# Patient Record
Sex: FEMALE | Race: WHITE | NOT HISPANIC OR LATINO | Employment: FULL TIME
[De-identification: names, ages, dates, MRNs, and addresses within clinical notes are randomized per-mention and may not be internally consistent; named-entity substitution may affect disease eponyms.]

---

## 2020-04-09 ENCOUNTER — RESULTS ONLY (OUTPATIENT)
Dept: LAB | Age: 25
End: 2020-04-09

## 2020-04-09 ENCOUNTER — OFFICE VISIT (OUTPATIENT)
Dept: URGENT CARE | Facility: URGENT CARE | Age: 25
End: 2020-04-09

## 2020-04-09 DIAGNOSIS — Z20.822 SUSPECTED COVID-19 VIRUS INFECTION: Primary | ICD-10-CM

## 2020-04-09 PROCEDURE — 99207 ZZC NO BILLABLE SERVICE THIS VISIT: CPT

## 2020-04-09 NOTE — PATIENT INSTRUCTIONS
[facility] Employee Health team will receive your Covid 19 test results in the next 1-4 days.  Once your results are received, Employee Health will call you with your test result and discuss your Return to Work timeline and criteria.

## 2020-04-10 LAB
SARS-COV-2 PCR COMMENT: NORMAL
SARS-COV-2 RNA SPEC QL NAA+PROBE: NEGATIVE
SARS-COV-2 RNA SPEC QL NAA+PROBE: NORMAL
SPECIMEN SOURCE: NORMAL
SPECIMEN SOURCE: NORMAL

## 2021-01-04 ENCOUNTER — HEALTH MAINTENANCE LETTER (OUTPATIENT)
Age: 26
End: 2021-01-04

## 2021-10-10 ENCOUNTER — HEALTH MAINTENANCE LETTER (OUTPATIENT)
Age: 26
End: 2021-10-10

## 2022-01-30 ENCOUNTER — HEALTH MAINTENANCE LETTER (OUTPATIENT)
Age: 27
End: 2022-01-30

## 2022-04-01 ENCOUNTER — TRANSFERRED RECORDS (OUTPATIENT)
Dept: HEALTH INFORMATION MANAGEMENT | Facility: CLINIC | Age: 27
End: 2022-04-01

## 2022-04-01 LAB — PAP SMEAR - HIM PATIENT REPORTED: NEGATIVE

## 2022-05-29 ENCOUNTER — HEALTH MAINTENANCE LETTER (OUTPATIENT)
Age: 27
End: 2022-05-29

## 2022-06-21 ENCOUNTER — OFFICE VISIT (OUTPATIENT)
Dept: FAMILY MEDICINE | Facility: CLINIC | Age: 27
End: 2022-06-21
Payer: COMMERCIAL

## 2022-06-21 VITALS
WEIGHT: 166.6 LBS | OXYGEN SATURATION: 99 % | DIASTOLIC BLOOD PRESSURE: 79 MMHG | SYSTOLIC BLOOD PRESSURE: 128 MMHG | HEART RATE: 72 BPM | BODY MASS INDEX: 28.44 KG/M2 | HEIGHT: 64 IN | TEMPERATURE: 97.6 F

## 2022-06-21 DIAGNOSIS — Z78.9 VEGETARIAN DIET: ICD-10-CM

## 2022-06-21 DIAGNOSIS — Z76.89 ENCOUNTER TO ESTABLISH CARE: ICD-10-CM

## 2022-06-21 DIAGNOSIS — Z97.5 NEXPLANON IN PLACE: Primary | ICD-10-CM

## 2022-06-21 DIAGNOSIS — K59.01 SLOW TRANSIT CONSTIPATION: ICD-10-CM

## 2022-06-21 PROCEDURE — 99204 OFFICE O/P NEW MOD 45 MIN: CPT | Performed by: PHYSICIAN ASSISTANT

## 2022-06-21 ASSESSMENT — PAIN SCALES - GENERAL: PAINLEVEL: NO PAIN (0)

## 2022-06-21 NOTE — PROGRESS NOTES
"  Assessment & Plan   Problem List Items Addressed This Visit    None     Visit Diagnoses     Nexplanon in place    -  Primary    Relevant Orders    IR Referral    Encounter to establish care        Slow transit constipation        Vegetarian diet               1. Nexplanon is due to be removed this summer.  Implant has migrated proximally.  It is not longer palpable.  Referral placed to interventional radiology for imaging-guided removal of the Nexplanon.    2. We discussed constipation and IBS - I advise she use Miralax daily, and adjust the dose as needed for constipation.  Continue eating plenty of fruits and veg, drinking lots of water, and try to increase exercise.    3. Vegetarian diet.  We discussed possible nutritional deficiencies.  This is less likely for her, since she eats daily and eggs. If she develops any numbness or tingling, or other neurologic symptoms - we can check MMA.     Patient Instructions   Ask your previous clinic for your immunization record.  Then we can our system updated.    Please check in with cardiology/EP regarding any necessary follow up on WPW findings    Journal of Hospital Medicine  Choosing Wisely : Things we do for no Reason  Things We Do for No Reason: Prescribing Docusate for Constipation in Hospitalized Adults  Lamin Perla MD,Isaiah Villa MD  First published: 01 February 2019 [email or web address]                 BMI:   Estimated body mass index is 28.6 kg/m  as calculated from the following:    Height as of this encounter: 1.626 m (5' 4\").    Weight as of this encounter: 75.6 kg (166 lb 9.6 oz).   Weight management plan: Discussed healthy diet and exercise guidelines        Return in about 1 year (around 6/21/2023).    Callie Fontenot PA-C  [facility] is a 26 year old, presenting for the following health issues:  Establish Care      History of Present Illness       Reason for visit:  " "Establish primary care    She eats 4 or more servings of fruits and vegetables daily.She consumes 0 sweetened beverage(s) daily.She exercises with enough effort to increase her heart rate 20 to 29 minutes per day.  She exercises with enough effort to increase her heart rate 4 days per week.   She is taking medications regularly.             Review of Systems         Objective    /79   Pulse 72   Temp 97.6  F (36.4  C) (Tympanic)   Ht 1.626 m (5' 4\")   Wt 75.6 kg (166 lb 9.6 oz)   SpO2 99%   BMI 28.60 kg/m    Body mass index is 28.6 kg/m .  Physical Exam  Constitutional:       General: She is not in acute distress.     Appearance: She is well-developed. She is not diaphoretic.   HENT:      Head: Normocephalic.      Right Ear: External ear normal.      Left Ear: External ear normal.      Nose: Nose normal.   Eyes:      Conjunctiva/sclera: Conjunctivae normal.   Cardiovascular:      Rate and Rhythm: Normal rate and regular rhythm.      Heart sounds: Normal heart sounds.   Pulmonary:      Effort: Pulmonary effort is normal.      Breath sounds: Normal breath sounds.   Chest:   Breasts:      Right: Normal. No mass, nipple discharge or skin change.      Left: Skin change (scar from previous breat biopsy) present. No mass or nipple discharge.       Musculoskeletal:      Cervical back: Normal range of motion.   Skin:     General: Skin is warm and dry.   Neurological:      Mental Status: She is alert and oriented to person, place, and time.   Psychiatric:         Judgment: Judgment normal.                            .  ..  "

## 2022-06-21 NOTE — PATIENT INSTRUCTIONS
Ask your previous clinic for your immunization record.  Then we can our system updated.    Please check in with cardiology/EP regarding any necessary follow up on WPW findings    Journal of Hospital Medicine  Choosing Wisely : Things we do for no Reason  Things We Do for No Reason: Prescribing Docusate for Constipation in Hospitalized Adults  Lamin Perla MD,Isaiah Villa MD  First published: 01 February 2019 [email or web address]

## 2022-06-21 NOTE — Clinical Note
Please abstract the following data from this visit with this patient into the appropriate field in Epic:  Tests that can be patient reported without a hard copy:  Pap smear done on this date: 4/2022 (approximately), by this group: [facility], results were NIL.   Other Tests found in the patient's chart through Chart Review/Care Everywhere:  {Abstract Quality List (Optional):676784}  Note to Abstraction: If this section is blank, no results were found via Chart Review/Care Everywhere.

## 2022-06-27 ENCOUNTER — TELEPHONE (OUTPATIENT)
Dept: FAMILY MEDICINE | Facility: CLINIC | Age: 27
End: 2022-06-27

## 2022-06-27 ENCOUNTER — TELEPHONE (OUTPATIENT)
Dept: OBGYN | Facility: CLINIC | Age: 27
End: 2022-06-27

## 2022-06-27 ENCOUNTER — TELEPHONE (OUTPATIENT)
Dept: INTERVENTIONAL RADIOLOGY/VASCULAR | Facility: CLINIC | Age: 27
End: 2022-06-27

## 2022-06-27 ENCOUNTER — MYC MEDICAL ADVICE (OUTPATIENT)
Dept: FAMILY MEDICINE | Facility: CLINIC | Age: 27
End: 2022-06-27

## 2022-06-27 DIAGNOSIS — Z97.5 NEXPLANON IN PLACE: Primary | ICD-10-CM

## 2022-06-27 DIAGNOSIS — Z30.46 ENCOUNTER FOR SURVEILLANCE OF NEXPLANON SUBDERMAL CONTRACEPTIVE: Primary | ICD-10-CM

## 2022-06-27 NOTE — TELEPHONE ENCOUNTER
I called and spoke with a triage nurse in the OB/GYN clinic at the .  They will contact the patient to schedule an ultrasound and Nexplanon removal in August.     Jonnie Fontenot PA-C

## 2022-06-27 NOTE — TELEPHONE ENCOUNTER
Called and spoke with Pt.  Explained that this would require a surgical consult and not something that can be completed by IR.  Discussed a message would be sent to referring provider, but recommended Pt reach out as well.  Pt verbalized understanding, agreed to current plan and denied any further questions.    Ольга Art LPN

## 2022-06-27 NOTE — TELEPHONE ENCOUNTER
Patient calling clinic to follow up on IR referral. Per IR- unable to remove nexplanon, pt needs surgery referral. Referral pended for provider review.     Pt would like call back once referral is placed with phone #.     Call back: [phone].

## 2022-06-27 NOTE — TELEPHONE ENCOUNTER
Referring providers name, location, phone number and fax:    Callie Fontenot PA-C in  FP/IM/PEDS  [address] [place] [address]  P: [phone]   F:[phone]     Reason for visit: Left arm nexplanon removal - nexplanon has migrated too deep to remove in clinic    Does patient have a referral:Yes    Referral to specific provider? No, referral states the [facility] however [facility] told Pt they don't do this there and directed them to call [facility].     Medical records or notes if possible: Epic

## 2022-06-27 NOTE — TELEPHONE ENCOUNTER
SUKHDEV Cristina calling to refer this patient for nexplanon removal.  Jonnie states that the device has migrated, and she is unable to palpate.    Order placed for upper extremity ultrasound.      Attempted to call patient to schedule.  Left voice mail to call back    She is currently scheduled for 8/4/22 at 230 [place] [facility] [place] here with Dr Elizabeth.    Need to verify which arm for US order.

## 2022-06-28 ENCOUNTER — TELEPHONE (OUTPATIENT)
Dept: OBGYN | Facility: CLINIC | Age: 27
End: 2022-06-28

## 2022-06-28 NOTE — TELEPHONE ENCOUNTER
----- Message from Adrianna Blanchard RN sent at 6/28/2022 10:55 AM CDT -----  Regarding: referral  Pt was given a referral and would like to schedule an appointment

## 2022-08-04 ENCOUNTER — ANCILLARY PROCEDURE (OUTPATIENT)
Dept: ULTRASOUND IMAGING | Facility: CLINIC | Age: 27
End: 2022-08-04
Attending: OBSTETRICS & GYNECOLOGY
Payer: COMMERCIAL

## 2022-08-04 DIAGNOSIS — Z30.46 ENCOUNTER FOR SURVEILLANCE OF NEXPLANON SUBDERMAL CONTRACEPTIVE: ICD-10-CM

## 2022-08-04 PROCEDURE — 76882 US LMTD JT/FCL EVL NVASC XTR: CPT | Mod: GC | Performed by: RADIOLOGY

## 2022-08-15 ENCOUNTER — TELEPHONE (OUTPATIENT)
Dept: OBGYN | Facility: CLINIC | Age: 27
End: 2022-08-15

## 2022-08-15 DIAGNOSIS — Z30.46 ENCOUNTER FOR SURVEILLANCE OF NEXPLANON SUBDERMAL CONTRACEPTIVE: Primary | ICD-10-CM

## 2022-08-15 ASSESSMENT — ANXIETY QUESTIONNAIRES
2. NOT BEING ABLE TO STOP OR CONTROL WORRYING: NOT AT ALL
7. FEELING AFRAID AS IF SOMETHING AWFUL MIGHT HAPPEN: NOT AT ALL
GAD7 TOTAL SCORE: 0
6. BECOMING EASILY ANNOYED OR IRRITABLE: NOT AT ALL
GAD7 TOTAL SCORE: 0
5. BEING SO RESTLESS THAT IT IS HARD TO SIT STILL: NOT AT ALL
4. TROUBLE RELAXING: NOT AT ALL
GAD7 TOTAL SCORE: 0
1. FEELING NERVOUS, ANXIOUS, OR ON EDGE: NOT AT ALL
3. WORRYING TOO MUCH ABOUT DIFFERENT THINGS: NOT AT ALL
7. FEELING AFRAID AS IF SOMETHING AWFUL MIGHT HAPPEN: NOT AT ALL

## 2022-08-15 ASSESSMENT — ENCOUNTER SYMPTOMS
MUSCLE CRAMPS: 0
BACK PAIN: 1
MYALGIAS: 0
DECREASED LIBIDO: 0
HOT FLASHES: 0
NAUSEA: 1
ABDOMINAL PAIN: 0
VOMITING: 0
RECTAL PAIN: 0
HEARTBURN: 0
BLOATING: 1
BLOOD IN STOOL: 0
NECK PAIN: 1
ARTHRALGIAS: 0
JAUNDICE: 0
STIFFNESS: 0
BOWEL INCONTINENCE: 0
CONSTIPATION: 1
JOINT SWELLING: 0
MUSCLE WEAKNESS: 0
DIARRHEA: 0

## 2022-08-15 NOTE — TELEPHONE ENCOUNTER
----- Message from Malinda Elizabeth MD sent at 8/11/2022  1:58 PM CDT -----  Regarding: RE: Nexplanon US  Sorry, yes, I'll give it a try if they can el her before.   ----- Message -----  From: Calista Aguilar RN  Sent: 8/11/2022  12:49 PM CDT  To: Malinda Elizabeth MD  Subject: RE: Nexplanon US                                 Have you discussed this with Dr Zuleta?  Should I reschedule her, have her go for another US on 8/18, or do you think you can remove it with the available information?  Angle,  Calista  ----- Message -----  From: Malinda Elizabeth MD  Sent: 8/9/2022   8:42 AM CDT  To: [facility]  Subject: Nexplanon                                      I'm not sure what happened with scheduling this patient. She was supposed to have US of arm followed by same day appt for removal. She had US on 8/4 but her appt with me is 8/18?? At US they are supposed to el her arm so we know where to remove it from...    Also, I know [facility] is limited, but she is the expert in these cases and should be preferentially placed in CFP clinic.       Thanks!

## 2022-08-15 NOTE — TELEPHONE ENCOUNTER
Scheduled US appointment for 230 on 6/18/22.      Tried to reach Mary to notify her, but received voicemail.  Left message to call back. (Remind Mary to have radiology el site)

## 2022-08-18 ENCOUNTER — OFFICE VISIT (OUTPATIENT)
Dept: OBGYN | Facility: CLINIC | Age: 27
End: 2022-08-18
Attending: OBSTETRICS & GYNECOLOGY
Payer: COMMERCIAL

## 2022-08-18 VITALS
HEART RATE: 87 BPM | HEIGHT: 64 IN | SYSTOLIC BLOOD PRESSURE: 138 MMHG | DIASTOLIC BLOOD PRESSURE: 78 MMHG | WEIGHT: 164.1 LBS | BODY MASS INDEX: 28.01 KG/M2

## 2022-08-18 DIAGNOSIS — Z30.46 NEXPLANON REMOVAL: Primary | ICD-10-CM

## 2022-08-18 PROCEDURE — G0463 HOSPITAL OUTPT CLINIC VISIT: HCPCS

## 2022-08-18 PROCEDURE — 11982 REMOVE DRUG IMPLANT DEVICE: CPT | Performed by: OBSTETRICS & GYNECOLOGY

## 2022-08-18 NOTE — LETTER
Date:August 22, 2022      Provider requested that no letter be sent. Do not send.       [facility]

## 2022-08-18 NOTE — LETTER
2022       RE: Mary Callaway  [address] [address]     Dear Colleague,    Thank you for referring your patient, Mary Callaway, to the [facility]. Please see a copy of my visit note below.      Procedure note:    Patient presents for removal of Nexplanon. Had migration of device with recent US of arm to confirm placement. Arm is marked from Radiology.  Discussed possible inability to remove device in clinic and need for further procedures. She has had been counseled on side effects, risks, benefits and alternatives.  Patient desires to proceed.    Verification of Procedure:  Just before the procedure begans through verbal and active participation of team members, I verified:     Initials   Patient Name [facility]   Patient  [facility]   Procedure to be performed [facility]     Consent:  Risks, benefits of treatment, and alternative options for contraception were discussed.  Patient's questions were elicited and answered.  Written consent was obtained and scanned into medical record.     Patient was resting comfortably on exam table, left arm placed at shoulder level in a 90 degree angle.  Proximal tip of nexplanon palpable, distal tip palpable deep. Skin was  cleansed with betadine solution.  Removal site and track infiltrated with 3cc 1% Lidocaine.      Stab incision made a distal end of marking and marlen forceps used to locate device. Device did palpate deep to fascia and therefor was not removed.     Small amount of bleeding noted at site and no bruising noted.  Bandage and pressure dressing applied to site.       Patient tolerated procedure well.      Plan for removal with orthopedic surgery and CFP team. Note sent to CFP team for coordination.     Malinda Elizabeth MD    Answers for HPI/ROS submitted by the patient on 8/15/2022  OCTAVIA 7 TOTAL SCORE: 0  General Symptoms: No  Skin Symptoms: No  HENT Symptoms: No  EYE  SYMPTOMS: No  HEART SYMPTOMS: No  LUNG SYMPTOMS: No  INTESTINAL SYMPTOMS: Yes  URINARY SYMPTOMS: No  GYNECOLOGIC SYMPTOMS: Yes  BREAST SYMPTOMS: No  SKELETAL SYMPTOMS: Yes  BLOOD SYMPTOMS: No  NERVOUS SYSTEM SYMPTOMS: No  MENTAL HEALTH SYMPTOMS: No  Heart burn or indigestion: No  Nausea: Yes  Vomiting: No  Abdominal pain: No  Bloating: Yes  Constipation: Yes  Diarrhea: No  Blood in stool: No  Black stools: No  Rectal or Anal pain: No  Fecal incontinence: No  Yellowing of skin or eyes: No  Vomit with blood: No  Change in stools: No  Bleeding or spotting between periods: Yes  Heavy or painful periods: No  Irregular periods: Yes  Vaginal discharge: No  Hot flashes: No  Vaginal dryness: No  Genital ulcers: No  Reduced libido: No  Painful intercourse: No  Difficulty with sexual arousal: No  Post-menopausal bleeding: No  Back pain: Yes  Muscle aches: No  Neck pain: Yes  Swollen joints: No  Joint pain: No  Bone pain: No  Muscle cramps: No  Muscle weakness: No  Joint stiffness: No  Bone fracture: No          Again, thank you for allowing me to participate in the care of your patient.      Sincerely,    Malinda Elizabeth MD

## 2022-08-22 NOTE — PROGRESS NOTES
Procedure note:    Patient presents for removal of Nexplanon. Had migration of device with recent US of arm to confirm placement. Arm is marked from Radiology.  Discussed possible inability to remove device in clinic and need for further procedures. She has had been counseled on side effects, risks, benefits and alternatives.  Patient desires to proceed.    Verification of Procedure:  Just before the procedure begans through verbal and active participation of team members, I verified:     Initials   Patient Name [facility]   Patient  [facility]   Procedure to be performed [facility]     Consent:  Risks, benefits of treatment, and alternative options for contraception were discussed.  Patient's questions were elicited and answered.  Written consent was obtained and scanned into medical record.     Patient was resting comfortably on exam table, left arm placed at shoulder level in a 90 degree angle.  Proximal tip of nexplanon palpable, distal tip palpable deep. Skin was  cleansed with betadine solution.  Removal site and track infiltrated with 3cc 1% Lidocaine.      Stab incision made a distal end of marking and marlen forceps used to locate device. Device did palpate deep to fascia and therefor was not removed.     Small amount of bleeding noted at site and no bruising noted.  Bandage and pressure dressing applied to site.       Patient tolerated procedure well.      Plan for removal with orthopedic surgery and CFP team. Note sent to CFP team for coordination.     Malinda Elizabeth MD    Answers for HPI/ROS submitted by the patient on 8/15/2022  OCTAVIA 7 TOTAL SCORE: 0  General Symptoms: No  Skin Symptoms: No  HENT Symptoms: No  EYE SYMPTOMS: No  HEART SYMPTOMS: No  LUNG SYMPTOMS: No  INTESTINAL SYMPTOMS: Yes  URINARY SYMPTOMS: No  GYNECOLOGIC SYMPTOMS: Yes  BREAST SYMPTOMS: No  SKELETAL SYMPTOMS: Yes  BLOOD SYMPTOMS: No  NERVOUS SYSTEM SYMPTOMS: No  MENTAL HEALTH SYMPTOMS: No  Heart burn or indigestion: No  Nausea: Yes  Vomiting:  No  Abdominal pain: No  Bloating: Yes  Constipation: Yes  Diarrhea: No  Blood in stool: No  Black stools: No  Rectal or Anal pain: No  Fecal incontinence: No  Yellowing of skin or eyes: No  Vomit with blood: No  Change in stools: No  Bleeding or spotting between periods: Yes  Heavy or painful periods: No  Irregular periods: Yes  Vaginal discharge: No  Hot flashes: No  Vaginal dryness: No  Genital ulcers: No  Reduced libido: No  Painful intercourse: No  Difficulty with sexual arousal: No  Post-menopausal bleeding: No  Back pain: Yes  Muscle aches: No  Neck pain: Yes  Swollen joints: No  Joint pain: No  Bone pain: No  Muscle cramps: No  Muscle weakness: No  Joint stiffness: No  Bone fracture: No

## 2022-08-25 NOTE — TELEPHONE ENCOUNTER
DIAGNOSIS: nexplanon out of her left arm   APPOINTMENT DATE: 09/08/2022   NOTES STATUS DETAILS   OFFICE NOTE from referring provider Internal 08/18/2022 Dr Elizabeth [facility]    OFFICE NOTE from other specialist N/A 06/21/2022 Dr Fontenot [facility]    DISCHARGE SUMMARY from hospital N/A    DISCHARGE REPORT from the ER N/A    OPERATIVE REPORT N/A    MEDICATION LIST N/A    EMG (for Spine) N/A    IMPLANT RECORD/STICKER N/A    LABS     CBC/DIFF N/A    CULTURES N/A    ULTRASOUND Internal 08/04/2022   MRI N/A    CT SCAN N/A    XRAYS (IMAGES & REPORTS) N/A    TUMOR     PATHOLOGY  Slides & report N/A

## 2022-09-08 ENCOUNTER — PRE VISIT (OUTPATIENT)
Dept: ORTHOPEDICS | Facility: CLINIC | Age: 27
End: 2022-09-08

## 2022-09-08 ENCOUNTER — OFFICE VISIT (OUTPATIENT)
Dept: ORTHOPEDICS | Facility: CLINIC | Age: 27
End: 2022-09-08
Payer: COMMERCIAL

## 2022-09-08 ENCOUNTER — ANCILLARY PROCEDURE (OUTPATIENT)
Dept: GENERAL RADIOLOGY | Facility: CLINIC | Age: 27
End: 2022-09-08
Attending: ORTHOPAEDIC SURGERY
Payer: COMMERCIAL

## 2022-09-08 DIAGNOSIS — Z30.46 NEXPLANON REMOVAL: Primary | ICD-10-CM

## 2022-09-08 DIAGNOSIS — Z30.46 NEXPLANON REMOVAL: ICD-10-CM

## 2022-09-08 PROCEDURE — 99204 OFFICE O/P NEW MOD 45 MIN: CPT | Performed by: ORTHOPAEDIC SURGERY

## 2022-09-08 PROCEDURE — 73060 X-RAY EXAM OF HUMERUS: CPT | Mod: LT | Performed by: RADIOLOGY

## 2022-09-08 NOTE — PROGRESS NOTES
CHIEF CONCERN: Nexplanon removal    HISTORY OF PRESENT ILLNESS: Mary is a 26-year-old right-hand-dominant woman I am seeing today referred by Dr. Malinda Elizabeth from OB/GYN.  Patient is referred for removal of her Nexplanon device in her left arm.  It has been in for 3 years and she describes that she believed it moved after implantation.  She and her significant other would like to perhaps start a family and so she return to OB for removal of this device.  They were unable to remove it in the clinic and so she is referred for surgical removal of the device.  She has had Nexplanon implants placed and removed in the past without difficulty.    Past Medical History:   Diagnosis Date     Breast disorder 2019    Breast lump removal in 2019(left breast)     Varicella 1995    As an infant/child       Past Surgical History:   Procedure Laterality Date     BREAST SURGERY  2019    Lump removal     HERNIA REPAIR  1998       Current Outpatient Medications   Medication Sig Dispense Refill     Multiple Vitamin (MULTIVITAMIN ADULT PO) multivitamin            Allergies   Allergen Reactions     Lactose Other (See Comments)       SOCIAL HISTORY:    Social History     Socioeconomic History     Marital status:      Spouse name: Not on file     Number of children: Not on file     Years of education: Not on file     Highest education level: Not on file   Occupational History     Not on file   Tobacco Use     Smoking status: Never Smoker     Smokeless tobacco: Never Used   Vaping Use     Vaping Use: Not on file   Substance and Sexual Activity     Alcohol use: Yes     Comment: Socially     Drug use: Never     Sexual activity: Yes     Partners: Male     Birth control/protection: Implant, None     Comment: Nexplanon in LUE   Other Topics Concern     Parent/sibling w/ CABG, MI or angioplasty before 65F 55M? No   Social History Narrative    ** Merged History Encounter **          Social Determinants of Health     Financial Resource  Chart reviewed and discussed with Charge RN. Pt remains in R/O COVID-19 isolation. First COVID-19 test resulted in a negative, Second COVID-19 test remains pending. Pt is on 4L of oxygen, pt DID NOT have home O2 prior to admission. Spoke to pt's son Kandy Ding again at length, and updated him on current plan or care. Kandy Ding understands the second COVID-19 test remains pended. Kandy Ding requesting that when results are back that he be notified. All questions answered. Henry HENRIQUEZ remains following- acceptance to [facility] is dependent on second COVID-19 result.      Case management and social work will continue to follow to assist with the transition of care Strain: Not on file   Food Insecurity: Not on file   Transportation Needs: Not on file   Physical Activity: Not on file   Stress: Not on file   Social Connections: Not on file   Intimate Partner Violence: Not on file   Housing Stability: Not on file       FAMILY HISTORY: Reviewed in EMR      REVIEW OF SYSTEMS: Positive for that noted in past medical history and history of present illness and otherwise reviewed in EMR     PHYSICAL EXAM:    Adult female in no acute distress. Articulates and communicates with normal affect.  Respirations even and unlabored  Focused upper extremity exam: Skin is intact over the left upper extremity.  She has full hand wrist elbow and shoulder range of motion without deficits.  Motor is intact to EPL, FPL and intrinsics.  She has normal forward elevation and external Tatian strength of the shoulder.  On inspection of her inner upper arm she has small scars from prior Nexplanon placements.  In the vicinity of the area localized by her x-ray the Nexplanon implant may in fact be palpable.    IMAGING:  Left humerus radiographs demonstrate the Nexplanon implant in place at the level of the mid upper arm.  The ultrasound done previously was reviewed    ASSESSMENT:    1.  Retained Nexplanon implant left arm with unsuccessful OB/GYN clinic removal attempt    PLAN:  I discussed with the patient the option for removal of the implant in the surgical suite.  We discussed localizing the implant with ultrasound.  The ultrasound appears to identify the implant is being immediately subfascial.  We discussed the risk of bleeding, infection, injury to nerves or arteries which power the arm or hand.  We discussed expected postoperative recovery.  She would like to have the implant removed as soon as possible and we will work with her on surgical scheduling.    Paz Holloway MD

## 2022-09-08 NOTE — NURSING NOTE
Reason For Visit:   Chief Complaint   Patient presents with     Consult For     Nexplanon removal of Left arm       PCP: No Ref-Primary, Physician  Ref: Dr. Malinda Elizabeth    ?  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  Work status?  Full time.  Date of injury: No injury  Type of injury: no injury.  Date of surgery: No previous surgeries  Smoker: No  Request smoking cessation information: No    Right hand dominant    There were no vitals taken for this visit.    Caryn Dowell, ATC

## 2022-09-08 NOTE — LETTER
9/8/2022         RE: Mary Callaway  [address] [facility] [address]        Dear Colleague,    Thank you for referring your patient, Mary Callaway, to the [facility]. Please see a copy of my visit note below.    CHIEF CONCERN: Nexplanon removal    HISTORY OF PRESENT ILLNESS: Mary is a 26-year-old right-hand-dominant woman I am seeing today referred by Dr. Malinda Elizabeth from OB/GYN.  Patient is referred for removal of her Nexplanon device in her left arm.  It has been in for 3 years and she describes that she believed it moved after implantation.  She and her significant other would like to perhaps start a family and so she return to OB for removal of this device.  They were unable to remove it in the clinic and so she is referred for surgical removal of the device.  She has had Nexplanon implants placed and removed in the past without difficulty.    Past Medical History:   Diagnosis Date     Breast disorder 2019    Breast lump removal in 2019(left breast)     Varicella 1995    As an infant/child       Past Surgical History:   Procedure Laterality Date     BREAST SURGERY  2019    Lump removal     HERNIA REPAIR  1998       Current Outpatient Medications   Medication Sig Dispense Refill     Multiple Vitamin (MULTIVITAMIN ADULT PO) multivitamin            Allergies   Allergen Reactions     Lactose Other (See Comments)       SOCIAL HISTORY:    Social History     Socioeconomic History     Marital status:      Spouse name: Not on file     Number of children: Not on file     Years of education: Not on file     Highest education level: Not on file   Occupational History     Not on file   Tobacco Use     Smoking status: Never Smoker     Smokeless tobacco: Never Used   Vaping Use     Vaping Use: Not on file   Substance and Sexual Activity     Alcohol use: Yes     Comment: Socially     Drug use: Never     Sexual activity: Yes     Partners: Male     Birth control/protection: Implant,  None     Comment: Nexplanon in LUE   Other Topics Concern     Parent/sibling w/ CABG, MI or angioplasty before 65F 55M? No   Social History Narrative    ** Merged History Encounter **          Social Determinants of Health     Financial Resource Strain: Not on file   Food Insecurity: Not on file   Transportation Needs: Not on file   Physical Activity: Not on file   Stress: Not on file   Social Connections: Not on file   Intimate Partner Violence: Not on file   Housing Stability: Not on file       FAMILY HISTORY: Reviewed in EMR      REVIEW OF SYSTEMS: Positive for that noted in past medical history and history of present illness and otherwise reviewed in EMR     PHYSICAL EXAM:    Adult female in no acute distress. Articulates and communicates with normal affect.  Respirations even and unlabored  Focused upper extremity exam: Skin is intact over the left upper extremity.  She has full hand wrist elbow and shoulder range of motion without deficits.  Motor is intact to EPL, FPL and intrinsics.  She has normal forward elevation and external Tatian strength of the shoulder.  On inspection of her inner upper arm she has small scars from prior Nexplanon placements.  In the vicinity of the area localized by her x-ray the Nexplanon implant may in fact be palpable.    IMAGING:  Left humerus radiographs demonstrate the Nexplanon implant in place at the level of the mid upper arm.  The ultrasound done previously was reviewed    ASSESSMENT:    1.  Retained Nexplanon implant left arm with unsuccessful OB/GYN clinic removal attempt    PLAN:  I discussed with the patient the option for removal of the implant in the surgical suite.  We discussed localizing the implant with ultrasound.  The ultrasound appears to identify the implant is being immediately subfascial.  We discussed the risk of bleeding, infection, injury to nerves or arteries which power the arm or hand.  We discussed expected postoperative recovery.  She would like to  have the implant removed as soon as possible and we will work with her on surgical scheduling.    Paz Holloway MD        Again, thank you for allowing me to participate in the care of your patient.        Sincerely,        Paz Holloway MD

## 2022-09-20 ENCOUNTER — OFFICE VISIT (OUTPATIENT)
Dept: FAMILY MEDICINE | Facility: CLINIC | Age: 27
End: 2022-09-20
Payer: COMMERCIAL

## 2022-09-20 VITALS
WEIGHT: 167 LBS | HEIGHT: 64 IN | BODY MASS INDEX: 28.51 KG/M2 | TEMPERATURE: 98.1 F | OXYGEN SATURATION: 100 % | HEART RATE: 70 BPM | DIASTOLIC BLOOD PRESSURE: 80 MMHG | SYSTOLIC BLOOD PRESSURE: 129 MMHG

## 2022-09-20 DIAGNOSIS — Z97.5 NEXPLANON IN PLACE: ICD-10-CM

## 2022-09-20 DIAGNOSIS — Z01.818 PREOP GENERAL PHYSICAL EXAM: Primary | ICD-10-CM

## 2022-09-20 PROCEDURE — 99214 OFFICE O/P EST MOD 30 MIN: CPT | Mod: 25 | Performed by: PHYSICIAN ASSISTANT

## 2022-09-20 PROCEDURE — 90686 IIV4 VACC NO PRSV 0.5 ML IM: CPT | Performed by: PHYSICIAN ASSISTANT

## 2022-09-20 PROCEDURE — 90471 IMMUNIZATION ADMIN: CPT | Performed by: PHYSICIAN ASSISTANT

## 2022-09-20 ASSESSMENT — PAIN SCALES - GENERAL: PAINLEVEL: NO PAIN (0)

## 2022-09-20 NOTE — H&P (VIEW-ONLY)
[address] [address]  Phone: [phone]  Primary Provider: No Ref-Primary, Physician  Pre-op Performing Provider: ISAAC MERIDA      PREOPERATIVE EVALUATION:  Today's date: 9/20/2022    Mary Callaway is a 26 year old female who presents for a preoperative evaluation.    Surgical Information:  Surgery/Procedure:  left removal of superficial implant (Nexplanon) left upper arm  Surgery Location:  [facility]  Surgeon:  Paz Holloway MD  Surgery Date:  10/11/2022  Time of Surgery:  8:00 AM  Where patient plans to recover: At home with family  Fax number for surgical facility: Note does not need to be faxed, will be available electronically in Epic.    Type of Anesthesia Anticipated: Choice    Assessment & Plan     The proposed surgical procedure is considered LOW risk.    Problem List Items Addressed This Visit    None     Visit Diagnoses     Preop general physical exam    -  Primary    Nexplanon in place                       Medication Instructions:  Patient is on no chronic medications    RECOMMENDATION:  APPROVAL GIVEN to proceed with proposed procedure, without further diagnostic evaluation.              12 minutes spent on the date of the encounter doing chart review, history and exam, documentation and further activities per the note        Subjective     HPI related to upcoming procedure: migrated Nexplanon, unable to remove in clinic    Preop Questions 9/20/2022   1. Have you ever had a heart attack or stroke? No   2. Have you ever had surgery on your heart or blood vessels, such as a stent placement, a coronary artery bypass, or surgery on an artery in your head, neck, heart, or legs? No   3. Do you have chest pain with activity? No   4. Do you have a history of  heart failure? No   5. Do you currently have a cold, bronchitis or symptoms of other infection? No   6. Do you have a  cough, shortness of breath, or wheezing? No   7. Do you or anyone in your family have previous history of blood clots? YES - Father has had multiple (PE, spleen), paternal grandmother with multiple clots and CVA   8. Do you or does anyone in your family have a serious bleeding problem such as prolonged bleeding following surgeries or cuts? No   9. Have you ever had problems with anemia or been told to take iron pills? No   10. Have you had any abnormal blood loss such as black, tarry or bloody stools, or abnormal vaginal bleeding? No   11. Have you ever had a blood transfusion? No   12. Are you willing to have a blood transfusion if it is medically needed before, during, or after your surgery? Yes   13. Have you or any of your relatives ever had problems with anesthesia? YES - Mother has a hard time waking up after anesthesia   14. Do you have sleep apnea, excessive snoring or daytime drowsiness? No   15. Do you have any artifical heart valves or other implanted medical devices like a pacemaker, defibrillator, or continuous glucose monitor? No   16. Do you have artificial joints? No   17. Are you allergic to latex? No   18. Is there any chance that you may be pregnant? No       Health Care Directive:  Patient does not have a Health Care Directive or Living Will: Discussed advance care planning with patient; however, patient declined at this time.    Preoperative Review of :   reviewed - no record of controlled substances prescribed.          Review of Systems      Patient Active Problem List    Diagnosis Date Noted     Nexplanon removal 09/08/2022     Priority: Medium     Added automatically from request for surgery [id number]        Past Medical History:   Diagnosis Date     Breast disorder 2019    Breast lump removal in 2019(left breast)     Varicella 1995    As an infant/child     Past Surgical History:   Procedure Laterality Date     BREAST SURGERY  2019    Lump removal     HERNIA REPAIR  1998     Current  "Outpatient Medications   Medication Sig Dispense Refill     Multiple Vitamin (MULTIVITAMIN ADULT PO) multivitamin         Allergies   Allergen Reactions     Lactose Other (See Comments)        Social History     Tobacco Use     Smoking status: Never Smoker     Smokeless tobacco: Never Used   Substance Use Topics     Alcohol use: Yes     Comment: Socially       History   Drug Use Unknown         Objective     /80   Pulse 70   Temp 98.1  F (36.7  C) (Temporal)   Ht 1.626 m (5' 4\")   Wt 75.8 kg (167 lb)   SpO2 100%   BMI 28.67 kg/m      Physical Exam  Constitutional:       General: She is not in acute distress.     Appearance: She is well-developed. She is not diaphoretic.   HENT:      Head: Normocephalic.      Right Ear: External ear normal.      Left Ear: External ear normal.      Nose: Nose normal.   Eyes:      Conjunctiva/sclera: Conjunctivae normal.   Cardiovascular:      Rate and Rhythm: Normal rate and regular rhythm.      Heart sounds: Normal heart sounds.   Pulmonary:      Effort: Pulmonary effort is normal.      Breath sounds: Normal breath sounds.   Musculoskeletal:      Cervical back: Normal range of motion.   Skin:     General: Skin is warm and dry.   Neurological:      Mental Status: She is alert and oriented to person, place, and time.   Psychiatric:         Judgment: Judgment normal.           No results for input(s): HGB, PLT, INR, NA, POTASSIUM, CR, A1C in the last 58104 hours.     Diagnostics:  No labs were ordered during this visit.   No EKG required for low risk surgery (cataract, skin procedure, breast biopsy, etc).    Revised Cardiac Risk Index (RCRI):  The patient has the following serious cardiovascular risks for perioperative complications:   - No serious cardiac risks = 0 points     RCRI Interpretation: 0 points: Class I (very low risk - 0.4% complication rate)           Signed Electronically by: Callie Fontenot PA-C  Copy of this evaluation report is provided to " requesting physician.

## 2022-09-20 NOTE — PROGRESS NOTES
[address] [address]  Phone: [phone]  Primary Provider: No Ref-Primary, Physician  Pre-op Performing Provider: ISAAC MERIDA      PREOPERATIVE EVALUATION:  Today's date: 9/20/2022    Mary Callaway is a 26 year old female who presents for a preoperative evaluation.    Surgical Information:  Surgery/Procedure:  left removal of superficial implant (Nexplanon) left upper arm  Surgery Location:  [facility]  Surgeon:  Paz Holloway MD  Surgery Date:  10/11/2022  Time of Surgery:  8:00 AM  Where patient plans to recover: At home with family  Fax number for surgical facility: Note does not need to be faxed, will be available electronically in Epic.    Type of Anesthesia Anticipated: Choice    Assessment & Plan     The proposed surgical procedure is considered LOW risk.    Problem List Items Addressed This Visit    None     Visit Diagnoses     Preop general physical exam    -  Primary    Nexplanon in place                       Medication Instructions:  Patient is on no chronic medications    RECOMMENDATION:  APPROVAL GIVEN to proceed with proposed procedure, without further diagnostic evaluation.              12 minutes spent on the date of the encounter doing chart review, history and exam, documentation and further activities per the note        Subjective     HPI related to upcoming procedure: migrated Nexplanon, unable to remove in clinic    Preop Questions 9/20/2022   1. Have you ever had a heart attack or stroke? No   2. Have you ever had surgery on your heart or blood vessels, such as a stent placement, a coronary artery bypass, or surgery on an artery in your head, neck, heart, or legs? No   3. Do you have chest pain with activity? No   4. Do you have a history of  heart failure? No   5. Do you currently have a cold, bronchitis or symptoms of other infection? No   6. Do you have a  cough, shortness of breath, or wheezing? No   7. Do you or anyone in your family have previous history of blood clots? YES - Father has had multiple (PE, spleen), paternal grandmother with multiple clots and CVA   8. Do you or does anyone in your family have a serious bleeding problem such as prolonged bleeding following surgeries or cuts? No   9. Have you ever had problems with anemia or been told to take iron pills? No   10. Have you had any abnormal blood loss such as black, tarry or bloody stools, or abnormal vaginal bleeding? No   11. Have you ever had a blood transfusion? No   12. Are you willing to have a blood transfusion if it is medically needed before, during, or after your surgery? Yes   13. Have you or any of your relatives ever had problems with anesthesia? YES - Mother has a hard time waking up after anesthesia   14. Do you have sleep apnea, excessive snoring or daytime drowsiness? No   15. Do you have any artifical heart valves or other implanted medical devices like a pacemaker, defibrillator, or continuous glucose monitor? No   16. Do you have artificial joints? No   17. Are you allergic to latex? No   18. Is there any chance that you may be pregnant? No       Health Care Directive:  Patient does not have a Health Care Directive or Living Will: Discussed advance care planning with patient; however, patient declined at this time.    Preoperative Review of :   reviewed - no record of controlled substances prescribed.          Review of Systems      Patient Active Problem List    Diagnosis Date Noted     Nexplanon removal 09/08/2022     Priority: Medium     Added automatically from request for surgery [id number]        Past Medical History:   Diagnosis Date     Breast disorder 2019    Breast lump removal in 2019(left breast)     Varicella 1995    As an infant/child     Past Surgical History:   Procedure Laterality Date     BREAST SURGERY  2019    Lump removal     HERNIA REPAIR  1998     Current  "Outpatient Medications   Medication Sig Dispense Refill     Multiple Vitamin (MULTIVITAMIN ADULT PO) multivitamin         Allergies   Allergen Reactions     Lactose Other (See Comments)        Social History     Tobacco Use     Smoking status: Never Smoker     Smokeless tobacco: Never Used   Substance Use Topics     Alcohol use: Yes     Comment: Socially       History   Drug Use Unknown         Objective     /80   Pulse 70   Temp 98.1  F (36.7  C) (Temporal)   Ht 1.626 m (5' 4\")   Wt 75.8 kg (167 lb)   SpO2 100%   BMI 28.67 kg/m      Physical Exam  Constitutional:       General: She is not in acute distress.     Appearance: She is well-developed. She is not diaphoretic.   HENT:      Head: Normocephalic.      Right Ear: External ear normal.      Left Ear: External ear normal.      Nose: Nose normal.   Eyes:      Conjunctiva/sclera: Conjunctivae normal.   Cardiovascular:      Rate and Rhythm: Normal rate and regular rhythm.      Heart sounds: Normal heart sounds.   Pulmonary:      Effort: Pulmonary effort is normal.      Breath sounds: Normal breath sounds.   Musculoskeletal:      Cervical back: Normal range of motion.   Skin:     General: Skin is warm and dry.   Neurological:      Mental Status: She is alert and oriented to person, place, and time.   Psychiatric:         Judgment: Judgment normal.           No results for input(s): HGB, PLT, INR, NA, POTASSIUM, CR, A1C in the last 82220 hours.     Diagnostics:  No labs were ordered during this visit.   No EKG required for low risk surgery (cataract, skin procedure, breast biopsy, etc).    Revised Cardiac Risk Index (RCRI):  The patient has the following serious cardiovascular risks for perioperative complications:   - No serious cardiac risks = 0 points     RCRI Interpretation: 0 points: Class I (very low risk - 0.4% complication rate)           Signed Electronically by: Callie Fontenot PA-C  Copy of this evaluation report is provided to " requesting physician.

## 2022-10-10 ENCOUNTER — ANESTHESIA EVENT (OUTPATIENT)
Dept: SURGERY | Facility: AMBULATORY SURGERY CENTER | Age: 27
End: 2022-10-10
Payer: COMMERCIAL

## 2022-10-11 ENCOUNTER — HOSPITAL ENCOUNTER (OUTPATIENT)
Facility: AMBULATORY SURGERY CENTER | Age: 27
Discharge: HOME | End: 2022-10-11
Attending: ORTHOPAEDIC SURGERY
Payer: COMMERCIAL

## 2022-10-11 ENCOUNTER — ANESTHESIA (OUTPATIENT)
Dept: SURGERY | Facility: AMBULATORY SURGERY CENTER | Age: 27
End: 2022-10-11
Payer: COMMERCIAL

## 2022-10-11 VITALS
BODY MASS INDEX: 28 KG/M2 | RESPIRATION RATE: 16 BRPM | SYSTOLIC BLOOD PRESSURE: 145 MMHG | TEMPERATURE: 97.6 F | DIASTOLIC BLOOD PRESSURE: 75 MMHG | HEIGHT: 64 IN | OXYGEN SATURATION: 99 % | WEIGHT: 164 LBS | HEART RATE: 71 BPM

## 2022-10-11 DIAGNOSIS — Z30.46 NEXPLANON REMOVAL: ICD-10-CM

## 2022-10-11 LAB
HCG UR QL: NEGATIVE
INTERNAL QC OK POCT: NORMAL
POCT KIT EXPIRATION DATE: NORMAL
POCT KIT LOT NUMBER: NORMAL

## 2022-10-11 PROCEDURE — 20680 REMOVAL OF IMPLANT DEEP: CPT | Mod: LT

## 2022-10-11 PROCEDURE — 20680 REMOVAL OF IMPLANT DEEP: CPT | Mod: LT | Performed by: ORTHOPAEDIC SURGERY

## 2022-10-11 PROCEDURE — 81025 URINE PREGNANCY TEST: CPT | Performed by: PATHOLOGY

## 2022-10-11 RX ORDER — FENTANYL CITRATE 50 UG/ML
25 INJECTION, SOLUTION INTRAMUSCULAR; INTRAVENOUS EVERY 5 MIN PRN
Status: DISCONTINUED | OUTPATIENT
Start: 2022-10-11 | End: 2022-10-11 | Stop reason: HOSPADM

## 2022-10-11 RX ORDER — SODIUM CHLORIDE, SODIUM LACTATE, POTASSIUM CHLORIDE, CALCIUM CHLORIDE 600; 310; 30; 20 MG/100ML; MG/100ML; MG/100ML; MG/100ML
INJECTION, SOLUTION INTRAVENOUS CONTINUOUS
Status: DISCONTINUED | OUTPATIENT
Start: 2022-10-11 | End: 2022-10-11 | Stop reason: HOSPADM

## 2022-10-11 RX ORDER — GABAPENTIN 300 MG/1
300 CAPSULE ORAL
Status: COMPLETED | OUTPATIENT
Start: 2022-10-11 | End: 2022-10-11

## 2022-10-11 RX ORDER — HYDROMORPHONE HYDROCHLORIDE 1 MG/ML
0.2 INJECTION, SOLUTION INTRAMUSCULAR; INTRAVENOUS; SUBCUTANEOUS EVERY 5 MIN PRN
Status: DISCONTINUED | OUTPATIENT
Start: 2022-10-11 | End: 2022-10-11 | Stop reason: HOSPADM

## 2022-10-11 RX ORDER — ACETAMINOPHEN 325 MG/1
650 TABLET ORAL EVERY 4 HOURS PRN
Qty: 50 TABLET | Refills: 0 | COMMUNITY
Start: 2022-10-11 | End: 2023-07-17

## 2022-10-11 RX ORDER — CEFAZOLIN SODIUM 2 G/50ML
2 SOLUTION INTRAVENOUS
Status: COMPLETED | OUTPATIENT
Start: 2022-10-11 | End: 2022-10-11

## 2022-10-11 RX ORDER — FENTANYL CITRATE 50 UG/ML
25 INJECTION, SOLUTION INTRAMUSCULAR; INTRAVENOUS
Status: DISCONTINUED | OUTPATIENT
Start: 2022-10-11 | End: 2022-10-12 | Stop reason: HOSPADM

## 2022-10-11 RX ORDER — MEPERIDINE HYDROCHLORIDE 25 MG/ML
12.5 INJECTION INTRAMUSCULAR; INTRAVENOUS; SUBCUTANEOUS
Status: DISCONTINUED | OUTPATIENT
Start: 2022-10-11 | End: 2022-10-12 | Stop reason: HOSPADM

## 2022-10-11 RX ORDER — FENTANYL CITRATE 50 UG/ML
INJECTION, SOLUTION INTRAMUSCULAR; INTRAVENOUS PRN
Status: DISCONTINUED | OUTPATIENT
Start: 2022-10-11 | End: 2022-10-11

## 2022-10-11 RX ORDER — IBUPROFEN 200 MG
600 TABLET ORAL EVERY 6 HOURS PRN
Status: DISCONTINUED | OUTPATIENT
Start: 2022-10-11 | End: 2022-10-12 | Stop reason: HOSPADM

## 2022-10-11 RX ORDER — PROPOFOL 10 MG/ML
INJECTION, EMULSION INTRAVENOUS PRN
Status: DISCONTINUED | OUTPATIENT
Start: 2022-10-11 | End: 2022-10-11

## 2022-10-11 RX ORDER — SODIUM CHLORIDE, SODIUM LACTATE, POTASSIUM CHLORIDE, CALCIUM CHLORIDE 600; 310; 30; 20 MG/100ML; MG/100ML; MG/100ML; MG/100ML
INJECTION, SOLUTION INTRAVENOUS CONTINUOUS
Status: DISCONTINUED | OUTPATIENT
Start: 2022-10-11 | End: 2022-10-12 | Stop reason: HOSPADM

## 2022-10-11 RX ORDER — LIDOCAINE 40 MG/G
CREAM TOPICAL
Status: DISCONTINUED | OUTPATIENT
Start: 2022-10-11 | End: 2022-10-11 | Stop reason: HOSPADM

## 2022-10-11 RX ORDER — ACETAMINOPHEN 325 MG/1
650 TABLET ORAL
Status: DISCONTINUED | OUTPATIENT
Start: 2022-10-11 | End: 2022-10-12 | Stop reason: HOSPADM

## 2022-10-11 RX ORDER — DEXAMETHASONE SODIUM PHOSPHATE 4 MG/ML
INJECTION, SOLUTION INTRA-ARTICULAR; INTRALESIONAL; INTRAMUSCULAR; INTRAVENOUS; SOFT TISSUE PRN
Status: DISCONTINUED | OUTPATIENT
Start: 2022-10-11 | End: 2022-10-11

## 2022-10-11 RX ORDER — LIDOCAINE HYDROCHLORIDE 20 MG/ML
INJECTION, SOLUTION INFILTRATION; PERINEURAL PRN
Status: DISCONTINUED | OUTPATIENT
Start: 2022-10-11 | End: 2022-10-11

## 2022-10-11 RX ORDER — BUPIVACAINE HYDROCHLORIDE 5 MG/ML
INJECTION, SOLUTION PERINEURAL PRN
Status: DISCONTINUED | OUTPATIENT
Start: 2022-10-11 | End: 2022-10-11 | Stop reason: HOSPADM

## 2022-10-11 RX ORDER — ONDANSETRON 2 MG/ML
4 INJECTION INTRAMUSCULAR; INTRAVENOUS EVERY 30 MIN PRN
Status: DISCONTINUED | OUTPATIENT
Start: 2022-10-11 | End: 2022-10-12 | Stop reason: HOSPADM

## 2022-10-11 RX ORDER — PROPOFOL 10 MG/ML
INJECTION, EMULSION INTRAVENOUS CONTINUOUS PRN
Status: DISCONTINUED | OUTPATIENT
Start: 2022-10-11 | End: 2022-10-11

## 2022-10-11 RX ORDER — KETOROLAC TROMETHAMINE 30 MG/ML
INJECTION, SOLUTION INTRAMUSCULAR; INTRAVENOUS PRN
Status: DISCONTINUED | OUTPATIENT
Start: 2022-10-11 | End: 2022-10-11

## 2022-10-11 RX ORDER — CEFAZOLIN SODIUM 2 G/50ML
2 SOLUTION INTRAVENOUS SEE ADMIN INSTRUCTIONS
Status: DISCONTINUED | OUTPATIENT
Start: 2022-10-11 | End: 2022-10-11 | Stop reason: HOSPADM

## 2022-10-11 RX ORDER — LIDOCAINE HYDROCHLORIDE 10 MG/ML
INJECTION, SOLUTION INFILTRATION; PERINEURAL PRN
Status: DISCONTINUED | OUTPATIENT
Start: 2022-10-11 | End: 2022-10-11 | Stop reason: HOSPADM

## 2022-10-11 RX ORDER — ONDANSETRON 2 MG/ML
INJECTION INTRAMUSCULAR; INTRAVENOUS PRN
Status: DISCONTINUED | OUTPATIENT
Start: 2022-10-11 | End: 2022-10-11

## 2022-10-11 RX ORDER — ONDANSETRON 4 MG/1
4 TABLET, ORALLY DISINTEGRATING ORAL EVERY 8 HOURS PRN
Qty: 4 TABLET | Refills: 0 | Status: SHIPPED | OUTPATIENT
Start: 2022-10-11 | End: 2023-07-17

## 2022-10-11 RX ORDER — ACETAMINOPHEN 325 MG/1
975 TABLET ORAL ONCE
Status: COMPLETED | OUTPATIENT
Start: 2022-10-11 | End: 2022-10-11

## 2022-10-11 RX ORDER — ONDANSETRON 4 MG/1
4 TABLET, ORALLY DISINTEGRATING ORAL EVERY 30 MIN PRN
Status: DISCONTINUED | OUTPATIENT
Start: 2022-10-11 | End: 2022-10-12 | Stop reason: HOSPADM

## 2022-10-11 RX ORDER — OXYCODONE HYDROCHLORIDE 5 MG/1
5 TABLET ORAL EVERY 4 HOURS PRN
Status: DISCONTINUED | OUTPATIENT
Start: 2022-10-11 | End: 2022-10-12 | Stop reason: HOSPADM

## 2022-10-11 RX ADMIN — ONDANSETRON 4 MG: 2 INJECTION INTRAMUSCULAR; INTRAVENOUS at 08:05

## 2022-10-11 RX ADMIN — DEXAMETHASONE SODIUM PHOSPHATE 4 MG: 4 INJECTION, SOLUTION INTRA-ARTICULAR; INTRALESIONAL; INTRAMUSCULAR; INTRAVENOUS; SOFT TISSUE at 08:05

## 2022-10-11 RX ADMIN — CEFAZOLIN SODIUM 2 G: 2 SOLUTION INTRAVENOUS at 07:53

## 2022-10-11 RX ADMIN — ONDANSETRON 4 MG: 4 TABLET, ORALLY DISINTEGRATING ORAL at 08:59

## 2022-10-11 RX ADMIN — ACETAMINOPHEN 975 MG: 325 TABLET ORAL at 06:50

## 2022-10-11 RX ADMIN — GABAPENTIN 300 MG: 300 CAPSULE ORAL at 06:50

## 2022-10-11 RX ADMIN — KETOROLAC TROMETHAMINE 30 MG: 30 INJECTION, SOLUTION INTRAMUSCULAR; INTRAVENOUS at 08:15

## 2022-10-11 RX ADMIN — PROPOFOL 40 MG: 10 INJECTION, EMULSION INTRAVENOUS at 08:00

## 2022-10-11 RX ADMIN — LIDOCAINE HYDROCHLORIDE 70 MG: 20 INJECTION, SOLUTION INFILTRATION; PERINEURAL at 08:00

## 2022-10-11 RX ADMIN — FENTANYL CITRATE 50 MCG: 50 INJECTION, SOLUTION INTRAMUSCULAR; INTRAVENOUS at 08:06

## 2022-10-11 RX ADMIN — PROPOFOL 150 MCG/KG/MIN: 10 INJECTION, EMULSION INTRAVENOUS at 08:00

## 2022-10-11 RX ADMIN — SODIUM CHLORIDE, SODIUM LACTATE, POTASSIUM CHLORIDE, CALCIUM CHLORIDE: 600; 310; 30; 20 INJECTION, SOLUTION INTRAVENOUS at 06:50

## 2022-10-11 NOTE — INTERVAL H&P NOTE
"I have reviewed the surgical (or preoperative) H&P that is linked to this encounter, and examined the patient. There are no significant changes    Clinical Conditions Present on Arrival:  Clinically Significant Risk Factors Present on Admission                   # Overweight: Estimated body mass index is 28.15 kg/m  as calculated from the following:    Height as of this encounter: 1.626 m (5' 4\").    Weight as of this encounter: 74.4 kg (164 lb).       "

## 2022-10-11 NOTE — BRIEF OP NOTE
[facility] Issue    Brief Operative Note    Pre-operative diagnosis: Nexplanon removal [Z30.46]  Post-operative diagnosis Same as pre-operative diagnosis    Procedure: Procedure(s):  left removal of superficial implant (Nexplanon) left upper arm  Surgeon: Surgeon(s) and Role:     * Paz Holloway MD - Primary     * Francisco Lozada - Resident - Assisting  Anesthesia: MAC with Local   Estimated Blood Loss: 1 ml    Drains: none  Specimens: * No specimens in log *  Findings:   None.  Complications: None.  Implants:   Implant Name Type Inv. Item Serial No.  Lot No. [id number] No. Used Action   Nexplanon Implant      Left 1 Explanted

## 2022-10-11 NOTE — ANESTHESIA CARE TRANSFER NOTE
Patient: Mary Callaway    Procedure: Procedure(s):  left removal of superficial implant (Nexplanon) left upper arm       Diagnosis: Nexplanon removal [Z30.46]  Diagnosis Additional Information: No value filed.    Anesthesia Type:   General     Note:    Oropharynx: oropharynx clear of all foreign objects  Level of Consciousness: awake  Oxygen Supplementation: room air    Independent Airway: airway patency satisfactory and stable  Dentition: dentition unchanged  Vital Signs Stable: post-procedure vital signs reviewed and stable  Report to RN Given: handoff report given  Patient transferred to: Phase II  Comments: VSS and WNL, comfortable, no PONV, report to RN  Handoff Report: Identifed the Patient, Identified the Reponsible Provider, Reviewed the pertinent medical history, Discussed the surgical course, Reviewed Intra-OP anesthesia mangement and issues during anesthesia, Set expectations for post-procedure period and Allowed opportunity for questions and acknowledgement of understanding      Vitals:  Vitals Value Taken Time   /57 10/11/22 0828   Temp 36.4  C (97.6  F) 10/11/22 0828   Pulse 88 10/11/22 0828   Resp 16 10/11/22 0828   SpO2 99 % 10/11/22 0828       Electronically Signed By: EMMANUEL Davis CRNA  October 11, 2022  8:30 AM

## 2022-10-11 NOTE — DISCHARGE INSTRUCTIONS
"[facility]  Home Care Following Anesthesia  For 24 hours after surgery:  Get plenty of rest.  A responsible adult must stay with you for at least 24 hours after you leave the surgery center.  Do not drive or use heavy equipment.  If you have weakness or tingling, don't drive or use heavy equipment until this feeling goes away.   Do not drink alcohol.   Avoid strenuous or risky activities.  Ask for help when climbing stairs.  You may feel lightheaded.  IF so, sit for a few minutes before standing.  Have someone help you get up.   If you have nausea (feel sick to your stomach): Drink only clear liquids such as apple juice, ginger ale, broth or 7-Up.  Rest may also help.  Be sure to drink enough fluids.  Move to a regular diet as you feel able.   You may have a slight fever.  Call the doctor if your fever is over 100 F (37.7 C) (taken under the tongue) or lasts longer than 24 hours.  You may have a dry mouth, a sore throat, muscle aches or trouble sleeping. These should go away after 24 hours.  Do not make important or legal decisions.   It is recommended to avoid smoking.        Today you received a Marcaine or bupivacaine block to numb the nerves near your surgery site.  This is a block using local anesthetic or \"numbing\" medication injected around the nerves to anesthetize or \"numb\" the area supplied by those nerves.  This block is injected into the muscle layer near your surgical site.  The medication may numb the location where you had surgery for 6-18 hours, but may last up to 24 hours.  If your surgical site is an arm or leg you should be careful with your affected limb, since it is possible to injure your limb without being aware of it due to the numbing.  Until full feeling returns, you should guard against bumping or hitting your limb, and avoid extreme hot or cold temperatures on the skin.  As the block wears off, the feeling will return as a tingling or prickly " sensation near your surgical site.  You will experience more discomfort from your incision as the feeling returns.  You may want to take a pain pill (a narcotic or Tylenol if this was prescribed by your surgeon) when you start to experience mild pain before the pain beccomes more severe.  If your pain medications do not control your pain you should notifiy your surgeon.    Tips for taking pain medications  To get the best pain relief possible, remember these points:  Take pain medications as directed, before pain becomes severe.  Pain medication can upset your stomach: taking it with food may help.  Constipation is a common side effect of pain medication. Drink plenty of  fluids.  Eat foods high in fiber. Take a stool softener if recommended by your doctor or pharmacist.  Do not drink alcohol, drive or operate machinery while taking pain medications.  Ask about other ways to control pain, such as with heat, ice or relaxation.    Tylenol/Acetaminophen Consumption  To help encourage the safe use of acetaminophen, the makers of TYLENOL  have lowered the maximum daily dose for single-ingredient Extra Strength TYLENOL  (acetaminophen) products sold in the [place] from 8 pills per day (4,000 mg) to 6 pills per day (3,000 mg). The dosing interval has also changed from 2 pills every 4-6 hours to 2 pills every 6 hours.  If you feel your pain relief is insufficient, you may take Tylenol/Acetaminophen in addition to your narcotic pain medication.   Be careful not to exceed 3,000 mg of Tylenol/Acetaminophen in a 24 hour period from all sources.  If you are taking extra strength Tylenol/acetaminophen (500 mg), the maximum dose is 6 tablets in 24 hours.  If you are taking regular strength acetaminophen (325 mg), the maximum dose is 9 tablets in 24 hours.    Call a doctor for any of the following:  Signs of infection (fever, growing tenderness at the surgery site, a large amount of drainage or bleeding, severe pain, foul-smelling  drainage, redness, swelling).  It has been over 8 to 10 hours since surgery and you are still not able to urinate (pass water).  Headache for over 24 hours.  Numbness, tingling or weakness the day after surgery (if you had spinal anesthesia).  Signs of Covid-19 infection (temperature over 100 degrees, shortness of breath, cough, loss of taste/smell, generalized body aches, persistent headache, chills, sore throat, nausea/vomiting/diarrhea)  Your doctor is:  Dr. Paz Holloway, Orthopaedics: [phone]                 Or dial [phone] and ask for the resident on call for:  Orthopaedics  For emergency care, call the:  [facility] Emergency Department: [phone] (TTY for hearing impaired: [phone])

## 2022-10-11 NOTE — ANESTHESIA PREPROCEDURE EVALUATION
Anesthesia Pre-Procedure Evaluation    Patient: Mary Callaway   MRN: [id number] : 1995        Procedure : Procedure(s):  left removal of superficial implant (Nexplanon) left upper arm          Past Medical History:   Diagnosis Date     Breast disorder 2019    Breast lump removal in 2019(left breast)     Varicella     As an infant/child      Past Surgical History:   Procedure Laterality Date     BREAST SURGERY  2019    Lump removal     HERNIA REPAIR  1998      Allergies   Allergen Reactions     Lactose Other (See Comments)      Social History     Tobacco Use     Smoking status: Never     Smokeless tobacco: Never   Substance Use Topics     Alcohol use: Yes     Comment: Socially      Wt Readings from Last 1 Encounters:   10/11/22 74.4 kg (164 lb)        Anesthesia Evaluation            ROS/MED HX  ENT/Pulmonary:  - neg pulmonary ROS   (+) sleep apnea,     Neurologic:  - neg neurologic ROS     Cardiovascular:  - neg cardiovascular ROS     METS/Exercise Tolerance:     Hematologic:  - neg hematologic  ROS     Musculoskeletal:  - neg musculoskeletal ROS     GI/Hepatic:  - neg GI/hepatic ROS     Renal/Genitourinary:  - neg Renal ROS     Endo:  - neg endo ROS     Psychiatric/Substance Use:  - neg psychiatric ROS     Infectious Disease:  - neg infectious disease ROS     Malignancy:   (+) Malignancy, History of Breast.Breast CA Remission status post Surgery.        Other:               OUTSIDE LABS:  CBC: No results found for: WBC, HGB, HCT, PLT  BMP: No results found for: NA, POTASSIUM, CHLORIDE, CO2, BUN, CR, GLC  COAGS: No results found for: PTT, INR, FIBR  POC:   Lab Results   Component Value Date    HCG Negative 10/11/2022     HEPATIC: No results found for: ALBUMIN, PROTTOTAL, ALT, AST, GGT, ALKPHOS, BILITOTAL, BILIDIRECT, DASHAWN  OTHER: No results found for: PH, LACT, A1C, INGRID, PHOS, MAG, LIPASE, AMYLASE, TSH, T4, T3, CRP, SED    Anesthesia Plan    ASA Status:  1      Anesthesia Type: MAC.     - Reason for  MAC: immobility needed              Consents    Anesthesia Plan(s) and associated risks, benefits, and realistic alternatives discussed. Questions answered and patient/representative(s) expressed understanding.     - Discussed: Risks, Benefits and Alternatives for BOTH SEDATION and the PROCEDURE were discussed     - Discussed with:  Patient      - Extended Intubation/Ventilatory Support Discussed: No.      - Patient is DNR/DNI Status: No    Use of blood products discussed: No .     Postoperative Care            Comments:                Lamin Trevino MD, MD

## 2022-10-11 NOTE — ANESTHESIA POSTPROCEDURE EVALUATION
Patient: Mary Callaway    Procedure: Procedure(s):  left removal of superficial implant (Nexplanon) left upper arm       Anesthesia Type:  General    Note:  Disposition: Outpatient   Postop Pain Control: Uneventful            Sign Out: Well controlled pain   PONV: No   Neuro/Psych: Uneventful            Sign Out: Acceptable/Baseline neuro status   Airway/Respiratory: Uneventful            Sign Out: Acceptable/Baseline resp. status   CV/Hemodynamics: Uneventful            Sign Out: Acceptable CV status; No obvious hypovolemia; No obvious fluid overload   Other NRE: NONE   DID A NON-ROUTINE EVENT OCCUR? No           Last vitals:  Vitals Value Taken Time   /75 10/11/22 0843   Temp 36.4  C (97.6  F) 10/11/22 0843   Pulse 71 10/11/22 0843   Resp 16 10/11/22 0843   SpO2 99 % 10/11/22 0843       Electronically Signed By: Lamin Trevino MD, MD  October 11, 2022  11:12 AM

## 2022-10-12 NOTE — OP NOTE
DATE OF PROCEDURE: 10/11/22    PREOPERATIVE DIAGNOSES:   1. History of Nexplanon implant placement for contraception  2. Failed attempt at Nexplanon removal in office    POSTOPERATIVE DIAGNOSES:   1. Same     PROCEDURES:   1. Removal of deep implant left arm    STAFF SURGEON: Paz Holloway MD     ANESTHESIA: Monitored anesthesia care/sedation  ESTIMATED BLOOD LOSS: 5 mL.      IMPLANTS REMOVED: Nexplanon implant, 4cm in length  COMPLICATIONS: None.      BRIEF PATIENT HISTORY: Mrs. Callaway is a 26 year old woman who was referred by OB/GYN for removal of her Nexplanon implant in the surgical suite as it was too deep for in office removal. We discussed the risks and potential benefits of this procedure. Informed consent was completed.    DESCRIPTION OF PROCEDURE: The patient was identified in the preoperative area and the correct left arm was marked for surgery. The consent was completed with the patient. The patient was brought to the operating room where she was surrendered to sedation by anesthesia.  A timeout was held in accordance with hospital policy, confirming correct patient, side, site, procedure and administration of IV antibiotics prior to incision.  Once the patient was comfortable under sedation a 50:50 mix of 1% plain lidocaine and 0.5% Bupivacaine was infiltrated in line with the site of the implant which was localized in the preop area with an Ultrasound. I then made a 3cm longitudinal incision in line with the location of the implant. I dissected through the subQ tissue and identified the fascia. The implant was visible beneath the fascia but it was mobile in this position. I incised the fascia and gently dissected around the implant to safely remove the implant in its entirety. I then irrigated the wound and closed in layered fashion with Monocryl. A sterile dressing was applied. The patient was awoken and was transported to the recovery room in stable condition. There were no apparent  intraoperative complications.      POSTOPERATIVE PLAN:   Dressing may be removed at 72 hours. Activities as tolerated.Return to clinic in approximately 2 weeks for followup.      Paz Holloway MD

## 2022-10-17 ENCOUNTER — OFFICE VISIT (OUTPATIENT)
Dept: ORTHOPEDICS | Facility: CLINIC | Age: 27
End: 2022-10-17
Payer: COMMERCIAL

## 2022-10-17 DIAGNOSIS — Z30.46 NEXPLANON REMOVAL: Primary | ICD-10-CM

## 2022-10-17 PROCEDURE — 99024 POSTOP FOLLOW-UP VISIT: CPT | Performed by: ORTHOPAEDIC SURGERY

## 2022-10-17 NOTE — NURSING NOTE
Reason For Visit:   Chief Complaint   Patient presents with     Surgical Followup     1 week s/p left removal of superficial implant (nexplanon) of left upper arm DOS: 10/11/22       PCP: No Ref-Primary, Physician  Ref: Dr. Malinda Elizabeth    ?  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  Work status?  Full time.  Date of injury: No injury.   Type of injury: NA.  Type of surgery: 1 week s/p left removal of superficial implant (nexplanon) of left upper arm DOS: 10/11/22  Smoker: No  Request smoking cessation information: No    Right hand dominant    SANE score  Affected shoulder: Right   Right shoulder SANE: 100  Left shoulder SANE: 100    There were no vitals taken for this visit.    Nupur Parmar, EMT

## 2022-10-17 NOTE — LETTER
10/17/2022         RE: Mray Callaway  [address] [address]        Dear Colleague,    Thank you for referring your patient, Mary Callaway, to the [facility]. Please see a copy of my visit note below.    CHIEF CONCERN: Status post  Removal of Nexplanon left arm  DATE OF SURGERY: 10/11/22    HISTORY OF PRESENT ILLNESS: Mary is a pleasant 26 year-old woman who is 1 week status post the above procedure. She reports some slight bruising but no discomfort that has limited her activities significantly. Denies numbness into hand.    EXAM:  Pleasant adult female in NAD. Seen with her .  Respirations even and unlabored.  Left upper extremity: Incision clean, dry, and intact. Distally neurovascularly intact into the hand without deficits. S    ASSESSMENT:  1. One week status post above procedure    PLAN:  Discussed wound care. Monocryl ends trimmed at skin edge.  ROM as tolerated.  Follow up prn.        Again, thank you for allowing me to participate in the care of your patient.        Sincerely,        Paz Holloway MD

## 2022-10-17 NOTE — PROGRESS NOTES
CHIEF CONCERN: Status post  Removal of Nexplanon left arm  DATE OF SURGERY: 10/11/22    HISTORY OF PRESENT ILLNESS: Mary is a pleasant 26 year-old woman who is 1 week status post the above procedure. She reports some slight bruising but no discomfort that has limited her activities significantly. Denies numbness into hand.    EXAM:  Pleasant adult female in NAD. Seen with her .  Respirations even and unlabored.  Left upper extremity: Incision clean, dry, and intact. Distally neurovascularly intact into the hand without deficits. S    ASSESSMENT:  1. One week status post above procedure    PLAN:  Discussed wound care. Monocryl ends trimmed at skin edge.  ROM as tolerated.  Follow up prn.

## 2022-12-14 ENCOUNTER — PRENATAL OFFICE VISIT (OUTPATIENT)
Dept: NURSING | Facility: CLINIC | Age: 27
End: 2022-12-14
Payer: COMMERCIAL

## 2022-12-14 VITALS — WEIGHT: 167 LBS | BODY MASS INDEX: 28.67 KG/M2

## 2022-12-14 DIAGNOSIS — Z34.01 ENCOUNTER FOR SUPERVISION OF NORMAL FIRST PREGNANCY IN FIRST TRIMESTER: Primary | ICD-10-CM

## 2022-12-14 PROCEDURE — 99207 PR NO CHARGE NURSE ONLY: CPT

## 2022-12-14 RX ORDER — PRENATAL VIT/IRON FUM/FOLIC AC 27MG-0.8MG
1 TABLET ORAL DAILY
COMMUNITY

## 2022-12-14 NOTE — PROGRESS NOTES
SUBJECTIVE:     HPI:    This is a 26 year old female patient,  who presents for her first obstetrical visit.    CHRIS: 2023  She is Unknown weeks.  Her cycles are irregular.  Her last menstrual period was abnormal.   Since her LMP, she has experienced  nausea and fatigue).   She denies emesis, abdominal pain, headache, loss of appetite, vaginal discharge, dysuria, pelvic pain, urinary urgency, lightheadedness, urinary frequency, vaginal bleeding, hemorrhoids and constipation.    Additional History: Nexplanon removed 10/17/22  Pescetarian   Lumpectomy of left breast 2019,no breast concerns       Have you travelled during the pregnancy?Yes, If yes, where? [place] if yes, who? Patient and       HISTORY:   Planned Pregnancy: Yes  Marital Status:   Occupation: Acute Rehab Nurse at [employer]   Living in Household: -Juan    Past History:  Her past medical history   Past Medical History:   Diagnosis Date     Breast disorder 2019    Breast lump removal in 2019(left breast)     Varicella 1995    As an infant/child   .      She has a history of  first pregnancy    Since her last LMP she denies use of alcohol, tobacco and street drugs.    Past medical, surgical, social and family history were reviewed and updated in EPIC.        Current Outpatient Medications   Medication     Prenatal Vit-Fe Fumarate-FA (PRENATAL MULTIVITAMIN W/IRON) 27-0.8 MG tablet     acetaminophen (TYLENOL) 325 MG tablet     ondansetron (ZOFRAN ODT) 4 MG ODT tab     No current facility-administered medications for this visit.       ROS:   12 point review of systems negative other than symptoms noted below or in the HPI.  Constitutional: Fatigue  Gastrointestinal: Nausea      OBJECTIVE:     EXAM:  Wt 75.8 kg (167 lb)   LMP 2022   BMI 28.67 kg/m   Body mass index is 28.67 kg/m .    Nurse phone visit completed. Prenatal book and folder (containing standard educational hand-outs and brochures)  will be given at the next  visit to patient. Information in folder reviewed over the phone. Questions answered. Brochure given on optional screening available to assess chromosomal anomalies. Pt undecided NIPS. Pt advised to call the clinic if she has any questions or concerns related to her pregnancy. Prenatal labs future ordered. New prenatal visit scheduled on 12/26/22 with Maryam Hernandez CNM.      No results found for: PAP  Last Pap: 3/30/22 at [facility]    03/30/2022 Cytology Study, Smear or Scraping, Cervical or Vaginal CERVIX     Interpretation nilm   Final [facility]: [id number] [facility] [place]       CERVIX     Category: nil   Final [facility]: [id number] [facility] [place]       CERVIX     Adequacy: endo   Final [facility]: [id number] [facility] [place]       CERVIX     Clinician Provided ICD10: comment   Final [facility]: [id number] [facility]       CERVIX     Performed by: comment   Final [facility]: [id number] [facility] [place]       CERVIX     Note: comment   Final [facility]: [id number] [facility] [place]       CERVIX     Test Methodology: comment   Final [facility]: [id number] [facility] [place]       CERVIX     HPV Aptima negative negative Final [facility]: [id number] [facility]       PHQ-9 score:    PHQ 12/14/2022   PHQ-9 Total Score 2   Q9: Thoughts of better off dead/self-harm past 2 weeks Not at all       OCTAVIA-7 SCORE 8/15/2022 12/14/2022   Total Score 0 (minimal anxiety) 0 (minimal anxiety)   Total Score 0 0       Patient supplied answers from flow sheet for:  Prenatal OB Questionnaire.  Past Medical History  Have you ever recieved care for your mental health? : No  Have you ever been in a major accident or suffered serious trauma?: No  Within the last year, has anyone hit, slapped, kicked or otherwise hurt you?: No  In the last year, has anyone forced you to have sex when you didn't want to?: No    Past Medical History 2   Have you ever received a blood transfusion?: No  Would you accept a blood  transfusion if was medically recommended?: Yes  Does anyone in your home smoke?: No   Is your blood type Rh negative?: Unknown  Have you ever ?: No  Have you been hospitalized for a nonsurgical reason excluding normal delivery?: No  Have you ever had an abnormal pap smear?: No    Past Medical History (Continued)  Do you have a history of abnormalities of the uterus?: Unknown  Did your mother take ZENAIDA or any other hormones when she was pregnant with you?: Unknown  Do you have any other problems we have not asked about which you feel may be important to this pregnancy?: No    Marly Pina RN

## 2022-12-19 NOTE — PROGRESS NOTES
SUBJECTIVE:      HPI:     This is a 26 year old female patient,  who presents for her first obstetrical visit.     CHRIS: 2023  She is 7w1d with an IUP.  Her cycles are irregular.  Her last menstrual period was abnormal.   Since her LMP, she has experienced  nausea and fatigue).   She denies emesis, abdominal pain, headache, loss of appetite, vaginal discharge, dysuria, pelvic pain, urinary urgency, lightheadedness, urinary frequency, vaginal bleeding, hemorrhoids and constipation.     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 .        Have you travelled during the pregnancy? Yes, If yes, where? [place] if yes, who? 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     Past History:  Her past medical history   Past Medical History        Past Medical History:   Diagnosis Date     Breast disorder 2019     Breast lump removal in 2019(left breast)     Varicella      As an infant/child      .       She has a history of  first pregnancy     Since her last LMP she denies use of alcohol, tobacco and street drugs.     Past medical, surgical, social and family history were reviewed and updated in EPIC.               Current Outpatient Medications   Medication     Prenatal Vit-Fe Fumarate-FA (PRENATAL MULTIVITAMIN W/IRON) 27-0.8 MG tablet     acetaminophen (TYLENOL) 325 MG tablet     ondansetron (ZOFRAN ODT) 4 MG ODT tab      No current facility-administered medications for this visit.         ROS:   12 point review of systems negative other than symptoms noted below or in the HPI.  Constitutional: Fatigue  Gastrointestinal: Nausea        OBJECTIVE:      EXAM:  Wt 75.8 kg (167 lb)   LMP 2022   BMI 28.67 kg/m   Body mass  index is 28.67 kg/m .     Nurse phone visit completed. Prenatal book and folder (containing standard educational hand-outs and brochures)  will be given at the next visit to patient. Information in folder reviewed over the phone. Questions answered. Brochure given on optional screening available to assess chromosomal anomalies. Pt undecided NIPS. Pt advised to call the clinic if she has any questions or concerns related to her pregnancy. Prenatal labs future ordered. New prenatal visit scheduled on 12/26/22 with Maryam Hernandez CNM.        No results found for: PAP  Last Pap: 3/30/22 at [facility]     03/30/2022 Cytology Study, Smear or Scraping, Cervical or Vaginal CERVIX     Interpretation nilm   Final [facility]: [id number] [facility] [place]       CERVIX     Category: nil   Final [facility]: [id number] [facility] [place]       CERVIX     Adequacy: endo   Final [facility]: [id number] [facility] [place]       CERVIX     Clinician Provided ICD10: comment   Final [facility]: [id number] [facility] [place]       CERVIX     Performed by: comment   Final [facility]: [id number] [facility] [place]       CERVIX     Note: comment   Final [facility]: [id number] [facility] [place]       CERVIX     Test Methodology: comment   Final [facility]: [id number] [facility] [place]       CERVIX     HPV Aptima negative negative Final [facility]: [id number] [facility] [place]         PHQ-9 score:    PHQ 12/14/2022   PHQ-9 Total Score 2   Q9: Thoughts of better off dead/self-harm past 2 weeks Not at all         OCTAVIA-7 SCORE 8/15/2022 12/14/2022   Total Score 0 (minimal anxiety) 0 (minimal anxiety)   Total Score 0 0         Patient supplied answers from flow sheet for:  Prenatal OB Questionnaire.  Past Medical History  Have you ever recieved care for your mental health? : No  Have you ever been in a major accident or suffered serious trauma?: No  Within the last year, has anyone hit, slapped, kicked or otherwise hurt you?: No  In the last  "year, has anyone forced you to have sex when you didn't want to?: No     Past Medical History 2   Have you ever received a blood transfusion?: No  Would you accept a blood transfusion if was medically recommended?: Yes  Does anyone in your home smoke?: No   Is your blood type Rh negative?: Unknown  Have you ever ?: No  Have you been hospitalized for a nonsurgical reason excluding normal delivery?: No  Have you ever had an abnormal pap smear?: No     Past Medical History (Continued)  Do you have a history of abnormalities of the uterus?: Unknown  Did your mother take ZENAIDA or any other hormones when she was pregnant with you?: Unknown  Do you have any other problems we have not asked about which you feel may be important to this pregnancy?: No           OBJECTIVE:     EXAM:  /68   Ht 1.626 m (5' 4\")   Wt 71.2 kg (157 lb)   LMP 2022   BMI 26.95 kg/m   Body mass index is 26.95 kg/m .    GENERAL: healthy, alert and no distress  EYES: Eyes grossly normal to inspection  MS: no gross musculoskeletal defects noted, no edema  SKIN: no suspicious lesions or rashes  NEURO: Normal strength and tone, mentation intact and speech normal  PSYCH: mentation appears normal, affect normal/bright    ASSESSMENT/PLAN:       ICD-10-CM    1. Encounter for supervision of normal first pregnancy in first trimester  Z34.01 Urine Culture Aerobic Bacterial     UA with Micro          27 year old , 7w1d gestation of pregnancy with CHRIS of 2023, by Last Menstrual Period    Discussed as follows:    Nausea: Discussed trying to take Unisom/ B6. If that does not help, then we can prescribe another medication.     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     Discussed genetic testing. They will probably opt to do it. Handout provided. New ob labs and genetic testing will be done at 10 weeks gestation or at next appointment.     Does not meet " criteria for ASA initiation for the prevention of preeclampsia.     COUNSELING    Instructed on use of triage nurse line and contacting the on call CNM after hours in an emergency.     Symptoms of N&V and fatigue usually start to resolve around 12-16 weeks     Reviewed CNM philosophy, call schedule for labor and delivery, and FSH for delivery    1st OB handout given outlining appointment spacing and CNM information    Reviewed exercise and nutrition    Discussed OTC medications. OB med list given    Encouraged patient to take PNV's/DHA    Will notify patient with lab results when available      Counseling given:   - Follow up in 4 weeks for return OB visit.  - Recommended weight gain for pregnancy: 15-25 lbs.       PLAN/PATIENT INSTRUCTIONS:  New OB booklet    EMMANUEL Mccann CNM

## 2022-12-26 ENCOUNTER — PRENATAL OFFICE VISIT (OUTPATIENT)
Dept: MIDWIFE SERVICES | Facility: CLINIC | Age: 27
End: 2022-12-26
Payer: COMMERCIAL

## 2022-12-26 ENCOUNTER — ANCILLARY PROCEDURE (OUTPATIENT)
Dept: ULTRASOUND IMAGING | Facility: CLINIC | Age: 27
End: 2022-12-26
Payer: COMMERCIAL

## 2022-12-26 VITALS
HEIGHT: 64 IN | SYSTOLIC BLOOD PRESSURE: 108 MMHG | BODY MASS INDEX: 26.8 KG/M2 | DIASTOLIC BLOOD PRESSURE: 68 MMHG | WEIGHT: 157 LBS

## 2022-12-26 DIAGNOSIS — Z34.01 ENCOUNTER FOR SUPERVISION OF NORMAL FIRST PREGNANCY IN FIRST TRIMESTER: ICD-10-CM

## 2022-12-26 DIAGNOSIS — Z11.8 SCREENING FOR CHLAMYDIAL DISEASE: ICD-10-CM

## 2022-12-26 DIAGNOSIS — Z11.3 SCREENING FOR GONORRHEA: ICD-10-CM

## 2022-12-26 DIAGNOSIS — Z34.01 ENCOUNTER FOR SUPERVISION OF NORMAL FIRST PREGNANCY IN FIRST TRIMESTER: Primary | ICD-10-CM

## 2022-12-26 PROBLEM — I47.10 PSVT (PAROXYSMAL SUPRAVENTRICULAR TACHYCARDIA) (H): Status: ACTIVE | Noted: 2019-02-07

## 2022-12-26 PROBLEM — Z30.46 NEXPLANON REMOVAL: Status: RESOLVED | Noted: 2022-09-08 | Resolved: 2022-12-26

## 2022-12-26 LAB
ALBUMIN UR-MCNC: NEGATIVE MG/DL
APPEARANCE UR: CLEAR
BILIRUB UR QL STRIP: NEGATIVE
COLOR UR AUTO: YELLOW
GLUCOSE UR STRIP-MCNC: NEGATIVE MG/DL
HGB UR QL STRIP: NEGATIVE
KETONES UR STRIP-MCNC: 15 MG/DL
LEUKOCYTE ESTERASE UR QL STRIP: NEGATIVE
NITRATE UR QL: NEGATIVE
PH UR STRIP: 7 [PH] (ref 5–7)
SP GR UR STRIP: 1.02 (ref 1–1.03)
UROBILINOGEN UR STRIP-ACNC: 0.2 E.U./DL

## 2022-12-26 PROCEDURE — 87086 URINE CULTURE/COLONY COUNT: CPT | Performed by: ADVANCED PRACTICE MIDWIFE

## 2022-12-26 PROCEDURE — 81003 URINALYSIS AUTO W/O SCOPE: CPT | Performed by: ADVANCED PRACTICE MIDWIFE

## 2022-12-26 PROCEDURE — 99203 OFFICE O/P NEW LOW 30 MIN: CPT | Performed by: ADVANCED PRACTICE MIDWIFE

## 2022-12-26 PROCEDURE — 76801 OB US < 14 WKS SINGLE FETUS: CPT | Performed by: STUDENT IN AN ORGANIZED HEALTH CARE EDUCATION/TRAINING PROGRAM

## 2022-12-26 NOTE — PATIENT INSTRUCTIONS
Thank you for coming to see the Midwives at the   [facility]!    Please take prenatal vitamin with DHA and vitamin D3 2,000-3,000 international unit(s) once daily during the entire pregnancy.    We will notify you about your labs that were drawn today once we get the results back.  If you have MyChart your lab results will be posted there.    Someone from the clinic will send you a [employer] message or call you personally with your results.    If you need any refills of medications please call your pharmacy and they will contact us.    If you have a medical emergency please call 911.    If you have any concerns about today's visit, wish to schedule another appointment, or have an urgent medical concern please call our office at [phone]. You can also make appointments through [employer].    After hours you may also call the clinic number above to be connected with [facility]'s after hours triage nurse.  The nurse can page the midwife on call if needed. There is always a midwife on call 24 hours a day.    Prenatal Care Recommendations:    Before 14 weeks: Dating ultrasound, genetic testing       This ultrasound helps us determine your dates accurately. Innatal (genetic screening test) can be drawn anytime after 10 weeks of gestation.    16 weeks: Optional genetic testing single AFP       This testing helps understand your baby's risk for some genetic abnormalities.    18-22 weeks:  Screening anatomy ultrasound       This testing will look for early growth abnormalities, placenta location, and may tell the baby's gender if you wish to find out.    24-27 weeks: One hour diabetes test (GCT) and complete blood count       This test helps identify diabetes of pregnancy or gestational diabetes.  We also look   at the iron in your blood and how well your blood clots.    28 - 36 weeks: Tetanus shot (Tdap)       This shot helps protect you and your baby from whooping cough.    36 weeks and later: Group B  Strep test (GBS)       This test helps predict if you need antibiotics in labor to prevent infection for your baby.    Anytime September to April:  Flu shot       This shot helps protect you and your family from the flu.  This is especially important during pregnancy.        The typical schedule after your first visit today you can expect:     Visit 2 - 12-16 weeks  Visit 3 - 20 weeks  Visit 4 - 24 weeks  Visit 5 - 28 weeks  Visit 6 - 30 weeks  Visit 7 - 32 weeks  Visit 8 - 34 weeks  Visit 9 - 36 weeks  Weekly after 36 weeks until delivery.        Any time during or after your pregnancy you may experience increased depression and/or mood changes.    We are here to support you. Please contact us if you are:  Feeling anxious  Overwhelmed or sad   Trouble sleeping  Crying uncontrollably  Trouble caring for yourself or baby.  Any thoughts of hurting yourself, your baby, or anyone else    If anything comes up between your visits or you have concerns please don't hesitate to contact us.    Secure access to your medical record:  Use [employer] (secure email communication and access to your chart) to send your primary care provider a message or make an appointment. Ask someone on your Team how to sign up for [employer]. To log on to [employer] or for more information in [employer] please visit the website at [email or web address].       Certified Nurse Midwife (CNM) Team    COLLEEN Garnett, COLLEEN BENITEZ, COLLEEN, [facility]  Maryam Hernandez DNP, EMMANUEL, COLLEEN Cha DNP, EMMANUEL, COLLEEN Medrano DNP, EMMANUEL, COLLEEN    Link to Midwifery Care website: [email or web address]      Again, thank you for choosing the midwives at [facility].  We are excited to be a part of your pregnancy! Please let us know how we can best partner with you to improve your and your family's health.      ------------------------------------------      Remedies for  nausea and vomiting with pregnancy    Eat small frequent meals every 2-3 hours if possible.     Avoid food at extremes of temperature and drinks with carbonation.    Eat foods that appeal to you, avoiding fats and spicy foods.    Avoid liquids with foods.  Drink liquids 30-60 minutes before or after eating and sip slowly.    Bread, pasta, crackers, potatoes, and rice tend to be tolerated the best.    Don't worry about what you eat in the first 3 months, it is more important that you can eat and keep it down.     Try flat ginger ale or ginger tea    Before rising in the morning, eat a small amount of crackers or dry toast.    Peppermint tea    Ginger is a herbal remedy for nausea and you can use it in any form.  There are ginger tablets you can purchase.  The dose 1000 mg a day in divided doses.     You may also try doxylamine (Unisom) 12.5 mg three times a day which is a sleeping medication along with Vitamin B6 25 mg three times a day.  This combination takes up to a week to work so give it some time.     Benadryl (diphenhydramine) 25-50 mg every 8 hour or Dramamine (dimenhydrinate)  mg by mouth every 4-6 hours. Both of these medication may cause some drowsiness    Other things that may help include an Accupressure band and acupuncture    If these methods fail there are many prescriptions that we can try    If you begin to vomit more than 5 or 6 times a day and feel that you are unable to keep anything down, call the [facility] at [phone]      ------------------------------------------      Constipation    Constipation can be caused by many factors such as poor diet, lack of activity, and medications. Often times women experience more constipation during pregnancy due to decreased intestinal motility.    DRINK TONS OF WATER! 2.5 liters (4 pints) of water per day    Activity is very important. Increase your daily activity to at least 20-60 minutes. This increases blood flow to your  gut and improves bowel function  Limit caffeine and alcohol intake  Avoid foods you've identified as constipating  Increase fiber intake: there are ways to increase you fiber through your diet but there are also OTC agents such as Metamucil or Benfiber that you can supplement your diet with  Use probiotics such as Florastor and/or prebiotics such as oligosaccharides  Consider using an Omega 3 supplement, flaxseed and donovan seeds are great sources  Plan adequate time for elimination, it is most effective right after activity, and it may be beneficial to plan a consistent time daily    Food Suggestions    Eat beans, nuts, whole grain breads and cereals, oats, barley, figs, apples with skin, raisins, green leafy vegetables, fresh and dried fruits (especially grapes), prunes or prune juice  Eat popcorn nightly  Eat 2-3 salads per day  Add a pinch of cayenne pepper to food  1-2 tbsp of olive oil per day  Lemon juice and/or honey in warm water every morning before breakfast  Decrease red meat, refined white flour products like white rice, white bread and pasta  Tea infusions of: rosemary, chamomile, lemon balm, senna leaf, alfalfa, fennel seeds, lavender, cascara, dandelion, licorice root tea, psyllium seeds, marshmallow root    Other remedies    Increase Vitamin B and E (800 IU if not pregnant, 400 IU if pregnant per day) and potassium, calcium, and magnesium supplements      If these remedies fail, medications are an option as well. Please talk with your midwife if you continue to struggle with constipation. If you are pregnant please double check with us before starting any medications!      --------------------------------------------        Fish Safety During Pregnancy    Often time's women worry about eating fish during pregnancy. Fish can be totally safe to eat during pregnancy.However, some fish may contain contaminants that could harm you or your baby if you eat certain types of fish or eat fish too often. Below  is a list of which types of fish to eat, how often to eat fish, and which types of fish to avoid while pregnant.    Reasons to eat fish:  Fish is a great source of protein, vitamins, and minerals.  The oils found in fish are important to unborn and breast fed babies  Eating fish may play a role in the prevention of heart disease in adults       Fish to EAT while pregnant   2 servings per week of any of these types of fish    Catfish (farm raised)  Cod    Crab    Blanco    Flatfish   Oysters    [place]   [place] ([place]     Sardines   Scallops    Shrimp   Tilapia   1 serving per week of any of these fish    Canned LIGHT tuna     [place] caught-        [place]   [place]    Yellow Perch   1 serving per MONTH of these types of fish    Canned WHITE tuna  [place]    Grouper   Halibut    [place]    [place] Roughy    Tuna steak   [place] caught-    Bass    Catfish    [place] smaller than 20 inches    [place] smaller than 30 inches       Servings of fish should be based on your weight, 1 oz for every 20 lbs of body weight. For example: 130 lb person can safely eat 7 oz     150 lb person can safely eat 8 oz     170 lb person can safely eat 9 oz    Make sure to space out meals with fish throughout the month, don't eat all your fish meals for the month within a few days.       Fish to Avoid while Pregnant:      Shark   Chalo Mackerel    Swordfish  Raw Sushi-any type of fish    Tile fish         [place] Caught:      [place] longer than 20 in    [place] longer than 30 in    Musky      Contaminants:    Mercury comes from air pollution and small amounts of mercury can damage the brain that is just starting to form or grow. Too much mercury may affect a child's behavior and lead to learning problems later in life. Too much mercury in adults and older children may cause tingling, numbness in hands and feet, or vision changes  PCBs a man-made substance once used in electrical transformers but  was banned in 1967 although it can still be found in the [place] and [place]. A baby who are exposed to PCBs during pregnancy may have a lower birth weight, reduced head size, and delayed physical development. Exposure to PCBs may also cause cancer  PFOS is a man-made chemical to make products that resist heat, oil, stains, and grease. Studies in lab animals exposed to low levels of PFOS showed decreas HDL (good cholesterol) and changes in thyroid hormone levels. The concern about PFOS is with long-term exposure such as consuming large amounts over a long period of time   Mercury and PFOS cannot be removed through cooking or cleaning. By removing fat when cleaning and cooking you can reduce PCBs.      For more information and up to date information about fish safety in [place] please contact the [place] Department of Health ([facility]) or the Department of Natural Resources (DNR):    [email or web address].  DNR: [phone]  [facility]: [phone]      -------------------------------------------      Recommendations for total and rate of weight gain during pregnancy, by prepregnancy BMI    Total weight gain Rates of weight gain*  2nd and 3rd trimester   Prepregnancy BMI Range in kg Range in lbs Mean (range) in kg/week Mean (range) in lbs/week   Underweight (<18.5 kg/m2) 12.5-18 28-40 0.51 (0.44-0.58) 1 (1-1.3)   Normal weight (18.5-24.9 kg/m2) 11.5-16 25-35 0.42 (0.35-0.50) 1 (0.8-1)   Overweight (25.0-29.9 kg/m2) 7-11.5 15-25 0.28 (0.23-0.33) 0.6 (0.5-0.7)   Obese (?30.0 kg/m2) 5-9 11-20 0.22 (0.17-0.27) 0.5 (0.4-0.6)   BMI: body mass index.  * Calculations assume a 0.5-2 kg (1.1-4.4 lbs) weight gain in the first trimester.  * To calculate BMI go to [email or web address]

## 2022-12-28 LAB — BACTERIA UR CULT: NO GROWTH

## 2023-01-22 LAB
ABO/RH(D): NORMAL
ANTIBODY SCREEN: NEGATIVE
SPECIMEN EXPIRATION DATE: NORMAL

## 2023-01-22 NOTE — PROGRESS NOTES
11w0d Here with Juan today  Patient feels well overall other than difficulty sleeping. Questions if Unisom is okay to take. Discussed okay to take during pregnancy  Patient has had nausea and has not had vomiting  Switched her prenatal vitamin and that has helped   Denies questions about diet, exercise and normal weight gain  Normal to feel movement between 18-22 weeks  Needs 1st OB labs today  Genetic screening: Requests today  GC/CT screening declined  Warning signs discussed    Return to clinic 4 weeks    Lisa BENITEZ CNM

## 2023-01-23 ENCOUNTER — PRENATAL OFFICE VISIT (OUTPATIENT)
Dept: MIDWIFE SERVICES | Facility: CLINIC | Age: 28
End: 2023-01-23
Payer: COMMERCIAL

## 2023-01-23 VITALS — DIASTOLIC BLOOD PRESSURE: 66 MMHG | WEIGHT: 156.6 LBS | SYSTOLIC BLOOD PRESSURE: 106 MMHG | BODY MASS INDEX: 26.88 KG/M2

## 2023-01-23 DIAGNOSIS — Z13.71 SCREENING FOR GENETIC DISEASE CARRIER STATUS: ICD-10-CM

## 2023-01-23 DIAGNOSIS — Z34.01 ENCOUNTER FOR SUPERVISION OF NORMAL FIRST PREGNANCY IN FIRST TRIMESTER: Primary | ICD-10-CM

## 2023-01-23 LAB
ERYTHROCYTE [DISTWIDTH] IN BLOOD BY AUTOMATED COUNT: 12.6 % (ref 10–15)
HCT VFR BLD AUTO: 42.9 % (ref 35–47)
HGB BLD-MCNC: 14.3 G/DL (ref 11.7–15.7)
MCH RBC QN AUTO: 30.3 PG (ref 26.5–33)
MCHC RBC AUTO-ENTMCNC: 33.3 G/DL (ref 31.5–36.5)
MCV RBC AUTO: 91 FL (ref 78–100)
PLATELET # BLD AUTO: 321 10E3/UL (ref 150–450)
RBC # BLD AUTO: 4.72 10E6/UL (ref 3.8–5.2)
TSH SERPL DL<=0.005 MIU/L-ACNC: 0.56 UIU/ML (ref 0.3–4.2)
WBC # BLD AUTO: 10.6 10E3/UL (ref 4–11)

## 2023-01-23 PROCEDURE — 86901 BLOOD TYPING SEROLOGIC RH(D): CPT | Performed by: ADVANCED PRACTICE MIDWIFE

## 2023-01-23 PROCEDURE — 86762 RUBELLA ANTIBODY: CPT | Performed by: ADVANCED PRACTICE MIDWIFE

## 2023-01-23 PROCEDURE — 36415 COLL VENOUS BLD VENIPUNCTURE: CPT | Performed by: ADVANCED PRACTICE MIDWIFE

## 2023-01-23 PROCEDURE — 86850 RBC ANTIBODY SCREEN: CPT | Performed by: ADVANCED PRACTICE MIDWIFE

## 2023-01-23 PROCEDURE — 84443 ASSAY THYROID STIM HORMONE: CPT | Performed by: ADVANCED PRACTICE MIDWIFE

## 2023-01-23 PROCEDURE — 99207 PR PRENATAL VISIT: CPT | Performed by: ADVANCED PRACTICE MIDWIFE

## 2023-01-23 PROCEDURE — 87340 HEPATITIS B SURFACE AG IA: CPT | Performed by: ADVANCED PRACTICE MIDWIFE

## 2023-01-23 PROCEDURE — 86900 BLOOD TYPING SEROLOGIC ABO: CPT | Performed by: ADVANCED PRACTICE MIDWIFE

## 2023-01-23 PROCEDURE — 86803 HEPATITIS C AB TEST: CPT | Performed by: ADVANCED PRACTICE MIDWIFE

## 2023-01-23 PROCEDURE — 85027 COMPLETE CBC AUTOMATED: CPT | Performed by: ADVANCED PRACTICE MIDWIFE

## 2023-01-23 PROCEDURE — 87389 HIV-1 AG W/HIV-1&-2 AB AG IA: CPT | Performed by: ADVANCED PRACTICE MIDWIFE

## 2023-01-23 PROCEDURE — 86780 TREPONEMA PALLIDUM: CPT | Performed by: ADVANCED PRACTICE MIDWIFE

## 2023-01-24 PROBLEM — Z34.01 ENCOUNTER FOR SUPERVISION OF NORMAL FIRST PREGNANCY IN FIRST TRIMESTER: Status: ACTIVE | Noted: 2022-12-26

## 2023-01-24 LAB
HBV SURFACE AG SERPL QL IA: NONREACTIVE
HCV AB SERPL QL IA: NONREACTIVE
HIV 1+2 AB+HIV1 P24 AG SERPL QL IA: NONREACTIVE
RUBV IGG SERPL QL IA: 1.37 INDEX
RUBV IGG SERPL QL IA: POSITIVE
T PALLIDUM AB SER QL: NONREACTIVE

## 2023-01-30 LAB — SCANNED LAB RESULT: NORMAL

## 2023-02-20 ENCOUNTER — PRENATAL OFFICE VISIT (OUTPATIENT)
Dept: MIDWIFE SERVICES | Facility: CLINIC | Age: 28
End: 2023-02-20
Payer: COMMERCIAL

## 2023-02-20 VITALS — DIASTOLIC BLOOD PRESSURE: 60 MMHG | SYSTOLIC BLOOD PRESSURE: 120 MMHG | BODY MASS INDEX: 27.05 KG/M2 | WEIGHT: 157.6 LBS

## 2023-02-20 DIAGNOSIS — Z34.02 ENCOUNTER FOR SUPERVISION OF NORMAL FIRST PREGNANCY IN SECOND TRIMESTER: Primary | ICD-10-CM

## 2023-02-20 PROCEDURE — 99207 PR PRENATAL VISIT: CPT | Performed by: ADVANCED PRACTICE MIDWIFE

## 2023-02-20 NOTE — PROGRESS NOTES
15w1d  Here with Juan today. Since Friday has noticed a itchy body rash, with itching worse at night. It began on her arms and by Saturday moved to legs, sides and tops of feet. Denies any itching on palms of hands or soles of feet. States her sister had a hx of cholestasis in pregnancy  Has tried Benadryl, Unisom, oatmeal lotion and Calamine lotion with minimal relief. Denies any changes to soaps or other products  States yesterday she was in a room very briefly with a patient who had shingles. She had proper PPE on. She has a hx of chicken pox.   Discussed OTC comfort measures for rash  (Hydrocortisone cream, anti allergy meds, SARNA lotion, continuing with calamine and oatmeal lotion as needed) If symptoms continue or worsen, advised to call for rx strength steroid cream or would consider Vistaril. May need to consider Acyclovir treatment if shingles is suspected.   Reviewed common derm skin conditions in pregnancy.   Rash visualized, no open or weeping sores. Small, pin prick red dots on back of arms, lower wrists, some on hands and some areas on feet.     Patient has not had nausea and has not had vomiting  Denies questions about diet, exercise and normal weight gain  Normal to feel movement between 18-22 weeks  Reviewed labs from 1st OB  Discussed genetic screening; patient has had  Warning signs discussed  NOT a candidate for low dose ASA therapy    Return to clinic 4 weeks    Lisa BENITEZ CNM    Next visit: Ask about AFP again         yes

## 2023-02-20 NOTE — PATIENT INSTRUCTIONS
Constipation    Constipation can be caused by many factors such as poor diet, lack of activity, and medications. Often times women experience more constipation during pregnancy due to decreased intestinal motility.    DRINK TONS OF WATER! 2.5 liters (4 pints) of water per day    Activity is very important. Increase your daily activity to at least 20-60 minutes. This increases blood flow to your gut and improves bowel function  Limit caffeine and alcohol intake  Avoid foods you've identified as constipating  Increase fiber intake: there are ways to increase you fiber through your diet but there are also OTC agents such as Metamucil or Benfiber that you can supplement your diet with  Use probiotics such as Florastor and/or prebiotics such as oligosaccharides  Consider using an Omega 3 supplement, flaxseed and donovan seeds are great sources  Plan adequate time for elimination, it is most effective right after activity, and it may be beneficial to plan a consistent time daily    Food Suggestions    Eat beans, nuts, whole grain breads and cereals, oats, barley, figs, apples with skin, raisins, green leafy vegetables, fresh and dried fruits (especially grapes), prunes or prune juice  Eat popcorn nightly  Eat 2-3 salads per day  Add a pinch of cayenne pepper to food  1-2 tbsp of olive oil per day  Lemon juice and/or honey in warm water every morning before breakfast  Decrease red meat, refined white flour products like white rice, white bread and pasta  Tea infusions of: rosemary, chamomile, lemon balm, senna leaf, alfalfa, fennel seeds, lavender, cascara, dandelion, licorice root tea, psyllium seeds, marshmallow root    Other remedies    Increase Vitamin B and E (800 IU if not pregnant, 400 IU if pregnant per day) and potassium, calcium, and magnesium supplements      If these remedies fail, medications are an option as well. Please talk with your midwife if you continue to struggle with constipation. If you are pregnant  please double check with us before starting any medications!     Fiber Facts    A daily intake of about 25-30 grams of fiber is recommended. Most Americans only get about 12 grams of fiber on average. Fiber is very important in the diet to promote bowel regularity, lower cholesterol, lower risk of developing type 2 diabetes, and decrease chance of weight gain.  In pregnancy your intestinal motility slows down, so fiber is even more important.    Start gradually increasing fiber intake to reduce the risk of bloating and gas. Increase by about 5 grams a day every few days until you reach your fiber goals!    Food      Amount   Grams of Fiber  Grains  [place]'s All Bran Cereal   1/2 cup   10  Natural Oven's Stay Trim Bread 1 slice    5  Brown Rice (cooked)  1 cup    4  Cheerios    1 cup    3  Whole Wheat Crackers  5 crackers   3.5  Rye crackers    3 crackers   3    Cereals and whole grains are excellent ways to increase fiber in your diet. Try to find cereals that have at least 8 grams of fiber per serving.    Fruits  Blackberries     1 cup    7   Figs      3 dried   7  Apple      1     4  Pear     1    4   Orange    1    3    Vegetables  Winter squash   1 cup    9  Broccoli     1 cup    4  Corn      1 cup    4  Swiss chard (cooked)  1 cup     4  Cauliflower     1 cup     3  Potato     1 large w/skin  5    Beans  Kidney, red    1/2 cup   8  Lentils     1/2 cup cooked  8  Black     1/2 cup    7  Navy     1/2 cup   7  Martin     1/2 cup   7  White      1/2 cup   6  Garbanzo/Chickpeas  1/2 cup   5

## 2023-02-28 ENCOUNTER — TELEPHONE (OUTPATIENT)
Dept: MIDWIFE SERVICES | Facility: CLINIC | Age: 28
End: 2023-02-28

## 2023-02-28 ENCOUNTER — PRENATAL OFFICE VISIT (OUTPATIENT)
Dept: MIDWIFE SERVICES | Facility: CLINIC | Age: 28
End: 2023-02-28
Payer: COMMERCIAL

## 2023-02-28 VITALS — BODY MASS INDEX: 27.46 KG/M2 | WEIGHT: 160 LBS

## 2023-02-28 DIAGNOSIS — L29.89 PALMAR PRURITUS: ICD-10-CM

## 2023-02-28 DIAGNOSIS — Z34.02 ENCOUNTER FOR SUPERVISION OF NORMAL FIRST PREGNANCY IN SECOND TRIMESTER: ICD-10-CM

## 2023-02-28 DIAGNOSIS — L29.9 ITCHING: Primary | ICD-10-CM

## 2023-02-28 PROCEDURE — 83789 MASS SPECTROMETRY QUAL/QUAN: CPT | Mod: 90

## 2023-02-28 PROCEDURE — 36415 COLL VENOUS BLD VENIPUNCTURE: CPT

## 2023-02-28 PROCEDURE — 82248 BILIRUBIN DIRECT: CPT

## 2023-02-28 PROCEDURE — 99213 OFFICE O/P EST LOW 20 MIN: CPT

## 2023-02-28 PROCEDURE — 80053 COMPREHEN METABOLIC PANEL: CPT

## 2023-02-28 PROCEDURE — 99000 SPECIMEN HANDLING OFFICE-LAB: CPT

## 2023-02-28 RX ORDER — HYDROCORTISONE 2.5 %
CREAM (GRAM) TOPICAL 2 TIMES DAILY
Qty: 30 G | Refills: 0 | Status: SHIPPED | OUTPATIENT
Start: 2023-02-28 | End: 2023-07-17

## 2023-02-28 RX ORDER — HYDROXYZINE PAMOATE 50 MG/1
50 CAPSULE ORAL
Qty: 20 CAPSULE | Refills: 0 | Status: SHIPPED | OUTPATIENT
Start: 2023-02-28 | End: 2023-07-17

## 2023-02-28 RX ORDER — HYDROXYZINE PAMOATE 50 MG/1
50 CAPSULE ORAL 3 TIMES DAILY PRN
Status: CANCELLED | OUTPATIENT
Start: 2023-02-28

## 2023-02-28 NOTE — TELEPHONE ENCOUNTER
Advise office appointment today for evaluation, if no appointment availability then please schedule appt with on call COLLEEN (Semaj)    EMMANUEL Jefferson, COLLEEN

## 2023-02-28 NOTE — TELEPHONE ENCOUNTER
16w2d,     Pt states the rash have resolved.  Itchiness now noted on soles of feet, and hands all over.  States much worse at night and makes it hard to sleep due to the excessive itching.    Pt had been using zyrtec and oatmeal lotion in the AM  PM taking benedryl and hydrocortisone cream.    Has been miserable for the past two days, reiterates that the rash has resolved. And the itching is isolated to the hands and feet.    Routing to provider to advise.  Babatunde Bosch RN on 2023 at 8:46 AM

## 2023-02-28 NOTE — TELEPHONE ENCOUNTER
Returned pt call  Recommendations given and pt being seen at 1620 w Julia Medrano CNM.    Pt verbalized understanding, in agreement with plan, and voiced no further questions.  Gina Eastman RN on 2/28/2023 at 9:20 AM

## 2023-02-28 NOTE — PROGRESS NOTES
SUBJECTIVE:                                                   Mary Callaway is a 27 year old who presents to clinic today for the following health issue(s):  Patient presents with: Prenatal Care: 16w2d, rash hands and feet, prescription given at last appt worked but now itching is worse on palms of hands and soles of feet.    HPI:  Pt was seen on 2/20/2023 with complaints of itching on backs of hands and ankles/lower legs. She was provided with counseling of home management of itching and rash. She has been taking Benedryl in the AM, oatmeal lotion during the day; as well as using benedryl and hydrocortisone cream at bedtime. Her symptoms were much improved by the 5th day of this regimen and she had 2-3 nights of actually being able to sleep. Rash has improved; no current/new rashes.    Now, for the past few nights, itching has worsened and is isolated to hands, especially palms, and soles of feet. States that symptoms are much worse at night, especially itching soles of feet which prevent her from sleeping.    Brings concern that her sister had ICP in a previous pregnancy and she is worried that this could be what is occurring now.     Problem list and histories reviewed & adjusted, as indicated.  Additional history: as documented.    Patient Active Problem List   Diagnosis     Encounter for supervision of normal first pregnancy in first trimester     PSVT (paroxysmal supraventricular tachycardia) (H)     Past Surgical History:   Procedure Laterality Date     BREAST SURGERY  2019    Lump removal     HERNIA REPAIR  1998    infant     REMOVE HARDWARE UPPER EXTREMITY Left 10/11/2022    Procedure: left removal of superficial implant (Nexplanon) left upper arm;  Surgeon: Paz Holloway MD;  Location: [facility]      Social History     Tobacco Use     Smoking status: Never     Passive exposure: Never     Smokeless tobacco: Never   Substance Use Topics     Alcohol use: Not Currently     Comment: Socially       Problem (# of Occurrences) Relation (Name,Age of Onset)    Anxiety Disorder (1) Sister (Sanjuanita Mast)    Mental Illness (1) Sister (Sanjuanita Mast)    Anesthesia Reaction (1) Mother (Adela Mast)    Asthma (1) Father (Rudolph Mast)    Depression (1) Sister (Sanjuanita Mast)    Hypertension (1) Paternal Grandmother (Richardson Mast)    Osteoporosis (1) Paternal Grandmother (Richardson Mast)    Cerebrovascular Disease (1) Paternal Grandmother (Richardson Mast)    Breast Cancer (2) Maternal Grandmother (Richardson Ragland), Paternal Grandmother (Grandaudrey Mast)    Hyperlipidemia (1) Paternal Grandmother (Richardson Mast)    Colon Cancer (1) Father (Rudolph Mast)    Pulmonary Embolism (1) Mother (Adela Mast): multiple blood clots in lungs 2022            Current Outpatient Medications   Medication Sig     acetaminophen (TYLENOL) 325 MG tablet Take 2 tablets (650 mg) by mouth every 4 hours as needed for mild pain     doxylamine (UNISOM) 25 MG TABS tablet Take 25 mg by mouth At Bedtime     hydrocortisone 2.5 % cream Apply topically 2 times daily     hydrOXYzine (VISTARIL) 50 MG capsule Take 1 capsule (50 mg) by mouth nightly as needed for itching     ondansetron (ZOFRAN ODT) 4 MG ODT tab Take 1 tablet (4 mg) by mouth every 8 hours as needed for nausea     Prenatal Vit-Fe Fumarate-FA (PRENATAL MULTIVITAMIN W/IRON) 27-0.8 MG tablet Take 1 tablet by mouth daily     No current facility-administered medications for this visit.     Allergies   Allergen Reactions     Lactose Other (See Comments)       ROS:  12 point review of systems negative other than symptoms noted below or in the HPI.    OBJECTIVE:     Wt 72.6 kg (160 lb)   LMP 11/06/2022   BMI 27.46 kg/m    Body mass index is 27.46 kg/m .    PHYSICAL EXAM:  Skin:   Hands: dorsal aspect of hands dry with healing excoriation marks. No open skin, edema, or erythema noted. Palms are without rash or lesions, no erythema noted.    Ankles/Feet: Posterior skin along ankles bilaterally  with healing excoriation marks. No open skin, erythema or edema noted. Soles of feet are without rash or lesions, no erythema noted.    ASSESSMENT/PLAN:                                                        ICD-10-CM    1. Itching  L29.9 Comprehensive metabolic panel (BMP + Alb, Alk Phos, ALT, AST, Total. Bili, TP)     Bilirubin direct     Bile acids fractionated and total     hydrocortisone 2.5 % cream     hydrOXYzine (VISTARIL) 50 MG capsule     Comprehensive metabolic panel (BMP + Alb, Alk Phos, ALT, AST, Total. Bili, TP)     Bilirubin direct     Bile acids fractionated and total      2. Encounter for supervision of normal first pregnancy in second trimester  Z34.02 Comprehensive metabolic panel (BMP + Alb, Alk Phos, ALT, AST, Total. Bili, TP)     Bilirubin direct     hydrocortisone 2.5 % cream     hydrOXYzine (VISTARIL) 50 MG capsule     Comprehensive metabolic panel (BMP + Alb, Alk Phos, ALT, AST, Total. Bili, TP)     Bilirubin direct      3. Palmar pruritus  L29.8         Discussed that symptoms are very comparable to what is seen in ICP, though this typically occurs much later in pregnancy. ICP labs drawn to evaluate for pathology.    Orders placed for prescription strength steroid cream and vistaril for management of itching.     RTC if symptoms to not resolve in one week, otherwise plan to maintain current prenatal care schedule for now.    EMMANUEL Blakely CNM

## 2023-03-01 LAB
ALBUMIN SERPL BCG-MCNC: 4.2 G/DL (ref 3.5–5.2)
ALP SERPL-CCNC: 46 U/L (ref 35–104)
ALT SERPL W P-5'-P-CCNC: 16 U/L (ref 10–35)
ANION GAP SERPL CALCULATED.3IONS-SCNC: 13 MMOL/L (ref 7–15)
AST SERPL W P-5'-P-CCNC: 24 U/L (ref 10–35)
BILIRUB DIRECT SERPL-MCNC: <0.2 MG/DL (ref 0–0.3)
BILIRUB SERPL-MCNC: 0.3 MG/DL
BUN SERPL-MCNC: 10.9 MG/DL (ref 6–20)
CALCIUM SERPL-MCNC: 9.4 MG/DL (ref 8.6–10)
CHLORIDE SERPL-SCNC: 104 MMOL/L (ref 98–107)
CREAT SERPL-MCNC: 0.47 MG/DL (ref 0.51–0.95)
DEPRECATED HCO3 PLAS-SCNC: 21 MMOL/L (ref 22–29)
GFR SERPL CREATININE-BSD FRML MDRD: >90 ML/MIN/1.73M2
GLUCOSE SERPL-MCNC: 78 MG/DL (ref 70–99)
POTASSIUM SERPL-SCNC: 3.7 MMOL/L (ref 3.4–5.3)
PROT SERPL-MCNC: 7.3 G/DL (ref 6.4–8.3)
SODIUM SERPL-SCNC: 138 MMOL/L (ref 136–145)

## 2023-03-06 LAB
BILE AC SERPL-SCNC: 2.9 UMOL/L
CDCAE SERPL-SCNC: 1.2 UMOL/L
CHOLATE SERPL-SCNC: 0.3 UMOL/L
DO-CHOLATE SERPL-SCNC: 1.1 UMOL/L
URSODEOXYCHOLATE SERPL-SCNC: 0.3 UMOL/L

## 2023-03-15 NOTE — PROGRESS NOTES
19w1d  Feels well, here with . They were the same block at [facility].   Fetal movement: positive, she thinks she has felt a little  Denies loss of fluid/vb/contractions  Anatomy ultrasound results discussed; final US report sent via [facility]  GCT visit between 24-28 weeks, handout provided, reminded of longer appointment  Round ligament pain and comfort measures reviewed  Return to clinic 4 weeks    EMMANUEL Levin, TONIAM

## 2023-03-20 ENCOUNTER — ANCILLARY PROCEDURE (OUTPATIENT)
Dept: ULTRASOUND IMAGING | Facility: CLINIC | Age: 28
End: 2023-03-20
Attending: ADVANCED PRACTICE MIDWIFE
Payer: COMMERCIAL

## 2023-03-20 ENCOUNTER — PRENATAL OFFICE VISIT (OUTPATIENT)
Dept: MIDWIFE SERVICES | Facility: CLINIC | Age: 28
End: 2023-03-20
Attending: ADVANCED PRACTICE MIDWIFE
Payer: COMMERCIAL

## 2023-03-20 VITALS — SYSTOLIC BLOOD PRESSURE: 102 MMHG | WEIGHT: 163 LBS | BODY MASS INDEX: 27.98 KG/M2 | DIASTOLIC BLOOD PRESSURE: 68 MMHG

## 2023-03-20 DIAGNOSIS — Z34.02 ENCOUNTER FOR SUPERVISION OF NORMAL FIRST PREGNANCY IN SECOND TRIMESTER: ICD-10-CM

## 2023-03-20 PROCEDURE — 76805 OB US >/= 14 WKS SNGL FETUS: CPT | Performed by: OBSTETRICS & GYNECOLOGY

## 2023-03-20 PROCEDURE — 99207 PR PRENATAL VISIT: CPT | Performed by: ADVANCED PRACTICE MIDWIFE

## 2023-03-20 NOTE — PATIENT INSTRUCTIONS
GESTATIONAL DIABETES SCREENING    All pregnant women are screened at least once for diabetes as part of their prenatal care. A woman has gestational diabetes if she has high blood sugars for the first time during pregnancy.    Diabetes can harm your health and the health of your baby.  But if we find the diabetes early in pregnancy we will watch your health closely and prevent further problems.     We will check for gestational diabetes during your visit between 24-28 weeks visit. Please note you can not do this prior to 24 weeks of gestation.    Plan to spend about an hour at the clinic.  When you check in let us know that you will be having your diabetes screening that day.     We will give you a 50 gram glucose drink that you have 5 minutes to consume.  Exactly one hour later you will have draw blood from your arm to check your blood sugar level.    We will call you to let you know if your results are normal.  If the results are normal no more testing will be needed.  If your results are not normal we will discuss follow up testing with you.      You may eat prior to the testing but it is not recommended to eat or drink very sweet things such as pop, juice, candy or dessert type foods.  Eat a high protein, low carb meals prior to testing.    If you have any questions please call:    [facility]    [phone]

## 2023-03-20 NOTE — RESULT ENCOUNTER NOTE
Results discussed directly with patient while patient was present. Any further details documented in the note.   EMMANUEL Delvalle CNM

## 2023-04-17 DIAGNOSIS — Z34.02 ENCOUNTER FOR SUPERVISION OF NORMAL FIRST PREGNANCY IN SECOND TRIMESTER: Primary | ICD-10-CM

## 2023-04-24 NOTE — PROGRESS NOTES
24w2d  Feels well overall. Here with Ujan today.   Fetal movement: positive   Denies loss of fluid/vb/contractions, signs and symptoms preeclampsia  GCT today.     Counseled on signs and symptoms of PTL, preeclampsia, discussed fetal movement awareness    Return to clinic 4 weeks    Maryam Hernandez, ONEYDA, APRN, CNM

## 2023-04-24 NOTE — PATIENT INSTRUCTIONS
Childbirth Education    There are many options for childbirth education in the [place] area. Additionally, there are many online options in other areas in the [place], so please if you find great classes, please let us know!    [place]: [facility] is a boutique type center that is know for its prenatal/  yoga and community like setting. They offer great childbirth education. They are expensive, but people who go there love it. www.Repligen    Mari: Mari offers well-rounded education including preparing for childbirth, breastfeeding, and even baby CPR. www.T3 Search.Voxli    BirthEd: Birth offers similar classes as [place] at similar prices. They focus more on Lamaze style childbirth techniques. www.birthedmn.com    [facility]: I cannot recommend this place enough!!! [facility] mission is to provide high quality prenatal/  education to all [facility] residents. They provide  support, prenatal yoga, childbirth and breastfeeding education. They are a nonprofit organization. For people with Medicaid health care plans, most services are free. But even if you have private insurance, the costs are very reasonable. Plus, the money is funneled back into amazing childbirth education for all! www.everyday-miracles.org    [facility]: [facility] offers free parent topic sessions that occur virtually. These sessions are not comprehensive but can give small doses of education. www.childbirthcollective.org      Tdap (Tetanus, Diphtheria, Pertussis) Vaccine: What You Need to Know    This is a Vaccine Information Statement from the CDC.   Many vaccine information statements are available in Thai and other languages. See www.immunize.org/vis   Hojas de información sobre vacunas están disponibles en español y en muchos otros idiomas. Visite www.immunize.org/vis   1. Why get vaccinated?   Tdap vaccine can prevent tetanus, diphtheria, and pertussis.   Diphtheria and pertussis  spread from person to person. Tetanus enters the body through cuts or wounds.   TETANUS (T) causes painful stiffening of the muscles. Tetanus can lead to serious health problems, including being unable to open the mouth, having trouble swallowing and breathing, or death.  DIPHTHERIA (D) can lead to difficulty breathing, heart failure, paralysis, or death.  PERTUSSIS (aP), also known as  whooping cough,  can cause uncontrollable, violent coughing that makes it hard to breathe, eat, or drink. Pertussis can be extremely serious especially in babies and young children, causing pneumonia, convulsions, brain damage, or death. In teens and adults, it can cause weight loss, loss of bladder control, passing out, and rib fractures from severe coughing.  2. Tdap vaccine   Tdap is only for children 7 years and older, adolescents, and adults.   Adolescents should receive a single dose of Tdap, preferably at age 11 or 12 years.   Pregnant people should get a dose of Tdap during every pregnancy, preferably during the early part of the third trimester, to help protect the  from pertussis. Infants are most at risk for severe, life-threatening complications from pertussis.   Adults who have never received Tdap should get a dose of Tdap.   Also, adults should receive a booster dose of either Tdap or Td (a different vaccine that protects against tetanus and diphtheria but not pertussis) every 10 years , or after 5 years in the case of a severe or dirty wound or burn.   Tdap may be given at the same time as other vaccines.   3. Talk with your health care provider  Tell your vaccination provider if the person getting the vaccine:   Has had an allergic reaction after a previous dose of any vaccine that protects against tetanus, diphtheria, or pertussis , or has any severe, life-threatening allergies  Has had a coma, decreased level of consciousness, or prolonged seizures within 7 days after a previous dose of any pertussis vaccine  (DTP, DTaP, or Tdap)  Has seizures or another nervous system problem  Has ever had Guillain-Barré Syndrome (also called  GBS )  Has had severe pain or swelling after a previous dose of any vaccine that protects against tetanus or diphtheria  In some cases, your health care provider may decide to postpone Tdap vaccination until a future visit.   People with minor illnesses, such as a cold, may be vaccinated. People who are moderately or severely ill should usually wait until they recover before getting Tdap vaccine.   Your health care provider can give you more information.  4. Risks of a vaccine reaction   Pain, redness, or swelling where the shot was given, mild fever, headache, feeling tired, and nausea, vomiting, diarrhea, or stomachache sometimes happen after Tdap vaccination.  People sometimes faint after medical procedures, including vaccination. Tell your provider if you feel dizzy or have vision changes or ringing in the ears.   As with any medicine, there is a very remote chance of a vaccine causing a severe allergic reaction, other serious injury, or death.   5. What if there is a serious problem?  An allergic reaction could occur after the vaccinated person leaves the clinic. If you see signs of a severe allergic reaction (hives, swelling of the face and throat, difficulty breathing, a fast heartbeat, dizziness, or weakness), call 9-1-1 and get the person to the nearest hospital.   For other signs that concern you, call your health care provider.   Adverse reactions should be reported to the Vaccine Adverse Event Reporting System (VAERS). Your health care provider will usually file this report, or you can do it yourself. Visit the VAERS website at www.vaers.hhs.gov or call 1-405.329.2319. VAERS is only for reporting reactions, and VAERS staff members do not give medical advice.   6. The National Vaccine Injury Compensation Program  The National Vaccine Injury Compensation Program (VICP) is a federal  program that was created to compensate people who may have been injured by certain vaccines. Claims regarding alleged injury or death due to vaccination have a time limit for filing, which may be as short as two years. Visit the VICP website at www.[facility].gov/vaccinecompensation or call 1-197.243.2873 to learn about the program and about filing a claim.   7. How can I learn more?   Ask your health care provider.  Call your local or state health department.  Visit the website of the Food and Drug Administration (FDA) for vaccine package inserts and additional information at www.fda.gov/vaccines-blood-biologics/vaccines.  Contact the Centers for Disease Control and Prevention (CDC): Call 1-789.877.1465 ( 4-045-WCY-INFO) or  Visit CDC s website at www.cdc.gov/vaccines.  Vaccine Information Statement   Tdap (Tetanus, Diphtheria, Pertussis) Vaccine  42 U.S.C.   300aa-26  8/6/2021  [employer] last reviewed this educational content on     1538-1965 [employer]. All rights reserved. This information is not intended as a substitute for professional medical care. Always follow your healthcare professional's instructions.        You have been provided the My Labor and Birth Wishes document.  Please review at home and bring to your next prenatal visit. Bring this sheet to the hospital for your birth. Give copies to your care team members and support person.   Additional copies can be found here:  [email or web address]

## 2023-04-25 ENCOUNTER — PRENATAL OFFICE VISIT (OUTPATIENT)
Dept: MIDWIFE SERVICES | Facility: CLINIC | Age: 28
End: 2023-04-25
Payer: COMMERCIAL

## 2023-04-25 ENCOUNTER — LAB (OUTPATIENT)
Dept: LAB | Facility: CLINIC | Age: 28
End: 2023-04-25
Payer: COMMERCIAL

## 2023-04-25 VITALS — DIASTOLIC BLOOD PRESSURE: 60 MMHG | BODY MASS INDEX: 29.18 KG/M2 | SYSTOLIC BLOOD PRESSURE: 102 MMHG | WEIGHT: 170 LBS

## 2023-04-25 DIAGNOSIS — Z34.02 ENCOUNTER FOR SUPERVISION OF NORMAL FIRST PREGNANCY IN SECOND TRIMESTER: Primary | ICD-10-CM

## 2023-04-25 DIAGNOSIS — Z34.02 ENCOUNTER FOR SUPERVISION OF NORMAL FIRST PREGNANCY IN SECOND TRIMESTER: ICD-10-CM

## 2023-04-25 LAB
ERYTHROCYTE [DISTWIDTH] IN BLOOD BY AUTOMATED COUNT: 13.6 % (ref 10–15)
GLUCOSE 1H P 50 G GLC PO SERPL-MCNC: 74 MG/DL (ref 70–129)
HCT VFR BLD AUTO: 38.5 % (ref 35–47)
HGB BLD-MCNC: 12.7 G/DL (ref 11.7–15.7)
MCH RBC QN AUTO: 31.1 PG (ref 26.5–33)
MCHC RBC AUTO-ENTMCNC: 33 G/DL (ref 31.5–36.5)
MCV RBC AUTO: 94 FL (ref 78–100)
PLATELET # BLD AUTO: 284 10E3/UL (ref 150–450)
RBC # BLD AUTO: 4.09 10E6/UL (ref 3.8–5.2)
WBC # BLD AUTO: 17.9 10E3/UL (ref 4–11)

## 2023-04-25 PROCEDURE — 36415 COLL VENOUS BLD VENIPUNCTURE: CPT

## 2023-04-25 PROCEDURE — 85027 COMPLETE CBC AUTOMATED: CPT

## 2023-04-25 PROCEDURE — 82950 GLUCOSE TEST: CPT

## 2023-04-25 PROCEDURE — 99207 PR PRENATAL VISIT: CPT | Performed by: ADVANCED PRACTICE MIDWIFE

## 2023-05-08 ENCOUNTER — HEALTH MAINTENANCE LETTER (OUTPATIENT)
Age: 28
End: 2023-05-08

## 2023-05-18 NOTE — PROGRESS NOTES
28w2d  Feels well, here with Juan. No concerns today   Fetal movement: positive, denies loss of fluid/vb/contractions, signs and symptoms preeclampsia  Tdap given: Yes, to be done today   Rhogam: No, A+    Does the patient have a history of depression or anxiety?No     Reviewed PTL precautions, S&S of preeclampsia and fetal movement awareness, patient verbalizes understanding and what to report  Hospital Registration reminder-online  Return to clinic 2 weeks    EMMANUEL Jefferson, CNM

## 2023-05-23 ENCOUNTER — PRENATAL OFFICE VISIT (OUTPATIENT)
Dept: MIDWIFE SERVICES | Facility: CLINIC | Age: 28
End: 2023-05-23
Payer: COMMERCIAL

## 2023-05-23 VITALS — BODY MASS INDEX: 29.7 KG/M2 | SYSTOLIC BLOOD PRESSURE: 112 MMHG | WEIGHT: 173 LBS | DIASTOLIC BLOOD PRESSURE: 52 MMHG

## 2023-05-23 DIAGNOSIS — Z34.02 ENCOUNTER FOR SUPERVISION OF NORMAL FIRST PREGNANCY IN SECOND TRIMESTER: Primary | ICD-10-CM

## 2023-05-23 PROCEDURE — 90715 TDAP VACCINE 7 YRS/> IM: CPT | Performed by: ADVANCED PRACTICE MIDWIFE

## 2023-05-23 PROCEDURE — 99207 PR PRENATAL VISIT: CPT | Performed by: ADVANCED PRACTICE MIDWIFE

## 2023-05-23 PROCEDURE — 90471 IMMUNIZATION ADMIN: CPT | Performed by: ADVANCED PRACTICE MIDWIFE

## 2023-05-23 NOTE — PATIENT INSTRUCTIONS
SIGNS OF  LABOR    Labor is  if it happens more than three weeks before your due date.    It can be hard to know if you are in labor, since the symptoms can be like the normal feelings of pregnancy.  Often, the only difference is the symptoms increase or they don't go away.     Signs of  labor can include:    Contractions which can feel like period cramps or gas pain.  You may feel it in the lower part of your abdomen, in your back, or as a pressure feeling in your bottom.  It is often regular, coming every 5 or 10 minutes, and  lasting about 30-60 seconds. Some contractions are normal during pregnancy (Arthur aguiar contractions) but if you are feeling more than 5-6 in one hour that is NOT normal  If this occurs empty your bladder, then drink 2-3 glasses of water, eat a snack, and lay down on your left side. Put your hand on your abdomen to count the contractions.  If after one hour of resting you have still had 5-6 contractions call your clinic right away.    If you feel a pop, gush, or trickle of fluid it may mean that your bag of water has broken and you should contact the clinic     You may also experience loose stools and/or rectal pressure     Listen to your body, if something doesn't seem right please call us at the clinic    Risk Factors    Previous  delivery  Bacterial Vaginosis- if you notice a fishy smell to your discharge or experience vaginal itching/discomfort you should be evaluated for infection  Smoking  Drug abuse  Adolescent (teen) pregnancy or advanced maternal age (AMA) age 35 and over  Dehydration (this may not cause  labor but it can cause contractions)    If you think you are in  labor we may do some lab testing in the clinic or send you to the hospital for evaluation    Please call us if you are concerned you are in  labor.    [facility]  [phone]      PREECLAMPSIA SIGNS AND SYMPTOMS    Preeclampsia is a dangerous  "condition that some women develop in the second half of pregnancy. It can also begin after the baby is born.  Preeclampsia causes high blood pressure and can cause problems with many organ systems in your body.  It can also affect the growth of your baby. The exact cause of preeclampsia is unknown, however, there are signs and symptoms to watch for:    -A bad headache that doesn't improve with Tylenol  -Visual changes such as spots, flashes of light, blurry vision  -Pain in the upper right part of your abdomen, especially under the ribs that doesn't go away  -Nausea and/or vomiting  -Feeling extremely tired  -Yellowing of the skin and/or eyes  -Feeling \"not quite right\" or that something is wrong  -An extreme amount of swelling (some swelling in pregnancy is very normal)    If your midwife feels that you are developing preeclampsia, you will have lab tests drawn and will be monitored very closely.     If you are experiencing anyof these symptoms, call the [facility] immediately at [phone].    "

## 2023-06-04 NOTE — PROGRESS NOTES
30w3d  Feels well, here with Juan. No questions or concerns for me today  Fetal Movement: positive, denies loss of fluid/vb, no contractions, denies signs and symptoms preeclampsia  Fetal kick counts reviewed and handout given  Reviewed PTL precautions, s/sx of preeclampsia, fetal movement counts  Encouraged to scheduled out remainder of prenatal appts thru due date     Return to clinic in 2 weeks    EMMANUEL Jefferson, CNM

## 2023-06-04 NOTE — PATIENT INSTRUCTIONS
SIGNS OF  LABOR    Labor is  if it happens more than three weeks before your due date.    It can be hard to know if you are in labor, since the symptoms can be like the normal feelings of pregnancy.  Often, the only difference is the symptoms increase or they don't go away.     Signs of  labor can include:    Contractions which can feel like period cramps or gas pain.  You may feel it in the lower part of your abdomen, in your back, or as a pressure feeling in your bottom.  It is often regular, coming every 5 or 10 minutes, and  lasting about 30-60 seconds. Some contractions are normal during pregnancy (Bay aguiar contractions) but if you are feeling more than 5-6 in one hour that is NOT normal  If this occurs empty your bladder, then drink 2-3 glasses of water, eat a snack, and lay down on your left side. Put your hand on your abdomen to count the contractions.  If after one hour of resting you have still had 5-6 contractions call your clinic right away.    If you feel a pop, gush, or trickle of fluid it may mean that your bag of water has broken and you should contact the clinic     You may also experience loose stools and/or rectal pressure     Listen to your body, if something doesn't seem right please call us at the clinic    Risk Factors    Previous  delivery  Bacterial Vaginosis- if you notice a fishy smell to your discharge or experience vaginal itching/discomfort you should be evaluated for infection  Smoking  Drug abuse  Adolescent (teen) pregnancy or advanced maternal age (AMA) age 35 and over  Dehydration (this may not cause  labor but it can cause contractions)    If you think you are in  labor we may do some lab testing in the clinic or send you to the hospital for evaluation    Please call us if you are concerned you are in  labor.    [facility]  [phone]      PREECLAMPSIA SIGNS AND SYMPTOMS    Preeclampsia is a dangerous  "condition that some women develop in the second half of pregnancy. It can also begin after the baby is born.  Preeclampsia causes high blood pressure and can cause problems with many organ systems in your body.  It can also affect the growth of your baby. The exact cause of preeclampsia is unknown, however, there are signs and symptoms to watch for:    -A bad headache that doesn't improve with Tylenol  -Visual changes such as spots, flashes of light, blurry vision  -Pain in the upper right part of your abdomen, especially under the ribs that doesn't go away  -Nausea and/or vomiting  -Feeling extremely tired  -Yellowing of the skin and/or eyes  -Feeling \"not quite right\" or that something is wrong  -An extreme amount of swelling (some swelling in pregnancy is very normal)    If your midwife feels that you are developing preeclampsia, you will have lab tests drawn and will be monitored very closely.     If you are experiencing anyof these symptoms, call the [facility] immediately at [phone].        Fetal Kick Counts    It is important to know when your baby's movements occur. We often get busy with work and life and do not pay close attention to their movements.      Women typically begin feeling movement between 18-22 weeks of gestation, sometimes it can be earlier or later depending on where your placenta is     Movements usually begin feeling like popping or fluttering and as the baby grows they become more pronounced    Toward the end of pregnancy as the baby gets larger they may not move as much or make as big of movements. Babies have maturing sleep cycles as well as not as much room to move and flip. If you are ever concerned about your baby's movements or have not felt the baby move for a while, we recommend you do a fetal kick count. Prior to starting your count drink a glass of water or juice and eat a snack. Then lay down on your side and begin to count movements.     How to do a " Fetal Kick Counts    There are many different ways to monitor your baby's movements. Movements can range from large jabs to small kicks, or wiggles.  Hiccups count!    Count 10 movements in 2 hours when resting and focusing  Count 10 movements in 12 hours when doing normal activity    We recommend that if movements occur but seem decreased that you should be seen in the clinic or hospital for evaluation within 12 hours. If fetal movement is absent or fetal kick counts are low please contact us right away.    If you ever have any concerns about your baby's movements DO NOT HESITATE to call us, we are here for you!    [facility]  [phone]

## 2023-06-07 ENCOUNTER — PRENATAL OFFICE VISIT (OUTPATIENT)
Dept: MIDWIFE SERVICES | Facility: CLINIC | Age: 28
End: 2023-06-07
Payer: COMMERCIAL

## 2023-06-07 VITALS — DIASTOLIC BLOOD PRESSURE: 60 MMHG | SYSTOLIC BLOOD PRESSURE: 117 MMHG | WEIGHT: 173.8 LBS | BODY MASS INDEX: 29.83 KG/M2

## 2023-06-07 DIAGNOSIS — Z34.02 ENCOUNTER FOR SUPERVISION OF NORMAL FIRST PREGNANCY IN SECOND TRIMESTER: Primary | ICD-10-CM

## 2023-06-07 PROCEDURE — 99207 PR PRENATAL VISIT: CPT | Performed by: ADVANCED PRACTICE MIDWIFE

## 2023-06-20 NOTE — PROGRESS NOTES
32w2d  Feels well, here with Juan, some questions about breast pump, wondering who to put down, hoping to breastfeed. Unsure on peds, they live in [place]. Still having issues with sleep, taking unisom and vistaril occasionally.   Fetal Movement: positive, denies loss of fluid/vb, no contractions, denies signs and symptoms preeclampsia  Peds chosen: No  Discussed [facility] at [facility], and [facility] and [facility]  Pediatrician: Hep B, Vit K, Erythromycin   Plans to breastfeed-Yes  Breastfeeding class planned No-has been doing some reasearch   Discussed lactation resources, will put in referral if needed for outpatient support, will sign any sort of order for her that comes through.  Fetal kick counts/movements reviewed  Reviewed danger signs, signs and symptoms PTL, and s/sx of preeclampsia  Encouraged to use sleep medications as needed, would like her to get a good amount of rest prior to delivery if possible  Handout given via [place] [facility] [place]  Return to clinic 2 weeks    EMMANUEL Levin, CNM

## 2023-06-21 ENCOUNTER — PRENATAL OFFICE VISIT (OUTPATIENT)
Dept: MIDWIFE SERVICES | Facility: CLINIC | Age: 28
End: 2023-06-21
Payer: COMMERCIAL

## 2023-06-21 VITALS — BODY MASS INDEX: 30.31 KG/M2 | SYSTOLIC BLOOD PRESSURE: 110 MMHG | DIASTOLIC BLOOD PRESSURE: 59 MMHG | WEIGHT: 176.6 LBS

## 2023-06-21 DIAGNOSIS — Z34.03 ENCOUNTER FOR SUPERVISION OF NORMAL FIRST PREGNANCY IN THIRD TRIMESTER: Primary | ICD-10-CM

## 2023-06-21 PROCEDURE — 99207 PR PRENATAL VISIT: CPT | Performed by: ADVANCED PRACTICE MIDWIFE

## 2023-06-21 ASSESSMENT — PATIENT HEALTH QUESTIONNAIRE - PHQ9
5. POOR APPETITE OR OVEREATING: NOT AT ALL
SUM OF ALL RESPONSES TO PHQ QUESTIONS 1-9: 0

## 2023-06-21 ASSESSMENT — ANXIETY QUESTIONNAIRES
7. FEELING AFRAID AS IF SOMETHING AWFUL MIGHT HAPPEN: NOT AT ALL
5. BEING SO RESTLESS THAT IT IS HARD TO SIT STILL: NOT AT ALL
6. BECOMING EASILY ANNOYED OR IRRITABLE: NOT AT ALL
GAD7 TOTAL SCORE: 0
GAD7 TOTAL SCORE: 0
1. FEELING NERVOUS, ANXIOUS, OR ON EDGE: NOT AT ALL
2. NOT BEING ABLE TO STOP OR CONTROL WORRYING: NOT AT ALL
IF YOU CHECKED OFF ANY PROBLEMS ON THIS QUESTIONNAIRE, HOW DIFFICULT HAVE THESE PROBLEMS MADE IT FOR YOU TO DO YOUR WORK, TAKE CARE OF THINGS AT HOME, OR GET ALONG WITH OTHER PEOPLE: NOT DIFFICULT AT ALL
3. WORRYING TOO MUCH ABOUT DIFFERENT THINGS: NOT AT ALL

## 2023-06-21 NOTE — PATIENT INSTRUCTIONS
Kick Counts  It s normal to worry about your baby s health. One way you can know your baby s doing well is to record the baby s movements once a day. This is called a kick count.   Remember to take your kick count records to all your appointments with your healthcare provider.  How to count kicks    Time how long it takes you to feel 10 kicks, flutters, swishes, or rolls. Ideally, you want to feel at least 10 movements in 2 hours. You will likely feel 10 movements in less time than that.  Starting at 28 weeks, count your baby's movements daily. Follow your healthcare provider's instructions for kick counting. Here are tips for counting kicks:    Choose a time when the baby is active, such as after a meal.     Sit comfortably or lie on your side.     The first time the baby moves, write down the time.     Count each movement until the baby has moved  10 times. This can take from 20 minutes to 2 hours.     If you haven't felt 10 kicks by the end of the second hour, wait a few hours. Then try again.    Try to do it at the same time each day.  When to call your healthcare provider  Call your healthcare provider  right away if:    You do a couple sets of kick counts during the day and your baby moves fewer than 10 times in 2 hours.    Your baby moves much less often than on the days before.    You haven't felt your baby move all day.  [employer] last reviewed this educational content on 2020-2022 [employer]. All rights reserved. This information is not intended as a substitute for professional medical care. Always follow your healthcare professional's instructions.          Understanding  Labor  Going into labor before week 37 of pregnancy is called  labor.  labor can cause your baby to be born too soon. This can lead to health problems for your baby.     Before labor, the cervix is thick and closed.      In  labor, the cervix begins to efface (thin) and dilate  (open).     Symptoms of  labor  If you think you re having  labor, get medical help right away. Contractions alone don t mean you re in  labor. What matters more are changes in your cervix. The cervix is the opening at the lower end of the uterus. Symptoms of  labor include:    4 or more contractions per hour    Strong contractions    Constant menstrual-like cramping    Low-back pain    Mucous or bloody fluid from the vagina    Bleeding or spotting in the second or third trimester  Evaluating  labor  Your healthcare provider will try to find out if you re in  labor or just having contractions. They may watch you for a few hours. You may have these tests:    Pelvic exam. This is to see if your cervix has effaced (thinned) and dilated (opened).    Uterine activity monitoring. This is used to detect contractions.    Fetal monitoring. This is done to check the health of your baby.    Ultrasound. This test looks at your baby s size and position.    Amniocentesis. This test checks how mature your baby s lungs are.  Caring for yourself at home  If you have  contractions, but your cervix is still thick and closed, your healthcare provider may tell you to:    Drink plenty of water.    Do fewer activities.    Rest in bed on your side.    Don't have intercourse or stimulate your nipples.  When to call your healthcare provider  Call your healthcare provider if you have any of these:    4 or more contractions per hour    Bag of water breaks    Bleeding or spotting  If you need hospital care   labor often means that you need hospital care. You may need complete bed rest. You may have an IV (intravenous) line in your arm or hand. This is to give you fluids. You may be given pills or injections. These are done to help prevent contractions. You may get a medicine called a corticosteroid. This is to help your baby s lungs mature more quickly.  Are you at risk?  Any pregnant  woman can have  labor. It may start for no reason. But these risk factors can increase your chances:    Past  labor or early birth    Smoking, drug, or alcohol use in pregnancy    A multiple pregnancy (twins or more)    Problems with the shape of the uterus    Bleeding during the pregnancy  The dangers of  birth  A baby born too soon may have health problems. This is because the baby didn t have enough time to grow. Some of the risks for your baby include:    Not breastfeeding or feeding well    Having immature lungs    Bleeding in the brain    Death  Reaching term  Your goal is to get as close to term (week 37 or later) as you can before giving birth. The closer you get to term, the higher your chance of having a healthy baby. Work with your healthcare provider. Together, you can take steps that may keep you from giving birth too early.  [employer] last reviewed this educational content on 10/1/2021    1229-3264 [employer]. All rights reserved. This information is not intended as a substitute for professional medical care. Always follow your healthcare professional's instructions.          Pregnancy: Your Third Trimester Changes  As the baby grows, your body changes, too. You may also see signs that your body is getting ready for labor. Be patient. Within a few more weeks, your baby will be born.   How you are changing  Your body is preparing for the birth of your baby. Some of the most common changes are listed below. If you have any questions or concerns, ask your healthcare provider:     You ll gain more weight from fluids, extra blood, and fat deposits.    Your breasts will grow as your body gets ready to feed the baby. They may be more tender. You may also notice a slight yellow or white discharge from the nipples.    Discharge from your vagina may increase. This is normal.    You might see some skin color changes on your forehead, cheeks, or nose. Most of these will go away  after you deliver.  How your baby is growing  Month 7  Your baby can open and close their eyes and weighs around 4 pounds (1.8 kg). If born prematurely (too early), your baby would likely survive with special care.     Month 8  Your baby is building up body fat and weighs around 6 pounds (2.7 kg).    Month 9  Your baby weighs nearly 7 pounds (3.2 kg) and is about 19 to 21 inches long. In other words, any day now...     [employer] last reviewed this educational content on 9/1/2021 2000-2022 [employer]. All rights reserved. This information is not intended as a substitute for professional medical care. Always follow your healthcare professional's instructions.          Birth Control Methods  Birth control methods are used to help prevent pregnancy. There are many different methods to choose from. Talk with your healthcare provider about which method is right for you. Be sure to ask your provider how well each one works. Also ask about the benefits, risks, and side effects of each method.   Hormones  Some birth control methods work by releasing hormones such as progestin and estrogen. These methods include hormone implants, hormone shots, the vaginal ring, the patch, and birth control pills. They all work by stopping the release of the egg from the ovary (ovulation). All of these methods work well and can be stopped at any time.     The implant is a small device that needs to be placed in the upper arm by a trained healthcare provider. It works for up to 3 years.    Hormone injections must be repeated every 3 months.    The vaginal ring must be replaced monthly. It can be removed during the fourth week of each cycle.    The patch must be replaced weekly. It's not worn during the fourth week of each cycle.    Birth control pills must be taken every day.  Intrauterine device (IUD)  An IUD is a small, T-shaped device. It must be placed in the uterus by a trained healthcare provider. There are different  types of IUDs available. They work by causing changes in the uterus that make it harder for sperm to reach the egg. Depending on the type of IUD you have, it may work for several years or longer. The IUD is a reversible birth control method. This means it can be removed at any time.   Condom  A condom is a sheath that forms a thin barrier between the penis and the vagina. It helps prevent pregnancy by keeping sperm from entering the vagina. When latex condoms are used, they have the added benefit of protecting against most STIs (sexually transmitted infections). Condoms are used each time there is sexual intercourse and should be discarded after each use. Ask your healthcare provider about the different types of condoms available. These include both the male condom and female condom.   Spermicide  Spermicides come as foams, jellies, creams, suppositories, and tablets.  They help prevent pregnancy by killing sperm. When used alone they are not that reliable. They work best when combined with other birth control methods such as diaphragms and cervical caps.   Sponge, diaphragm, and cervical cap   All of these methods help prevent pregnancy by covering the opening of the uterus (cervix). This prevents sperm from passing through.   The sponge contains spermicide. It can be bought over the counter. The sponge must be left in place for at least 6 hours after the last time you have sex. However, it should not stay in place for more than 24 hours. Discard after use.   The diaphragm and cervical cap must be fitted and prescribed by your healthcare provider. Both are used with spermicide. The diaphragm must be left in place for at least 6 hours after sex. However, it should not stay in place for more than 24 hours. It can be washed and reused. The cervical cap must be left in place for at least 6 hours after sex. However, it should not stay in place for more than 48 hours. It can be washed and reused.   Withdrawal  method  This is when the man pulls his penis out of the vagina just before ejaculation ( coming ). This lowers the amount of sperm entering the vagina. Be aware that fluids released just before ejaculation often still contain some sperm, so this method is not as reliable as certain other methods.   Rhythm method   This method is also call natural family planning or fertility awareness. It requires that you know when in your menstrual cycle you are likely to become pregnant. Then you not have sex during those days. This requires careful planning and good discipline. Your healthcare provider can explain more about how this works.   Tubal ligation and vasectomy  These are surgical methods to prevent pregnancy. Tubal ligation is an option for women. The fallopian tubes are blocked or cut (ligated). This keeps the egg from passing into the uterus or sperm from reaching the egg. Vasectomy is an option for men. The tubes that normally carry sperm to the penis are either closed or blocked. Both tubal ligation and vasectomy are permanent birth control methods. This means reversal is either not possible or unlikely to work. They are good choices for women and men who know that they don't want to have children in the future.   [employer] last reviewed this educational content on 2020-2022 [employer]. All rights reserved. This information is not intended as a substitute for professional medical care. Always follow your healthcare professional's instructions.          Understanding  Labor  Going into labor before week 37 of pregnancy is called  labor.  labor can cause your baby to be born too soon. This can lead to health problems for your baby.     Before labor, the cervix is thick and closed.      In  labor, the cervix begins to efface (thin) and dilate (open).     Symptoms of  labor  If you think you re having  labor, get medical help right away. Contractions  alone don t mean you re in  labor. What matters more are changes in your cervix. The cervix is the opening at the lower end of the uterus. Symptoms of  labor include:    4 or more contractions per hour    Strong contractions    Constant menstrual-like cramping    Low-back pain    Mucous or bloody fluid from the vagina    Bleeding or spotting in the second or third trimester  Evaluating  labor  Your healthcare provider will try to find out if you re in  labor or just having contractions. They may watch you for a few hours. You may have these tests:    Pelvic exam. This is to see if your cervix has effaced (thinned) and dilated (opened).    Uterine activity monitoring. This is used to detect contractions.    Fetal monitoring. This is done to check the health of your baby.    Ultrasound. This test looks at your baby s size and position.    Amniocentesis. This test checks how mature your baby s lungs are.  Caring for yourself at home  If you have  contractions, but your cervix is still thick and closed, your healthcare provider may tell you to:    Drink plenty of water.    Do fewer activities.    Rest in bed on your side.    Don't have intercourse or stimulate your nipples.  When to call your healthcare provider  Call your healthcare provider if you have any of these:    4 or more contractions per hour    Bag of water breaks    Bleeding or spotting  If you need hospital care   labor often means that you need hospital care. You may need complete bed rest. You may have an IV (intravenous) line in your arm or hand. This is to give you fluids. You may be given pills or injections. These are done to help prevent contractions. You may get a medicine called a corticosteroid. This is to help your baby s lungs mature more quickly.  Are you at risk?  Any pregnant woman can have  labor. It may start for no reason. But these risk factors can increase your chances:    Past   labor or early birth    Smoking, drug, or alcohol use in pregnancy    A multiple pregnancy (twins or more)    Problems with the shape of the uterus    Bleeding during the pregnancy  The dangers of  birth  A baby born too soon may have health problems. This is because the baby didn t have enough time to grow. Some of the risks for your baby include:    Not breastfeeding or feeding well    Having immature lungs    Bleeding in the brain    Death  Reaching term  Your goal is to get as close to term (week 37 or later) as you can before giving birth. The closer you get to term, the higher your chance of having a healthy baby. Work with your healthcare provider. Together, you can take steps that may keep you from giving birth too early.  [employer] last reviewed this educational content on 10/1/2021    5900-8822 [employer]. All rights reserved. This information is not intended as a substitute for professional medical care. Always follow your healthcare professional's instructions.          Adapting to Pregnancy: Third Trimester  Although common during pregnancy, some discomforts may seem worse in the final weeks. Simple lifestyle changes can help. Take care of yourself. And ask your partner to help out with small tasks.   Limiting leg problems  Ways to combat leg issues:    Wear support hose all day.    Don't wear snug shoes or clothes that bind, such as tight pants and socks with elastic tops.    Sit with your feet and legs raised often.  Caring for your breasts  Tips to follow include:    Wash with plain water. Don't use harsh soaps or rubbing alcohol. They may cause dryness.    Wear a nursing bra for extra support. It can also hide any leaks from your nipples.  Controlling hemorrhoids  Ways to prevent hemorrhoids include:     Eat foods that are high in fiber. Also exercise and drink enough fluids. This will reduce constipation and hemorrhoids.    Sleep and nap on your side. This limits pressure on  the veins of your rectum.    Try not to stand or sit for long periods.  Controlling back pain  As your body changes during pregnancy, your back must work in new ways. Back pain has many causes. Physical changes in your body can strain your back and its supporting muscles. Also hormones increase during pregnancy. This can affect how your muscles and joints work together. All of these changes can lead to pain.   Pain may be felt in the upper or lower back. Pain is also common in the pelvis. Some pregnant women have sciatica. This is pain caused by pressure on the sciatic nerve running down the back of the leg. Ice or heat may help. Your provider may advise massage therapy or a chiropractor. Sleep on your left side with a pillow between your knees. Use a brace or support device. Ask your provider for specific tips and exercises to help control your back pain.   Tips to help you rest  Good rest and sleep will help you feel better. Here are some ideas:     Ask your partner to massage your shoulders, neck, or back.    Limit the errands you do each day.    Lie down in the afternoon or after work for a few minutes.    Take a warm bath before you go to sleep.    Drink warm milk or teas without caffeine.    Don't drink coffee, black tea, and cola.  Stopping heartburn    Don't eat spicy, greasy, fried, or acidic foods.    Eat small amounts more often. Eat slowly.   Wait 2 hours after eating before lying down.    Sleep with your upper body raised 6 inches.   Managing mood swings  Ways to manage mood swings include:     Know that mood changes are normal.    Exercise often, but get plenty of rest.    Address any concerns and limit stress. Talking to your partner, other women, or your healthcare provider may help.   Dealing with urinary frequency  Tips to deal with having to urinate often include:     Drink plenty of water all day. But if you drink a lot in the evening, you may have to get up more in the night.    Limit coffee,  black tea, and cola.  How daily issues affect your health  Many things in your daily life impact your health. This can include transportation, money problems, housing, access to food, and . If you can t get to medical appointments, you may not receive the care you need. When money is tight, it may be difficult to pay for medicines. And living far from a grocery store can make it hard to buy healthy food.   If you have concerns in any of these or other areas, talk with your healthcare team. They may know of local resources to assist you. Or they may have a staff person who can help.   [email or web address] last reviewed this educational content on 7/1/2021 2000-2022 [employer]. All rights reserved. This information is not intended as a substitute for professional medical care. Always follow your healthcare professional's instructions.        You have been provided the Any Day Now: Early Labor at Home document.    Additional copies can be found here:  [email or web address]  You have been provided the What I'd Wish I'd Known About Giving Birth document.    Additional copies can be found here:  [email or web address]

## 2023-06-23 ENCOUNTER — MEDICAL CORRESPONDENCE (OUTPATIENT)
Dept: HEALTH INFORMATION MANAGEMENT | Facility: CLINIC | Age: 28
End: 2023-06-23
Payer: COMMERCIAL

## 2023-06-28 ENCOUNTER — MEDICAL CORRESPONDENCE (OUTPATIENT)
Dept: HEALTH INFORMATION MANAGEMENT | Facility: CLINIC | Age: 28
End: 2023-06-28
Payer: COMMERCIAL

## 2023-07-03 NOTE — PROGRESS NOTES
34w1d  Feels overall well, here with Juan today.   Patient reported some blurry vision as she walked into her appointment that resolved by the end of her appointment. Denies headache, floaters/spots, BP normal.    Had questions about her weight gain - feels like she's gained a lot of weight recently.   Discussed overall weight and fundal height is appropriate/more fetal growth during the last 4-6 weeks of pregnancy.  Suggested blind weights if it continues to be bothersome/a trigger to know the weight.   Fetal Movement: positive, denies loss of fluid/vb, no  contractions, denies signs and symptoms preeclampsia  Taking hospital tour?  No - watched video online  Have car seat-Yes  Discussed GBS/cx check at next visit  Fetal kick counts/movements reviewed  Reviewed signs and symptoms PTL and s/sx of preeclampsia; denies any S&S and aware of what to report  Return to clinic 2 weeks    SILVA Ulloa  [facility]    Provider Disclosure:  I was present with the NP student who participated in the service and in the documentation of the services provided. I have verified the history and personally performed the physical exam and medical decision-making, as documented by the student and edited by me.     EMMANUEL Levin, [facility]  [phone]

## 2023-07-05 ENCOUNTER — PRENATAL OFFICE VISIT (OUTPATIENT)
Dept: MIDWIFE SERVICES | Facility: CLINIC | Age: 28
End: 2023-07-05
Payer: COMMERCIAL

## 2023-07-05 VITALS — BODY MASS INDEX: 31.38 KG/M2 | WEIGHT: 182.8 LBS | DIASTOLIC BLOOD PRESSURE: 62 MMHG | SYSTOLIC BLOOD PRESSURE: 112 MMHG

## 2023-07-05 DIAGNOSIS — Z34.03 ENCOUNTER FOR SUPERVISION OF NORMAL FIRST PREGNANCY IN THIRD TRIMESTER: Primary | ICD-10-CM

## 2023-07-05 PROCEDURE — 99207 PR PRENATAL VISIT: CPT | Performed by: ADVANCED PRACTICE MIDWIFE

## 2023-07-05 NOTE — PATIENT INSTRUCTIONS
The Benefits of Breastmilk  Breastmilk is the best food for your baby. It has just the right amount of nutrients. It protects your baby's digestive system. It protects other body systems in your baby, and it helps your baby grow and develop.       Healthiest for baby  Breastmilk is the ideal food for babies. It has all the nutrients your baby needs to grow healthy and strong.    Breastmilk has these benefits:    It lowers the risk for sudden infant death syndrome (SIDS).    It gives babies a lower risk for ear infections in their first year. This is compared to babies who are fed formula.    It has DHA. This is a type of fat. It helps your baby s growing brain, nervous system, and eyes.    It is full of antibodies. These help your baby fight infection.    It lowers your baby's risk for lung illness.    It lowers the risk of diarrhea.    It lowers your baby s risk for allergies. Babies fed formula are more likely to have an allergy to cow's milk.    It lowers your baby s risk for colds and many other diseases.    It changes as your baby grows. This meets your baby's changing needs.  And it s important to know that:    Giving only breastmilk for the first 6 months gives your baby more of these benefits.    Giving breastmilk plus solid food from 6 months to 1 year or more gives more benefits.     babies have fewer long-term health problems when they grow up. These problems include diabetes and obesity.    Breastfeeding gives contact that your baby loves. Spending time skin-to-skin with you is calming and comforting.  Healthiest for mom  For many people, breastfeeding is a good experience. It creates a strong bond between mother and baby. People who breastfeed also get health benefits. Some benefits for you include:     You can know that your baby is growing healthy and strong because of your milk.    Breastmilk is convenient. It's free and clean. It's always at the right temperature.    Breastfeeding burns  calories. This can help you lose pregnancy weight faster.    Breastfeeding releases hormones that contract the uterus. This helps the uterus return to its normal size after childbirth.    Mothers who breastfeed have a lower risk for ovarian and breast cancers.    Some studies have found that breastfeeding may reduce a person's risk for type 2 diabetes and rheumatoid arthritis. It may reduce the risk of cardiovascular disease. This includes high blood pressure and high cholesterol.    Breastfeeding every day delays the return of your menstrual period. This can help extend the time between pregnancies.  Many people can help you learn to breastfeed. A lactation consultant can help. This is a healthcare provider who is trained to help you breastfeed. Your nurse, midwife, nurse practitioner, obstetrician, pediatrician, or family practice healthcare provider can also help you learn about breastfeeding.   What does it mean to give only breastmilk?   Giving only breastmilk for at least the first 6 months of life is best for your baby. If you need to be away from your baby, you can express breastmilk. This means pumping milk from your breast into a container. Talk with your healthcare provider about the best ways to feed this milk to your baby.    You should not give your baby water, sugar water, formula, or solids during their first 6 months unless your baby's healthcare provider tells you to.   Your baby s provider may tell you to give your baby vitamins, minerals, or medicines.  babies should be given vitamin D supplements. The provider will tell you the type and amount of vitamin D to give your baby.   What are the risks of not giving only breastmilk?   You now know the many of the benefits of breastfeeding. But you might not know why it's important to give only breastmilk for at least 6 months.   Your baby gets the best protection against health problems when they get only breastmilk. Breastfeeding some of the  time is good. But breastfeeding all of the time is best.   Giving your baby formula or other liquids may cause you to:    Have more problems breastfeeding    Make less milk    Be less confident in breastfeeding    Breastfeed less often    Stop breastfeeding before your baby is at least 12 months old                                                     When other choices may be needed  Giving only breastmilk is almost always the best thing to do. But your healthcare provider may have reasons to advise giving your baby formula or other liquids. They include:     Your baby has a health problem. There are cases where you may need to add formula or other liquids. This is often only for a short time. This may be the case if your baby has low blood sugar (hypoglycemia), loses body fluids (dehydration), or has high levels of bilirubin.    You have certain health problems. Some infections can be passed from your skin to your baby's skin. Or it can pass through your breastmilk. People with HIV/AIDS or untreated and contagious TB (tuberculosis) should not breastfeed. Women with active skin sores from chickenpox (varicella) can pump their breastmilk and feed their baby. But they should keep their baby s skin from touching any of the sores.    You use illegal drugs or drink alcohol. People who use illegal drugs should not breastfeed. If you are going to have a drink that has alcohol, it's best to do so just after you nurse or pump milk. Breastfeeding or pumping breast milk is OK at least 4 hours after your last drink. That way, your body will have some time to get rid of the alcohol before the next feeding and less of it will reach your baby. Long-term exposure to alcohol in breastmilk may affect your baby's health. It may also cause you to make less milk.    You take certain medicines. If you take any medicines, ask your baby s healthcare provider if you can breastfeed.  Doni last reviewed this educational content on  2022 [employer]. All rights reserved. This information is not intended as a substitute for professional medical care. Always follow your healthcare professional's instructions.          What Is Group B Strep?  Group B strep (streptococcus) is a common type of bacteria. It can grow in the vagina, rectum, or urinary tract. It most often does not cause harm in adults. But in rare cases, a pregnant person who has group B strep can infect the baby during birth. This can cause serious illness in the . But treatment during labor reduces the risk of the baby becoming infected. And if a  gets group B strep, the infection can be treated.     Facts about group B strep   Learning more about group B strep can help you understand how testing and treatment can help. Here are some basic facts about group B strep:     It's not a sexually transmitted infection.    It's not the same as strep throat. (That is caused by group A strep.)    It often has no symptoms. It may cause no problems in adults.    Test results can be misleading. They may be negative one week and positive the next week.    Group B strep can be spread to the baby during vaginal delivery. It can't be passed during  (surgical) birth.    A person with group B strep rarely infects the . (Infection happens only about 1% to 2% of the time.)    When a person is treated during labor and delivery, the baby almost never becomes infected.    Certain factors during pregnancy increase the risk of a baby becoming infected.  Possible effects on your baby   Group B strep can infect the blood. It can also cause inflammation of the baby s lungs, brain, or spinal cord. Long-term effects can include blindness, deafness, mental retardation, or cerebral palsy. And in rare cases, infection causes death. Infection is most often found soon after the baby is born.   How your baby may become infected   Group B strep often lives in  the vagina or rectum. If the amniotic sac breaks early, bacteria from the vagina can travel to the uterus, reaching the baby. Or, while passing through the birth canal, the baby can come in contact with the bacteria. In rare cases, group B strep can be passed to the baby after delivery. This is called late-onset group B strep. The source of this type of infection is not well-understood. But some experts believe that it happens if the baby is exposed to group B strep in the home, from the parents or siblings, or in the community.   What increases the risk?   Certain things increase the chance that a baby will be infected. They include:    Breaking or leaking of the amniotic sac before 37 weeks of pregnancy    Labor before 37 weeks of pregnancy    Breaking of the amniotic sac more than 18 hours before labor starts    Fever during labor    A urinary tract infection with group B strep at any point in the pregnancy    Having a previous baby born with a group B strep infection  Doni last reviewed this educational content on 6/1/2022 2000-2023 [employer]. All rights reserved. This information is not intended as a substitute for professional medical care. Always follow your healthcare professional's instructions.

## 2023-07-06 ENCOUNTER — MYC MEDICAL ADVICE (OUTPATIENT)
Dept: MIDWIFE SERVICES | Facility: CLINIC | Age: 28
End: 2023-07-06
Payer: COMMERCIAL

## 2023-07-07 NOTE — TELEPHONE ENCOUNTER
Message sent to patient with comfort measures, discussed it could be abdominal muscle separation, if it persists, worsens or does not resolve or other concerns develop recommend evaluation in [facility].    EMMANUEL Levin, CNM

## 2023-07-13 ENCOUNTER — MEDICAL CORRESPONDENCE (OUTPATIENT)
Dept: HEALTH INFORMATION MANAGEMENT | Facility: CLINIC | Age: 28
End: 2023-07-13
Payer: COMMERCIAL

## 2023-07-17 PROBLEM — Z34.03 ENCOUNTER FOR SUPERVISION OF NORMAL FIRST PREGNANCY IN THIRD TRIMESTER: Status: ACTIVE | Noted: 2022-12-26

## 2023-07-17 NOTE — PATIENT INSTRUCTIONS
"Labor Instructions for Midwife Patients    When to call:  Both during and after office hours call  [phone]. There is a triage RN to take your calls and answer your questions 24 hours a day.  If they cannot answer your question they will page the midwife on call for you.    When to call:  Call anytime you have important concerns about you or your baby.     Call if:  You are having contractions at regular intervals about 5-6 minutes apart lasting 60 seconds and becoming increasingly more intense   You have an uncontrollable gush of fluid from your vagina or feel a pop and gush like your water has broken  You have HEAVY bleeding, like heavy period, blood running down your legs, or  soaking a pad.   Some bleeding after a pelvic exam, after intercourse, or in labor when your cervix is dilating is normal and is referred to as \"bloody show\"  You have severe, continuous back or abdominal pain  You feel it is time to go to the hospital  If this is your first labor, call when contractions are very intense and have been about every 3-4 minutes for about an hour  If it is your second labor or more, call when contractions are strong and about every 3-5 minutes or sooner depending on your level of discomfort.     Keep in mind we are always here for you! If you have questions, concerns please don't hesitate to call us.     What to eat/drink in labor: Drink plenty of fluid (water most importantly, juice, soda or tea without caffeine). Eat rice, pasta, soup, cereal, bread/toast, and fruit. Avoid dairy and greasy food as they are difficult to digest and you may experience some nausea during labor.    Comfort measures:  Baths and showers (ok even with ruptured membranes, it may temporarily slow contractions if you are still in the early stage of labor)  Warm/hot packs for back pain or discomfort  Back, belly, or thigh massages  Standing, rocking, walking, leaning over bed or tables, side-lying and sleeping    Miscellaneous: "   Contractions are timed from the beginning of one to the beginning of the next  Try hard to sleep during the early stage of labor when you are not that uncomfortable. Timing of contractions at this point is not important  Even if you cannot sleep, resting in bed or on the couch can help you maintain your energy for labor  When you arrive at the hospital the nurse will check your baby's heartbeat, check your cervix, and will call us. The midwife on call will come in and be with you when you are in active labor  After hours you need to enter the hospital through the emergency room

## 2023-07-17 NOTE — PROGRESS NOTES
36w2d  Feels well overall. Here with Juan today.   Fetal movement: positive  Denies bleeding/lof, no contractions, denies signs and symptoms preeclampsia  GBS swab done today  Confirmed vertex via bedside US    Labor preferences/birth plan reviewed. Hopeful to be as natural as possible. Willing to go with the flow. Hoping for unmedicated and hands and knees delivery.   CBC drawn today.     Labor instructions given  Reviewed fetal kick/movement counts  Warning signs and signs and symptoms preeclampsia reviewed    Return to clinic 1 week    Maryam Hernandez, ONEYDA, APRN, CNM

## 2023-07-18 ENCOUNTER — PRENATAL OFFICE VISIT (OUTPATIENT)
Dept: MIDWIFE SERVICES | Facility: CLINIC | Age: 28
End: 2023-07-18
Payer: COMMERCIAL

## 2023-07-18 VITALS — DIASTOLIC BLOOD PRESSURE: 60 MMHG | BODY MASS INDEX: 31.24 KG/M2 | SYSTOLIC BLOOD PRESSURE: 110 MMHG | WEIGHT: 182 LBS

## 2023-07-18 DIAGNOSIS — Z13.0 SCREENING, ANEMIA, DEFICIENCY, IRON: ICD-10-CM

## 2023-07-18 DIAGNOSIS — Z36.85 SCREENING, ANTENATAL, FOR STREPTOCOCCUS B: ICD-10-CM

## 2023-07-18 DIAGNOSIS — Z34.03 ENCOUNTER FOR SUPERVISION OF NORMAL FIRST PREGNANCY IN THIRD TRIMESTER: Primary | ICD-10-CM

## 2023-07-18 LAB
ERYTHROCYTE [DISTWIDTH] IN BLOOD BY AUTOMATED COUNT: 13.4 % (ref 10–15)
HCT VFR BLD AUTO: 36.5 % (ref 35–47)
HGB BLD-MCNC: 12 G/DL (ref 11.7–15.7)
MCH RBC QN AUTO: 30.1 PG (ref 26.5–33)
MCHC RBC AUTO-ENTMCNC: 32.9 G/DL (ref 31.5–36.5)
MCV RBC AUTO: 92 FL (ref 78–100)
PLATELET # BLD AUTO: 270 10E3/UL (ref 150–450)
RBC # BLD AUTO: 3.99 10E6/UL (ref 3.8–5.2)
WBC # BLD AUTO: 17.6 10E3/UL (ref 4–11)

## 2023-07-18 PROCEDURE — 99207 PR PRENATAL VISIT: CPT | Performed by: ADVANCED PRACTICE MIDWIFE

## 2023-07-18 PROCEDURE — 85027 COMPLETE CBC AUTOMATED: CPT | Performed by: ADVANCED PRACTICE MIDWIFE

## 2023-07-18 PROCEDURE — 36415 COLL VENOUS BLD VENIPUNCTURE: CPT | Performed by: ADVANCED PRACTICE MIDWIFE

## 2023-07-18 PROCEDURE — 87653 STREP B DNA AMP PROBE: CPT | Performed by: ADVANCED PRACTICE MIDWIFE

## 2023-07-19 LAB — GP B STREP DNA SPEC QL NAA+PROBE: NEGATIVE

## 2023-07-24 ENCOUNTER — PRENATAL OFFICE VISIT (OUTPATIENT)
Dept: MIDWIFE SERVICES | Facility: CLINIC | Age: 28
End: 2023-07-24
Payer: COMMERCIAL

## 2023-07-24 VITALS — WEIGHT: 184.6 LBS | DIASTOLIC BLOOD PRESSURE: 62 MMHG | BODY MASS INDEX: 31.69 KG/M2 | SYSTOLIC BLOOD PRESSURE: 122 MMHG

## 2023-07-24 DIAGNOSIS — Z34.03 ENCOUNTER FOR SUPERVISION OF NORMAL FIRST PREGNANCY IN THIRD TRIMESTER: Primary | ICD-10-CM

## 2023-07-24 PROCEDURE — 99207 PR PRENATAL VISIT: CPT | Performed by: NURSE PRACTITIONER

## 2023-07-24 NOTE — PROGRESS NOTES
37w1d  Feels overall pretty good, having some intermittent soreness on upper mid abd, especially after working, wondering if it is due to diastasis.   Fetal movement: positive  Denies vaginal bleeding/lof, no contractions, denies signs and symptoms preeclampsia    Discussed MyChart check-in, 2-week and 6-week postpartum visits  Plans for birth control after baby: unsure, has used Nexplanon in the past x 3, reports 3rd had to be surgically removed.  May consider again, but is open to discussing PP.     Fetal kick/movement counts reviewed  Labor signs and symptoms discussed, aware of numbers to call  Discussed warning signs, signs and symptoms preeclampsia    Return to clinic 1 week    Laurie BENITEZ CNM, [facility]  [phone]

## 2023-07-30 NOTE — PROGRESS NOTES
38w0d  Feels well, here with Juan, had some contractions Thursday and had to leave work but then they went away. She wants to do a check today, doing some natural labor remedies all except red raspberry leaf tea, hoping to not have to go back to work this weekend  Fetal movement: positive  Denies vaginal bleeding/lof, some contractions, denies signs and symptoms preeclampsia  Fetal kick/movement counts reviewed  Labor signs and symptoms discussed, aware of numbers to call  Discussed warning signs, signs and symptoms preeclampsia  SvE: FT/50%/-2, soft, posterior  Holliday Score:  Dilation of Cervix:  0        Score = 0  Effacement:  40-50%;   Score = 1  Consistency:  Soft;   Score = 2  Position:  Posterior;   Score = 0  Station:  -2;   Score = 1  Total Holliday Score:  3  Discussed postdates induction of labor after 41, elective induction of labor 39-41 if cervix is ripe  Return to clinic 1 week    EMMANUEL Levin, CNM

## 2023-08-01 ENCOUNTER — PRENATAL OFFICE VISIT (OUTPATIENT)
Dept: MIDWIFE SERVICES | Facility: CLINIC | Age: 28
End: 2023-08-01
Payer: COMMERCIAL

## 2023-08-01 VITALS — WEIGHT: 184 LBS | SYSTOLIC BLOOD PRESSURE: 118 MMHG | DIASTOLIC BLOOD PRESSURE: 60 MMHG | BODY MASS INDEX: 31.58 KG/M2

## 2023-08-01 DIAGNOSIS — Z34.03 ENCOUNTER FOR SUPERVISION OF NORMAL FIRST PREGNANCY IN THIRD TRIMESTER: Primary | ICD-10-CM

## 2023-08-01 PROCEDURE — 99207 PR PRENATAL VISIT: CPT | Performed by: ADVANCED PRACTICE MIDWIFE

## 2023-08-04 NOTE — PROGRESS NOTES
39w1d  Feels well, here with Juan, worked all weekend, ready to be done, doing all the natural things to prepare. Open to elective induction of labor if meets criteria.   Fetal movement: positive  Denies vaginal bleeding/lof, some contractions.cramping but nothing consistent or timeable, denies signs and symptoms preeclampsia  Fetal kick/movement counts reviewed  Labor signs and symptoms discussed, aware of numbers to call  Discussed warning signs, signs and symptoms preeclampsia  SVE 1/50/-2, medium/posterior  Holliday Score:  Dilation of Cervix:  1-2     Score = 1  Effacement:  40-50%;   Score = 1  Consistency:  Average;   Score = 1  Position:  Posterior;   Score = 0  Station:  -2;   Score = 1  Total Holliday Score:  4  Plan for repeat visit and membrane sweep again later this week  Recommend she schedule visit next week with BPP  Return to clinic 1 week    EMMANUEL Levin, COLLEEN

## 2023-08-07 ENCOUNTER — PRENATAL OFFICE VISIT (OUTPATIENT)
Dept: MIDWIFE SERVICES | Facility: CLINIC | Age: 28
End: 2023-08-07
Payer: COMMERCIAL

## 2023-08-07 VITALS
SYSTOLIC BLOOD PRESSURE: 120 MMHG | WEIGHT: 186 LBS | BODY MASS INDEX: 31.76 KG/M2 | DIASTOLIC BLOOD PRESSURE: 68 MMHG | HEIGHT: 64 IN

## 2023-08-07 DIAGNOSIS — Z34.03 ENCOUNTER FOR SUPERVISION OF NORMAL FIRST PREGNANCY IN THIRD TRIMESTER: Primary | ICD-10-CM

## 2023-08-07 PROCEDURE — 99207 PR PRENATAL VISIT: CPT | Performed by: ADVANCED PRACTICE MIDWIFE

## 2023-08-09 NOTE — PATIENT INSTRUCTIONS
"Labor Instructions for Midwife Patients    When to call:  Both during and after office hours call  [phone]. There is a triage RN to take your calls and answer your questions 24 hours a day.  If they cannot answer your question they will page the midwife on call for you.    When to call:  Call anytime you have important concerns about you or your baby.     Call if:  You are having contractions at regular intervals about 5-6 minutes apart lasting 60 seconds and becoming increasingly more intense   You have an uncontrollable gush of fluid from your vagina or feel a pop and gush like your water has broken  You have HEAVY bleeding, like heavy period, blood running down your legs, or  soaking a pad.   Some bleeding after a pelvic exam, after intercourse, or in labor when your cervix is dilating is normal and is referred to as \"bloody show\"  You have severe, continuous back or abdominal pain  You feel it is time to go to the hospital  If this is your first labor, call when contractions are very intense and have been about every 3-4 minutes for about an hour  If it is your second labor or more, call when contractions are strong and about every 3-5 minutes or sooner depending on your level of discomfort.     Keep in mind we are always here for you! If you have questions, concerns please don't hesitate to call us.     What to eat/drink in labor: Drink plenty of fluid (water most importantly, juice, soda or tea without caffeine). Eat rice, pasta, soup, cereal, bread/toast, and fruit. Avoid dairy and greasy food as they are difficult to digest and you may experience some nausea during labor.    Comfort measures:  Baths and showers (ok even with ruptured membranes, it may temporarily slow contractions if you are still in the early stage of labor)  Warm/hot packs for back pain or discomfort  Back, belly, or thigh massages  Standing, rocking, walking, leaning over bed or tables, side-lying and sleeping    Miscellaneous: " "  Contractions are timed from the beginning of one to the beginning of the next  Try hard to sleep during the early stage of labor when you are not that uncomfortable. Timing of contractions at this point is not important  Even if you cannot sleep, resting in bed or on the couch can help you maintain your energy for labor  When you arrive at the hospital the nurse will check your baby's heartbeat, check your cervix, and will call us. The midwife on call will come in and be with you when you are in active labor  From 7am to 11pm, you can park in the East ramp and enter the hospital through Door [address]  After hours you need to enter the hospital through the emergency room    PREECLAMPSIA SIGNS AND SYMPTOMS    Preeclampsia is a dangerous condition that some women develop in the second half of pregnancy. It can also begin after the baby is born.  Preeclampsia causes high blood pressure and can cause problems with many organ systems in your body.  It can also affect the growth of your baby. The exact cause of preeclampsia is unknown, however, there are signs and symptoms to watch for:    -A bad headache that doesn't improve with Tylenol  -Visual changes such as spots, flashes of light, blurry vision  -Pain in the upper right part of your abdomen, especially under the ribs that doesn't go away  -Nausea and/or vomiting  -Feeling extremely tired  -Yellowing of the skin and/or eyes  -Feeling \"not quite right\" or that something is wrong  -An extreme amount of swelling (some swelling in pregnancy is very normal)    If your midwife feels that you are developing preeclampsia, you will have lab tests drawn and will be monitored very closely.     If you are experiencing anyof these symptoms, call the [facility] immediately at [phone].    Fetal Kick Counts    It is important to know when your baby's movements occur. We often get busy with work and life and do not pay close attention to their movements.    "   Women typically begin feeling movement between 18-22 weeks of gestation, sometimes it can be earlier or later depending on where your placenta is     Movements usually begin feeling like popping or fluttering and as the baby grows they become more pronounced    Toward the end of pregnancy as the baby gets larger they may not move as much or make as big of movements. Babies have maturing sleep cycles as well as not as much room to move and flip. If you are ever concerned about your baby's movements or have not felt the baby move for a while, we recommend you do a fetal kick count. Prior to starting your count drink a glass of water or juice and eat a snack. Then lay down on your side and begin to count movements.     How to do a Fetal Kick Counts    There are many different ways to monitor your baby's movements. Movements can range from large jabs to small kicks, or wiggles.  Hiccups count!    Count 10 movements in 2 hours when resting and focusing  Count 10 movements in 12 hours when doing normal activity    We recommend that if movements occur but seem decreased that you should be seen in the clinic or hospital for evaluation within 12 hours. If fetal movement is absent or fetal kick counts are low please contact us right away.    If you ever have any concerns about your baby's movements DO NOT HESITATE to call us, we are here for you!    [facility]  [phone]

## 2023-08-09 NOTE — PROGRESS NOTES
39w5d  Here for membrane sweep. Feeling well but anxious for baby's arrival! Has been crampy all week after membrane sweep on Monday but noting regular or painful   Fetal movement: positive  Denies vaginal bleeding/lof, some contractions, denies signs and symptoms preeclampsia  Cervix: 1.5cm/40%/-2/posterior/soft, membrane sweep done   Fetal kick/movement counts reviewed  Labor signs and symptoms discussed, aware of numbers to call  Discussed warning signs, signs and symptoms preeclampsia    Return to clinic Monday for BPP and routine appt Tuesday    Elisa Cha, EMMANUEL, CNM

## 2023-08-11 ENCOUNTER — PRENATAL OFFICE VISIT (OUTPATIENT)
Dept: MIDWIFE SERVICES | Facility: CLINIC | Age: 28
End: 2023-08-11
Payer: COMMERCIAL

## 2023-08-11 VITALS — DIASTOLIC BLOOD PRESSURE: 68 MMHG | BODY MASS INDEX: 32.1 KG/M2 | WEIGHT: 187 LBS | SYSTOLIC BLOOD PRESSURE: 118 MMHG

## 2023-08-11 DIAGNOSIS — Z34.03 ENCOUNTER FOR SUPERVISION OF NORMAL FIRST PREGNANCY IN THIRD TRIMESTER: Primary | ICD-10-CM

## 2023-08-11 PROCEDURE — 99207 PR PRENATAL VISIT: CPT | Performed by: ADVANCED PRACTICE MIDWIFE

## 2023-08-11 PROCEDURE — 59426 ANTEPARTUM CARE ONLY: CPT | Performed by: ADVANCED PRACTICE MIDWIFE

## 2023-08-12 ENCOUNTER — ANESTHESIA EVENT (OUTPATIENT)
Dept: OBGYN | Facility: CLINIC | Age: 28
End: 2023-08-12
Payer: COMMERCIAL

## 2023-08-12 ENCOUNTER — NURSE TRIAGE (OUTPATIENT)
Dept: NURSING | Facility: CLINIC | Age: 28
End: 2023-08-12
Payer: COMMERCIAL

## 2023-08-12 ENCOUNTER — HOSPITAL ENCOUNTER (INPATIENT)
Facility: CLINIC | Age: 28
LOS: 2 days | Discharge: HOME | End: 2023-08-14
Attending: ADVANCED PRACTICE MIDWIFE | Admitting: ADVANCED PRACTICE MIDWIFE
Payer: COMMERCIAL

## 2023-08-12 ENCOUNTER — TELEPHONE (OUTPATIENT)
Dept: MIDWIFE SERVICES | Facility: CLINIC | Age: 28
End: 2023-08-12
Payer: COMMERCIAL

## 2023-08-12 ENCOUNTER — ANESTHESIA (OUTPATIENT)
Dept: OBGYN | Facility: CLINIC | Age: 28
End: 2023-08-12
Payer: COMMERCIAL

## 2023-08-12 DIAGNOSIS — K59.00 CONSTIPATION, UNSPECIFIED CONSTIPATION TYPE: ICD-10-CM

## 2023-08-12 DIAGNOSIS — R11.0 NAUSEA: ICD-10-CM

## 2023-08-12 PROBLEM — Z36.89 ENCOUNTER FOR TRIAGE IN PREGNANT PATIENT: Status: ACTIVE | Noted: 2023-08-12

## 2023-08-12 LAB
ABO/RH(D): NORMAL
ANTIBODY SCREEN: NEGATIVE
ERYTHROCYTE [DISTWIDTH] IN BLOOD BY AUTOMATED COUNT: 13.5 % (ref 10–15)
HCT VFR BLD AUTO: 38.6 % (ref 35–47)
HGB BLD-MCNC: 12.5 G/DL (ref 11.7–15.7)
MCH RBC QN AUTO: 28.9 PG (ref 26.5–33)
MCHC RBC AUTO-ENTMCNC: 32.4 G/DL (ref 31.5–36.5)
MCV RBC AUTO: 89 FL (ref 78–100)
PLATELET # BLD AUTO: 296 10E3/UL (ref 150–450)
RBC # BLD AUTO: 4.33 10E6/UL (ref 3.8–5.2)
SPECIMEN EXPIRATION DATE: NORMAL
T PALLIDUM AB SER QL: NONREACTIVE
WBC # BLD AUTO: 21.8 10E3/UL (ref 4–11)

## 2023-08-12 PROCEDURE — 59410 OBSTETRICAL CARE: CPT | Performed by: ADVANCED PRACTICE MIDWIFE

## 2023-08-12 PROCEDURE — G0463 HOSPITAL OUTPT CLINIC VISIT: HCPCS

## 2023-08-12 PROCEDURE — 86780 TREPONEMA PALLIDUM: CPT | Performed by: ADVANCED PRACTICE MIDWIFE

## 2023-08-12 PROCEDURE — 370N000003 HC ANESTHESIA WARD SERVICE: Performed by: ANESTHESIOLOGY

## 2023-08-12 PROCEDURE — 00HU33Z INSERTION OF INFUSION DEVICE INTO SPINAL CANAL, PERCUTANEOUS APPROACH: ICD-10-PCS | Performed by: ADVANCED PRACTICE MIDWIFE

## 2023-08-12 PROCEDURE — 120N000012 HC R&B POSTPARTUM

## 2023-08-12 PROCEDURE — 258N000003 HC RX IP 258 OP 636: Performed by: ADVANCED PRACTICE MIDWIFE

## 2023-08-12 PROCEDURE — 250N000009 HC RX 250: Performed by: ANESTHESIOLOGY

## 2023-08-12 PROCEDURE — 86850 RBC ANTIBODY SCREEN: CPT | Performed by: ADVANCED PRACTICE MIDWIFE

## 2023-08-12 PROCEDURE — 36415 COLL VENOUS BLD VENIPUNCTURE: CPT | Performed by: ADVANCED PRACTICE MIDWIFE

## 2023-08-12 PROCEDURE — 3E0R3BZ INTRODUCTION OF ANESTHETIC AGENT INTO SPINAL CANAL, PERCUTANEOUS APPROACH: ICD-10-PCS | Performed by: ADVANCED PRACTICE MIDWIFE

## 2023-08-12 PROCEDURE — 250N000011 HC RX IP 250 OP 636: Performed by: ANESTHESIOLOGY

## 2023-08-12 PROCEDURE — 250N000013 HC RX MED GY IP 250 OP 250 PS 637: Performed by: ADVANCED PRACTICE MIDWIFE

## 2023-08-12 PROCEDURE — 86901 BLOOD TYPING SEROLOGIC RH(D): CPT | Performed by: ADVANCED PRACTICE MIDWIFE

## 2023-08-12 PROCEDURE — 722N000001 HC LABOR CARE VAGINAL DELIVERY SINGLE

## 2023-08-12 PROCEDURE — 85027 COMPLETE CBC AUTOMATED: CPT | Performed by: ADVANCED PRACTICE MIDWIFE

## 2023-08-12 PROCEDURE — 0UQGXZZ REPAIR VAGINA, EXTERNAL APPROACH: ICD-10-PCS | Performed by: ADVANCED PRACTICE MIDWIFE

## 2023-08-12 PROCEDURE — 250N000009 HC RX 250: Performed by: ADVANCED PRACTICE MIDWIFE

## 2023-08-12 RX ORDER — MODIFIED LANOLIN
OINTMENT (GRAM) TOPICAL
Status: DISCONTINUED | OUTPATIENT
Start: 2023-08-12 | End: 2023-08-14 | Stop reason: HOSPADM

## 2023-08-12 RX ORDER — ACETAMINOPHEN 325 MG/1
650 TABLET ORAL EVERY 4 HOURS PRN
Status: DISCONTINUED | OUTPATIENT
Start: 2023-08-12 | End: 2023-08-14 | Stop reason: HOSPADM

## 2023-08-12 RX ORDER — NALOXONE HYDROCHLORIDE 0.4 MG/ML
0.4 INJECTION, SOLUTION INTRAMUSCULAR; INTRAVENOUS; SUBCUTANEOUS
Status: DISCONTINUED | OUTPATIENT
Start: 2023-08-12 | End: 2023-08-12 | Stop reason: HOSPADM

## 2023-08-12 RX ORDER — KETOROLAC TROMETHAMINE 30 MG/ML
30 INJECTION, SOLUTION INTRAMUSCULAR; INTRAVENOUS
Status: DISCONTINUED | OUTPATIENT
Start: 2023-08-12 | End: 2023-08-13

## 2023-08-12 RX ORDER — ONDANSETRON 4 MG/1
4 TABLET, ORALLY DISINTEGRATING ORAL EVERY 6 HOURS PRN
Status: DISCONTINUED | OUTPATIENT
Start: 2023-08-12 | End: 2023-08-12 | Stop reason: HOSPADM

## 2023-08-12 RX ORDER — TRANEXAMIC ACID 10 MG/ML
1 INJECTION, SOLUTION INTRAVENOUS EVERY 30 MIN PRN
Status: DISCONTINUED | OUTPATIENT
Start: 2023-08-12 | End: 2023-08-12 | Stop reason: HOSPADM

## 2023-08-12 RX ORDER — METOCLOPRAMIDE 10 MG/1
10 TABLET ORAL EVERY 6 HOURS PRN
Status: DISCONTINUED | OUTPATIENT
Start: 2023-08-12 | End: 2023-08-12 | Stop reason: HOSPADM

## 2023-08-12 RX ORDER — OXYTOCIN/0.9 % SODIUM CHLORIDE 30/500 ML
340 PLASTIC BAG, INJECTION (ML) INTRAVENOUS CONTINUOUS PRN
Status: DISCONTINUED | OUTPATIENT
Start: 2023-08-12 | End: 2023-08-14 | Stop reason: HOSPADM

## 2023-08-12 RX ORDER — TRANEXAMIC ACID 10 MG/ML
1 INJECTION, SOLUTION INTRAVENOUS EVERY 30 MIN PRN
Status: DISCONTINUED | OUTPATIENT
Start: 2023-08-12 | End: 2023-08-14 | Stop reason: HOSPADM

## 2023-08-12 RX ORDER — CITRIC ACID/SODIUM CITRATE 334-500MG
30 SOLUTION, ORAL ORAL
Status: DISCONTINUED | OUTPATIENT
Start: 2023-08-12 | End: 2023-08-12 | Stop reason: HOSPADM

## 2023-08-12 RX ORDER — MISOPROSTOL 200 UG/1
800 TABLET ORAL
Status: DISCONTINUED | OUTPATIENT
Start: 2023-08-12 | End: 2023-08-14 | Stop reason: HOSPADM

## 2023-08-12 RX ORDER — IBUPROFEN 400 MG/1
800 TABLET, FILM COATED ORAL EVERY 6 HOURS PRN
Status: DISCONTINUED | OUTPATIENT
Start: 2023-08-12 | End: 2023-08-14 | Stop reason: HOSPADM

## 2023-08-12 RX ORDER — MISOPROSTOL 200 UG/1
800 TABLET ORAL
Status: DISCONTINUED | OUTPATIENT
Start: 2023-08-12 | End: 2023-08-12 | Stop reason: HOSPADM

## 2023-08-12 RX ORDER — CALCIUM CARBONATE 500 MG/1
1000 TABLET, CHEWABLE ORAL DAILY PRN
Status: DISCONTINUED | OUTPATIENT
Start: 2023-08-12 | End: 2023-08-14 | Stop reason: HOSPADM

## 2023-08-12 RX ORDER — DOCUSATE SODIUM 100 MG/1
100 CAPSULE, LIQUID FILLED ORAL DAILY
Status: DISCONTINUED | OUTPATIENT
Start: 2023-08-12 | End: 2023-08-14 | Stop reason: HOSPADM

## 2023-08-12 RX ORDER — IBUPROFEN 400 MG/1
800 TABLET, FILM COATED ORAL
Status: DISCONTINUED | OUTPATIENT
Start: 2023-08-12 | End: 2023-08-12

## 2023-08-12 RX ORDER — OXYTOCIN 10 [USP'U]/ML
10 INJECTION, SOLUTION INTRAMUSCULAR; INTRAVENOUS
Status: DISCONTINUED | OUTPATIENT
Start: 2023-08-12 | End: 2023-08-14 | Stop reason: HOSPADM

## 2023-08-12 RX ORDER — METOCLOPRAMIDE HYDROCHLORIDE 5 MG/ML
10 INJECTION INTRAMUSCULAR; INTRAVENOUS EVERY 6 HOURS PRN
Status: DISCONTINUED | OUTPATIENT
Start: 2023-08-12 | End: 2023-08-12 | Stop reason: HOSPADM

## 2023-08-12 RX ORDER — CARBOPROST TROMETHAMINE 250 UG/ML
250 INJECTION, SOLUTION INTRAMUSCULAR
Status: DISCONTINUED | OUTPATIENT
Start: 2023-08-12 | End: 2023-08-14 | Stop reason: HOSPADM

## 2023-08-12 RX ORDER — CARBOPROST TROMETHAMINE 250 UG/ML
250 INJECTION, SOLUTION INTRAMUSCULAR
Status: DISCONTINUED | OUTPATIENT
Start: 2023-08-12 | End: 2023-08-12 | Stop reason: HOSPADM

## 2023-08-12 RX ORDER — HYDROCORTISONE 25 MG/G
CREAM TOPICAL 3 TIMES DAILY PRN
Status: DISCONTINUED | OUTPATIENT
Start: 2023-08-12 | End: 2023-08-14 | Stop reason: HOSPADM

## 2023-08-12 RX ORDER — OXYTOCIN/0.9 % SODIUM CHLORIDE 30/500 ML
340 PLASTIC BAG, INJECTION (ML) INTRAVENOUS CONTINUOUS PRN
Status: DISCONTINUED | OUTPATIENT
Start: 2023-08-12 | End: 2023-08-12 | Stop reason: HOSPADM

## 2023-08-12 RX ORDER — EPHEDRINE SULFATE 50 MG/ML
5 INJECTION, SOLUTION INTRAMUSCULAR; INTRAVENOUS; SUBCUTANEOUS
Status: DISCONTINUED | OUTPATIENT
Start: 2023-08-12 | End: 2023-08-12 | Stop reason: HOSPADM

## 2023-08-12 RX ORDER — ONDANSETRON 2 MG/ML
4 INJECTION INTRAMUSCULAR; INTRAVENOUS EVERY 6 HOURS PRN
Status: DISCONTINUED | OUTPATIENT
Start: 2023-08-12 | End: 2023-08-12 | Stop reason: HOSPADM

## 2023-08-12 RX ORDER — NALOXONE HYDROCHLORIDE 0.4 MG/ML
0.2 INJECTION, SOLUTION INTRAMUSCULAR; INTRAVENOUS; SUBCUTANEOUS
Status: DISCONTINUED | OUTPATIENT
Start: 2023-08-12 | End: 2023-08-12 | Stop reason: HOSPADM

## 2023-08-12 RX ORDER — MISOPROSTOL 200 UG/1
400 TABLET ORAL
Status: DISCONTINUED | OUTPATIENT
Start: 2023-08-12 | End: 2023-08-14 | Stop reason: HOSPADM

## 2023-08-12 RX ORDER — METHYLERGONOVINE MALEATE 0.2 MG/ML
200 INJECTION INTRAVENOUS
Status: DISCONTINUED | OUTPATIENT
Start: 2023-08-12 | End: 2023-08-14 | Stop reason: HOSPADM

## 2023-08-12 RX ORDER — MISOPROSTOL 200 UG/1
400 TABLET ORAL
Status: DISCONTINUED | OUTPATIENT
Start: 2023-08-12 | End: 2023-08-12 | Stop reason: HOSPADM

## 2023-08-12 RX ORDER — METHYLERGONOVINE MALEATE 0.2 MG/ML
200 INJECTION INTRAVENOUS
Status: DISCONTINUED | OUTPATIENT
Start: 2023-08-12 | End: 2023-08-12 | Stop reason: HOSPADM

## 2023-08-12 RX ORDER — FAMOTIDINE 20 MG/1
20 TABLET, FILM COATED ORAL 2 TIMES DAILY PRN
Status: DISCONTINUED | OUTPATIENT
Start: 2023-08-12 | End: 2023-08-14 | Stop reason: HOSPADM

## 2023-08-12 RX ORDER — OXYTOCIN/0.9 % SODIUM CHLORIDE 30/500 ML
100-340 PLASTIC BAG, INJECTION (ML) INTRAVENOUS CONTINUOUS PRN
Status: DISCONTINUED | OUTPATIENT
Start: 2023-08-12 | End: 2023-08-13

## 2023-08-12 RX ORDER — PROCHLORPERAZINE MALEATE 5 MG
10 TABLET ORAL EVERY 6 HOURS PRN
Status: DISCONTINUED | OUTPATIENT
Start: 2023-08-12 | End: 2023-08-12 | Stop reason: HOSPADM

## 2023-08-12 RX ORDER — ACETAMINOPHEN 325 MG/1
650 TABLET ORAL EVERY 4 HOURS PRN
Status: DISCONTINUED | OUTPATIENT
Start: 2023-08-12 | End: 2023-08-12 | Stop reason: HOSPADM

## 2023-08-12 RX ORDER — LIDOCAINE 40 MG/G
CREAM TOPICAL
Status: DISCONTINUED | OUTPATIENT
Start: 2023-08-12 | End: 2023-08-12 | Stop reason: HOSPADM

## 2023-08-12 RX ORDER — OXYTOCIN 10 [USP'U]/ML
10 INJECTION, SOLUTION INTRAMUSCULAR; INTRAVENOUS
Status: DISCONTINUED | OUTPATIENT
Start: 2023-08-12 | End: 2023-08-12 | Stop reason: HOSPADM

## 2023-08-12 RX ORDER — BISACODYL 10 MG
10 SUPPOSITORY, RECTAL RECTAL DAILY PRN
Status: DISCONTINUED | OUTPATIENT
Start: 2023-08-12 | End: 2023-08-14 | Stop reason: HOSPADM

## 2023-08-12 RX ORDER — PROCHLORPERAZINE 25 MG
25 SUPPOSITORY, RECTAL RECTAL EVERY 12 HOURS PRN
Status: DISCONTINUED | OUTPATIENT
Start: 2023-08-12 | End: 2023-08-12 | Stop reason: HOSPADM

## 2023-08-12 RX ORDER — OXYTOCIN 10 [USP'U]/ML
10 INJECTION, SOLUTION INTRAMUSCULAR; INTRAVENOUS
Status: DISCONTINUED | OUTPATIENT
Start: 2023-08-12 | End: 2023-08-13

## 2023-08-12 RX ORDER — FENTANYL CITRATE 50 UG/ML
100 INJECTION, SOLUTION INTRAMUSCULAR; INTRAVENOUS
Status: DISCONTINUED | OUTPATIENT
Start: 2023-08-12 | End: 2023-08-12 | Stop reason: HOSPADM

## 2023-08-12 RX ADMIN — CALCIUM CARBONATE (ANTACID) CHEW TAB 500 MG 1000 MG: 500 CHEW TAB at 14:26

## 2023-08-12 RX ADMIN — IBUPROFEN 800 MG: 400 TABLET ORAL at 16:47

## 2023-08-12 RX ADMIN — ACETAMINOPHEN 650 MG: 325 TABLET, FILM COATED ORAL at 23:31

## 2023-08-12 RX ADMIN — Medication: at 10:43

## 2023-08-12 RX ADMIN — IBUPROFEN 800 MG: 400 TABLET ORAL at 23:31

## 2023-08-12 RX ADMIN — Medication 340 ML/HR: at 15:51

## 2023-08-12 RX ADMIN — EPHEDRINE SULFATE 5 MG: 5 INJECTION INTRAVENOUS at 13:41

## 2023-08-12 RX ADMIN — SODIUM CHLORIDE, POTASSIUM CHLORIDE, SODIUM LACTATE AND CALCIUM CHLORIDE 500 ML: 600; 310; 30; 20 INJECTION, SOLUTION INTRAVENOUS at 10:25

## 2023-08-12 ASSESSMENT — ACTIVITIES OF DAILY LIVING (ADL)
CONCENTRATING,_REMEMBERING_OR_MAKING_DECISIONS_DIFFICULTY: NO
ADLS_ACUITY_SCORE: 18
DOING_ERRANDS_INDEPENDENTLY_DIFFICULTY: NO
ADLS_ACUITY_SCORE: 18
ADLS_ACUITY_SCORE: 18
FALL_HISTORY_WITHIN_LAST_SIX_MONTHS: NO
DIFFICULTY_EATING/SWALLOWING: NO
TOILETING_ISSUES: NO
ADLS_ACUITY_SCORE: 18
WEAR_GLASSES_OR_BLIND: NO
ADLS_ACUITY_SCORE: 18
WALKING_OR_CLIMBING_STAIRS_DIFFICULTY: NO
CHANGE_IN_FUNCTIONAL_STATUS_SINCE_ONSET_OF_CURRENT_ILLNESS/INJURY: NO
DRESSING/BATHING_DIFFICULTY: NO
ADLS_ACUITY_SCORE: 18
ADLS_ACUITY_SCORE: 31
ADLS_ACUITY_SCORE: 18

## 2023-08-12 NOTE — L&D DELIVERY NOTE
OB Vaginal Delivery Note    Mary Callaway MRN# [id number]   Age: 27 year old YOB: 1995     SVE at 1510, complete/+2 and feeling increased rectal pressure. Room set up for delivery, active pushing began at 1517. Mary pushed with excellent maternal effort, NICU called to room for delivery.  at 1543. Baby placed on maternal abdomen, good color and tone but slow to cry, dried and stimulated and baby with spontaneous cry at 1 min of life. APGARs 8 an 9. Cord clamped and cut by FOB. AMTSL with IV pitocin and gentle cord traction. Placenta delivered spontaneously and intact. Genital tract inspection revealed small left vaginal side wall lac that was repaired in usual fashion with 3-0 vicryl on CT-1. QBL pending at time of note. Mother and baby in stable condition.     GA: 39w6d  GP:   Labor Complications: None   EBL:   mL  Delivery QBL:    Delivery Type: Vaginal, Spontaneous   ROM to Delivery Time: rupture date or rupture time have not been documented   Weight: 3.317 kg (7 lb 5 oz)    1 Minute 5 Minute 10 Minute   Apgar Totals: 8   9        SINDI ARRIAZA;LUIS CARLOS LAUREANO     Delivery Details:  Mary Callaway, a 27 year old  female delivered a viable infant with apgars of 8  and 9 . Patient was fully dilated and pushing after   hours   minutes in active labor. Delivery was via vaginal, spontaneous  to a sterile field under epidural  anesthesia. Infant delivered in vertex  right  occiput  anterior  position. Anterior and posterior shoulders delivered without difficulty. The cord was clamped, cut twice and 3 vessels  were noted. Cord blood was obtained in routine fashion with the following disposition: lab .      Cord complications: nuchal   Placenta delivered at 2023  3:43 PM . Placental disposition was Hospital disposal . Fundal massage performed and fundus found to be firm.     Episiotomy: none    Perineum, vagina, cervix were inspected, and the following lacerations were noted:    Perineal lacerations: none         vaginal laceration noted       Any lacerations were repaired in the usual fashion using 3-0 vicryl on CT-1.    Excellent hemostasis was noted. Needle count correct. Infant and patient in delivery room in good and stable condition.        Teodoro Callaway-Mary [[id number]]      Labor Event Times      Latent labor onset date/time: 2023 0400    Active labor onset date: 23 Onset time: 12:00 PM CDT   Dilation complete date: 23 Complete time:  3:10 PM   Start pushing date/time: 2023 1517          Labor Events     labor?: No   steroids: None  Labor Type: Spontaneous  Predominate monitoring during 1st stage: intermittent auscultation       Delivery/Placenta Date and Time      Delivery Date: 23 Delivery Time:  3:43 PM   Placenta Date/Time: 2023  3:43 PM  Oxytocin given at the time of delivery: after delivery of baby  Delivering clinician: Elisa Hadley CNM   Other personnel present at delivery:  Provider Role   Mikayla Reyes RN McGarvey, Rebecca S, RN              Vaginal Counts       Initial count performed by 2 team members:  Two Team Members   sarahi hadley cnm         Needles Suture Needles Sponges (RETIRED) Instruments   Initial counts 2 0 5    Added to count  1     Relief counts       Final counts               Placed during labor Accounted for at the end of labor   FSE     IUPC NA NA   Cervidil NA NA                  Final count performed by 2 team members:  Two Team Members   sarahi hadley cnm             Apgars    Living status: Living   1 Minute 5 Minute 10 Minute 15 Minute 20 Minute   Skin color: 0  1       Heart rate: 2  2       Reflex irritability: 2  2       Muscle tone: 2  2       Respiratory effort: 2  2       Total: 8  9       Apgars assigned by: LUIS CARLOS LAUREANO RN       Cord      Vessels: 3 Vessels    Cord Complications: Nuchal   Nuchal Intervention: delivered through         Nuchal cord  "description: loose nuchal cord         Cord Blood Disposition: Lab    Gases Sent?: No    Delayed cord clamping?: Yes    Cord Clamping Delay (seconds):  seconds    Stem cell collection?: No            Resuscitation     Care at Delivery: Requested by Elisa Hadley CNM to attend delivery due to meconium stained fluid.   Infant placed on maternal abdomen. Delayed cord clamping. Infant dried/stimulated, bulb suctioned.   Infant's color and tone improved.   No direct care provided by delivery team/NNP.   Rosio Mercado, APRN, CNP   Advanced Practice Provider  2023 at 1543 PM            [place] Measurements      Weight: 7 lb 5 oz Length: 1' 8\"     Head circumference: 34.9 cm           Skin to Skin and Feeding Plan      Skin to skin initiation date/time: 1841    Skin to skin with: Mother  Skin to skin end date/time:     Breastfeeding initiated date/time: 2023 1610       Labor Events and Shoulder Dystocia    Fetal Tracing Prior to Delivery: Category 2  Fetal Tracing Comments: variable decelerations in 2nd stage, moderate variability  Shoulder dystocia present?: Neg       Delivery (Maternal) (Provider to Complete) ([id number])    Episiotomy: None  Perineal lacerations: None      Vaginal laceration?: Yes Repaired?: Yes   Repair suture: 3-0 Vicryl  Number of repair packets: 1  Genital tract inspection done: Pos       Blood Loss  Mother: Mary Callaway #[id number]     Start of Mother's Information      Delivery Blood Loss  23 1200 - 23 1618      None                 End of Mother's Information  Mother: Mary Callaway #[id number]                Delivery - Provider to Complete ([id number])    Delivering clinician: Elisa Hadley CNM  Delivery Type (Choose the 1 that will go to the Birth History): Vaginal, Spontaneous                         Other personnel:  Provider Role   Mikayla Reyes, Marly Moss RN                     Placenta    Date/Time: 2023  " 3:43 PM  Removal: Spontaneous  Comments: 3VC, Rey mechanism, intact  Disposition: Hospital disposal             Anesthesia    Method: Epidural  Cervical dilation at placement: 4-7                    Presentation and Position    Presentation: Vertex    Position: Right Occiput Anterior                   Assessment/Plan:   : routine postpartum cares, AM hemoglobin, anticipate discharge PPD #2  Lactating mother: lactation support and consultation EMMANUEL Horton, TONIAM

## 2023-08-12 NOTE — TELEPHONE ENCOUNTER
Pt due tomorrow, pt started contractions around 4:00 am.  Pt having intense back pain, stomach pain, pressure in pelvis.  No fluid break.  OB Triage Call      Is patient's OB/Midwife with the [facility] or [facility]? [facility]- Proceed with triage     Reason for call: Pt due date 23.  Pt started contractions at 4:00 am and hard to time, not sure if < 5 min apart.  No water break, pt wanting to know as they live a distance from [facility] if she should come in for evaluation, requesting a call back.     Assessment: Beginning of labor    Plan: CNM On call paged @ 6:57 am.  Julia Medrano returned page at 6:59 am    Patient plans to deliver at [facility]    Patient's primary OB Provider is CNM.      Per protocol recommendations Consult needed for [facility] MD or Midwife.  Patient's primary OB is [facility]. Paged on-call midwife for patient's primary OB clinic (refer to where patient is seen as midwives may go to multiple locations) Julia Medrano to call [facility] back at 06:57 am.  Call returned at 06:59 am and advised on triage assessment. Does midwife recommend L&D evaluation? No- Per midwife on-call CNM will call pt now.       Is patient's delivering hospital on divert? No      39w6d    Estimated Date of Delivery: Aug 13, 2023        OB History    Para Term  AB Living   1 0 0 0 0 0   SAB IAB Ectopic Multiple Live Births   0 0 0 0 0      # Outcome Date GA Lbr Ky/2nd Weight Sex Delivery Anes PTL Lv   1 Current                No results found for: GBS       Gerda Thakkar 23 6:55 AM  [facility] Nurse Advisor     Reason for Disposition   [1] First baby (primipara) AND [2] contractions < 6 minutes apart  AND [3] present 2 hours    Additional Information   Negative: Passed out (i.e., lost consciousness, collapsed and was not responding)   Negative: Shock suspected (e.g., cold/pale/clammy skin, too weak to stand, low BP, rapid pulse)   Negative: Difficult to awaken or acting confused (e.g.,  "disoriented, slurred speech)   Negative: [1] SEVERE abdominal pain (e.g., excruciating) AND [2] constant AND [3] present > 1 hour   Negative: Severe bleeding (e.g., continuous red blood from vagina, or large blood clots)   Negative: Umbilical cord hanging out of the vagina (shiny, white, curled appearance, \"like telephone cord\")   Negative: Uncontrollable urge to push (i.e., feels like baby is coming out now)   Negative: Can see baby   Negative: Sounds like a life-threatening emergency to the triager   Negative: Pregnant < 37 weeks (i.e., )   Negative: [1] Uncertain delivery date AND [2] possibly pregnant < 37 weeks (i.e., )    Protocols used: Pregnancy - Labor-A-AH    "

## 2023-08-12 NOTE — ANESTHESIA PROCEDURE NOTES
Epidural catheter Procedure Note    Pre-Procedure   Staff -        Anesthesiologist:  To Gomes MD       Performed By: anesthesiologist       Location: OB       Pre-Anesthestic Checklist: patient identified, IV checked, risks and benefits discussed, informed consent, pre-op evaluation and at physician/surgeon's request  Timeout:       Correct Patient: Yes        Correct Procedure: Yes        Correct Position: Yes   Procedure Documentation  Procedure: epidural catheter       Patient Position: sitting       Patient Prep/Sterile Barriers: sterile gloves, mask, patient draped, 3 applications of betadine, and waited for it to completely dry       Skin prep: Betadine       Local skin infiltrated with 3 mL of 1% lidocaine.        Insertion Site: L3-4. (midline approach).       Technique: LORT air        REJI at 4.5 cm.       Needle Type: ToAsthmatrackery needle       Needle Gauge: 17.        Needle Length (Inches): 3.5        Catheter: 19 G.          Catheter threaded easily.         4 cm epidural space.           # of attempts: 1 and  # of redirects:     Assessment/Narrative         Paresthesias: No.       Test dose of mL lidocaine 1.5% w/ 1:200,000 epinephrine at.         Test dose negative, 3 minutes after injection, for signs of intravascular, subdural, or intrathecal injection.       Insertion/Infusion Method: LORT air       Aspiration negative for Heme or CSF via Epidural Catheter.    Medication(s) Administered   0.125% Bupivacaine + 2 mcg/mL Fentanyl via CADD (Epidural) - EPIDURAL   9 mL - 8/12/2023 10:50:00 AM   Comments:  Test dose of 3cc negative, then additional 2cc of test dose given.    Adhesive spray, tegaderm, and tape to secure  Epidural bolused with pump mix    Orders to manage the epidural infusion have been entered and, through coordination with the nurse, we will continue to manage and monitor the patient's labor epidural.  We will continuously be available to adjust as needed throughout the entire labor  "and delivery process.        FOR [facility]) ONLY:   Pain Team Contact information: please page the Pain Team Via [facility]. Search \"Pain\". During daytime hours, please page the attending first. At night please page the resident first.      "

## 2023-08-12 NOTE — PLAN OF CARE
Data: Mary Callaway transferred to 408 via wheelchair at 1820. Baby transferred via parent's arms.  Action: Receiving unit notified of transfer: Yes. Patient and family notified of room change. Bedside report given to PAUL OLSON at 1820. Belongings sent to receiving unit.   Accompanied by Registered Nurse.   Oriented patient to surroundings. Call light within reach. ID bands double-checked with receiving RN.   Response: Patient tolerated transfer and is stable.    VSS throughout recovery.   Fundal assessment WNL F@U,ML, Light to scant bleeding.   KRISTAN out tip intact.   Pt able to stand bedside after recovery but right leg still weak. Transferred pt to restroom via sera stedy.   Pt urinated 100cc.   Pericare education complete.   Patient educated to call PP RN prior to ambulating again once she needs to use restroom or wants to get out of bed. Pt verbalizes understanding.   Transferred to 408 via wheelchair.   Bedside report given, band sverified.    EMILY RESENDEZ

## 2023-08-12 NOTE — H&P
[facility] Labor Admission History & Physical    Mary Callaway is a 27 year old  with an IUP at 39w6d  ;   Partner/support Person: Juan  Language Barrier: English  Clinic: [facility]  Provider: TONIAM's    Mary Callaway is admitted to the Birthplace at [facility] on 2023 at 8:25 AM     History of present inllness/Chief Complaint:  Had membranes swept in clinic yesterday, started having regular contractions arouns 0400, presented to [facility] for evaluation ~0800    Here with: spontaneous onset of labor  Patient reports contractions are Regular           Baby active Yes  Membranes are intact.  Bloody show No   Any changes with medical history since last prenatal visit No      Obstetrical history  Estimated Date of Delivery: Aug 13, 2023 determined by LMP  Patient's last menstrual period was 2022.   Dating U/S: 2022    Fetal anatomic survey: Normal  Placenta: Anterior     PRENATAL COURSE  Prenatal care began at 7 wks gestation for a total of 15 prenatal visits.  Total wt gain 20; Body mass index is 32.1 kg/m .  Prenatal Blood Pressure: WNL  Prenatal course was complicated by hx PSVT  Tdap: 23    Patient Active Problem List   Diagnosis    Encounter for supervision of normal first pregnancy in third trimester    PSVT (paroxysmal supraventricular tachycardia) (H)    Encounter for triage in pregnant patient    Indication for care in labor or delivery       HISTORY  Allergies   Allergen Reactions    Lactose Other (See Comments)     Past Medical History:   Diagnosis Date    Breast disorder 2019    Breast lump removal in 2019(left breast)    PSVT (paroxysmal supraventricular tachycardia) (H) 2019    Varicella 1995    As an infant/child     Past Surgical History:   Procedure Laterality Date    BREAST SURGERY  2019    Lump removal    HERNIA REPAIR  1998    infant    REMOVE HARDWARE UPPER EXTREMITY Left 10/11/2022    Procedure: left removal of superficial implant  "(Nexplanon) left upper arm;  Surgeon: Paz Holloway MD;  Location: [facility] OR     Family History   Problem Relation Age of Onset    Anesthesia Reaction Mother     Pulmonary Embolism Mother         multiple blood clots in lungs     Colon Cancer Father     Asthma Father     Breast Cancer Maternal Grandmother     Hypertension Paternal Grandmother     Hyperlipidemia Paternal Grandmother     Cerebrovascular Disease Paternal Grandmother     Breast Cancer Paternal Grandmother     Osteoporosis Paternal Grandmother     Depression Sister     Anxiety Disorder Sister     Mental Illness Sister      Social History     Tobacco Use    Smoking status: Never     Passive exposure: Never    Smokeless tobacco: Never   Substance Use Topics    Alcohol use: Not Currently     Comment: Socially     OB History    Para Term  AB Living   1 0 0 0 0 0   SAB IAB Ectopic Multiple Live Births   0 0 0 0 0      # Outcome Date GA Lbr Ky/2nd Weight Sex Delivery Anes PTL Lv   1 Current                LABS:  Lab Results   Component Value Date    AS Negative 2023    HGB 12.0 2023    HEPBANG Nonreactive 2023       GBS Status: neg  Rubella: Immune    HIV: Non-Reactive   Platelets:  270 on 23  1hr GCT:  74    ROS   Pt is alert and oriented  Pt denies significant constitutional symptoms including fever and/or malaise.    Pt denies significant respiratory, cardiovacular, GI, or muscular/skeletal complaints.    Neuro: Denies HA and visual changes  Muscoloskeletal: Denies except for discomforts r/t pregnancy     PHYSICAL EXAM:  /81   Temp 99.4  F (37.4  C) (Temporal)   Resp 16   Ht 1.626 m (5' 4\")   Wt 84.8 kg (187 lb)   LMP 2022   BMI 32.10 kg/m    General appearance:  healthy, alert, active, and breathing through contractions    Heart: RRR  Lungs: CTA bilaterally, normal respiratory effort  Abdomen: gravid, single vertex fetus, non-tender, EFW 7.5 lbs.   Legs:  trace edema     Contractions: Pt " is rock every 2-4 minutes    Fetal heart tones: Baseline 130   Variability: moderate   Accelerations: present  Decelerations: absent      EFM: Category I     Cervix: 3.5/ 80%/ Mid/ soft/ -2, Vtx per RN exam in MAC  Bloody show: no  Membranes:  intact     ASSESSMENT:  27 year old  with choi IUP 39w6d in early labor  EFM: Category I   GBS negative and membranes intact    PLAN:  Routine CNM care  Labs ordered: CBC, syphilis screening, and type and screen  Teaching done r/t comfort measures, pain management options, and labor processes,   Admit - see IP orders  Pain medication - as requested by Mary. Aware of all options, hoping for unmedicated delivery but open to medications if necessary   Anticipate   Code status discussed, desire Full Code: YES     EMMANUEL Jefferson, TONIAM

## 2023-08-12 NOTE — PROGRESS NOTES
"CNM PROGRESS NOTE    SUBJECTIVE:  Mary is comfortable with her epidural. Was able to get a 30-60 min nap and is feeling much better. Just having a little heartburn. Juan is at the bedside for support    OBJECTIVE:  /55   Temp 97.7  F (36.5  C) (Temporal)   Resp 20   Ht 1.626 m (5' 4\")   Wt 84.8 kg (187 lb)   LMP 2022   SpO2 95%   BMI 32.10 kg/m      Fetal heart tones: Baseline 120   Variability: moderate  Accelerations: present  Decelerations: intermittent early and variable decelerations     Contractions: Pt is rock every 2-4 minutes, lasting 50-80 seconds and palpates strong    Cervix: 9/ % / 0  ROM: presumed ruptured at time of SVE (1330), small amount of meconium fluid     Pitocin- none  Antibiotics- none    ASSESSMENT:  IUP @ 39w6d active labor and good progress   GBS- negative  Category II EFM (intermittent variable decelerations, moderate variability)     PLAN:   Continue with frequent position changes to facilitate fetal descent and rotation  Discussed with Mary and Juan amniotic fluid appears to have meconium in it, very small amount of fluid so hard to say for certain but will plan for NICU at delivery for suspected MSF  Anticipate   Reevaluate as clinically indicated     EMMANUEL Jefferson, TONIAM    "

## 2023-08-12 NOTE — TELEPHONE ENCOUNTER
"Received page from [facility] at 06:57 that pt was laboring at home.     Writer returned call to patient at 07:06 and spoke with her and Juan. They reported that contractions woke her up around 04:00. She has been experiencing contractions every 3-5 minutes for at least the last hour. No bloody show. Membranes are intact. She is unsure of fetal activity level during labor, \"it's difficult to feel.\" Reports low pelvic pressure with contractions. Speaking though some contractions and breathing through others on the phone.     States she had her membranes swept in clinic yesterday afternoon. Had a small streak of blood to underwear overnight, but otherwise no bleeding.     She does live in [place] and reports anxiety related to >20 minute drive to hospital. Based on frequency of contractions and patient discomfort, recommended that patient present to labor and delivery for evaluation. [facility] L&D made aware.     EMMANUEL Blakely CNM        "

## 2023-08-12 NOTE — PLAN OF CARE
Problem: Labor  Goal: Hemostasis  Outcome: Met  Goal: Stable Fetal Wellbeing  Outcome: Met  Intervention: Promote and Monitor Fetal Wellbeing  Recent Flowsheet Documentation  Taken 8/12/2023 1645 by Mikayla Reyes, RN  Body Position: sitting up in bed  Taken 8/12/2023 1426 by Mikayla Reyes, RN  Body Position: (throne) --  Goal: Effective Progression to Delivery  Outcome: Met  Goal: Absence of Infection Signs and Symptoms  Outcome: Met  Goal: Acceptable Pain Control  Outcome: Met  Goal: Normal Uterine Contraction Pattern  Outcome: Met   Goal Outcome Evaluation:

## 2023-08-12 NOTE — PROGRESS NOTES
"CNM PROGRESS NOTE    SUBJECTIVE:  Mary expresses feeling very tired and anxious about labor progress and pain. Needing something for pain and wants to move forward with epidural.     Epidural prep under way     OBJECTIVE:  /81   Temp 99.4  F (37.4  C) (Temporal)   Resp 16   Ht 1.626 m (5' 4\")   Wt 84.8 kg (187 lb)   LMP 2022   BMI 32.10 kg/m      Fetal heart tones: Baseline 125   Variability: moderate  Accelerations: present  Decelerations: absent    Contractions: Pt is rock every 2-4 minutes, lasting  seconds and palpates strong    Cervix: deferred   ROM: not ruptured    Pitocin- none  Antibiotics- none    ASSESSMENT:  IUP @ 39w6d active labor   GBS- negative  EFM Category I     PLAN:   Epidural prep underway, anesthesia paged for epidural   Ok for hubbard catheter placement after epidural, will perform SVE at that time. Discussed if minimal change since admission would recommend AROM, Mary is agreeable  Anticipate EMMANUEL Briseno, CNM       "

## 2023-08-12 NOTE — ANESTHESIA PREPROCEDURE EVALUATION
Anesthesia Pre-Procedure Evaluation    Patient: Mary Callaway   MRN: [id number] : 1995        Procedure : * No procedures listed *          Past Medical History:   Diagnosis Date    Breast disorder 2019    Breast lump removal in 2019(left breast)    PSVT (paroxysmal supraventricular tachycardia) (H) 2019    Varicella     As an infant/child      Past Surgical History:   Procedure Laterality Date    BREAST SURGERY  2019    Lump removal    HERNIA REPAIR  1998    infant    REMOVE HARDWARE UPPER EXTREMITY Left 10/11/2022    Procedure: left removal of superficial implant (Nexplanon) left upper arm;  Surgeon: Paz Holloway MD;  Location: [facility] OR      Allergies   Allergen Reactions    Lactose Other (See Comments)      Social History     Tobacco Use    Smoking status: Never     Passive exposure: Never    Smokeless tobacco: Never   Substance Use Topics    Alcohol use: Not Currently     Comment: Socially      Wt Readings from Last 1 Encounters:   23 84.8 kg (187 lb)        Anesthesia Evaluation   Pt has had prior anesthetic.     No history of anesthetic complications       ROS/MED HX  ENT/Pulmonary:       Neurologic:       Cardiovascular:       METS/Exercise Tolerance:     Hematologic:       Musculoskeletal:       GI/Hepatic:       Renal/Genitourinary:       Endo:       Psychiatric/Substance Use:       Infectious Disease:       Malignancy:       Other:            Physical Exam    Airway        Mallampati: II   TM distance: > 3 FB   Neck ROM: full   Mouth opening: > 3 cm    Respiratory Devices and Support         Dental       (+) Minor Abnormalities - some fillings, tiny chips      Cardiovascular   cardiovascular exam normal          Pulmonary   pulmonary exam normal                OUTSIDE LABS:  CBC:   Lab Results   Component Value Date    WBC 21.8 (H) 2023    WBC 17.6 (H) 2023    HGB 12.5 2023    HGB 12.0 2023    HCT 38.6 2023    HCT 36.5 2023      08/12/2023     07/18/2023     BMP:   Lab Results   Component Value Date     02/28/2023    POTASSIUM 3.7 02/28/2023    CHLORIDE 104 02/28/2023    CO2 21 (L) 02/28/2023    BUN 10.9 02/28/2023    CR 0.47 (L) 02/28/2023    GLC 78 02/28/2023     COAGS: No results found for: PTT, INR, FIBR  POC:   Lab Results   Component Value Date    HCG Negative 10/11/2022     HEPATIC:   Lab Results   Component Value Date    ALBUMIN 4.2 02/28/2023    PROTTOTAL 7.3 02/28/2023    ALT 16 02/28/2023    AST 24 02/28/2023    ALKPHOS 46 02/28/2023    BILITOTAL 0.3 02/28/2023     OTHER:   Lab Results   Component Value Date    INGRID 9.4 02/28/2023    TSH 0.56 01/23/2023       Anesthesia Plan    ASA Status:  2       Anesthesia Type: Epidural.              Consents    Anesthesia Plan(s) and associated risks, benefits, and realistic alternatives discussed. Questions answered and patient/representative(s) expressed understanding.     - Discussed:     - Discussed with:  Patient            Postoperative Care            Comments:    Other Comments: Healthy primip            To Grant Gomes MD

## 2023-08-12 NOTE — PLAN OF CARE
Pt is a  at 39.6 weeks who arrived for Rule out labor.  Pt states that she started having regular contractions at 0400 today that have continued to intensify.  Pt states she has had an uncomplicated pregnancy, prenatal reviewed.  Pt denies any leaking of fluid or vaginal bleeding.  BP WNL.  Monitors applied after obtaining verbal consent.  Pt and spouse oriented to room and call light given. Elisa HAUSER CNM notified of pt status and SVE, plan to admit pt to [facility] for further labor cares.

## 2023-08-13 LAB — HGB BLD-MCNC: 11.4 G/DL (ref 11.7–15.7)

## 2023-08-13 PROCEDURE — 85018 HEMOGLOBIN: CPT | Performed by: ADVANCED PRACTICE MIDWIFE

## 2023-08-13 PROCEDURE — 250N000013 HC RX MED GY IP 250 OP 250 PS 637

## 2023-08-13 PROCEDURE — 250N000013 HC RX MED GY IP 250 OP 250 PS 637: Performed by: ADVANCED PRACTICE MIDWIFE

## 2023-08-13 PROCEDURE — 120N000012 HC R&B POSTPARTUM

## 2023-08-13 PROCEDURE — 36415 COLL VENOUS BLD VENIPUNCTURE: CPT | Performed by: ADVANCED PRACTICE MIDWIFE

## 2023-08-13 RX ORDER — ONDANSETRON 4 MG/1
4 TABLET, FILM COATED ORAL EVERY 6 HOURS PRN
Status: DISCONTINUED | OUTPATIENT
Start: 2023-08-13 | End: 2023-08-14 | Stop reason: HOSPADM

## 2023-08-13 RX ORDER — POLYETHYLENE GLYCOL 3350 17 G/17G
17 POWDER, FOR SOLUTION ORAL DAILY
Status: DISCONTINUED | OUTPATIENT
Start: 2023-08-13 | End: 2023-08-14 | Stop reason: HOSPADM

## 2023-08-13 RX ADMIN — ACETAMINOPHEN 650 MG: 325 TABLET, FILM COATED ORAL at 11:38

## 2023-08-13 RX ADMIN — BENZOCAINE: 11.4 AEROSOL, SPRAY TOPICAL at 06:01

## 2023-08-13 RX ADMIN — ACETAMINOPHEN 650 MG: 325 TABLET, FILM COATED ORAL at 17:39

## 2023-08-13 RX ADMIN — ACETAMINOPHEN 650 MG: 325 TABLET, FILM COATED ORAL at 06:00

## 2023-08-13 RX ADMIN — IBUPROFEN 800 MG: 400 TABLET ORAL at 17:39

## 2023-08-13 RX ADMIN — DOCUSATE SODIUM 100 MG: 100 CAPSULE, LIQUID FILLED ORAL at 07:42

## 2023-08-13 RX ADMIN — CALCIUM CARBONATE (ANTACID) CHEW TAB 500 MG 1000 MG: 500 CHEW TAB at 21:33

## 2023-08-13 RX ADMIN — IBUPROFEN 800 MG: 400 TABLET ORAL at 11:38

## 2023-08-13 RX ADMIN — POLYETHYLENE GLYCOL 3350 17 G: 17 POWDER, FOR SOLUTION ORAL at 11:38

## 2023-08-13 RX ADMIN — IBUPROFEN 800 MG: 400 TABLET ORAL at 06:00

## 2023-08-13 ASSESSMENT — ACTIVITIES OF DAILY LIVING (ADL)
ADLS_ACUITY_SCORE: 18

## 2023-08-13 NOTE — PLAN OF CARE
Patient doing well, having adequate pain control with tylenol and ibuprofen PO.  Fundus firm below umbilicus, lochia scant, no clots.  Vital signs stable.  Assisted with checking latch this morning with breast feeding.  Encouraged to call for help with breast feeding prn or any other questions/concerns.  Will continue to monitor.

## 2023-08-13 NOTE — PLAN OF CARE
Breastfeeding. Denies pain, patients reports heaviness & in RLE. Voiding. VSS. Has not ambulated yet. Bonding with baby.

## 2023-08-13 NOTE — PLAN OF CARE
Vitals within defined limits. Fundus firm. Lochia scant. Using Tylenol/Motrin for perineal soreness with good relief. Using ice packs/tucks for comfort. Voiding without issues. Up ad verna. Breastfeeding  per cues, reviewed football hold and hand expression. Working on  cares. Encouraged to call with questions/concerns. Spouse at bedside and supportive.

## 2023-08-13 NOTE — LACTATION NOTE
Initial visit with Mother and Father and baby girl.  Mother states breast feeding is going well.  She states she feels like she is getting a deep latch with feeding.    Baby girl at the breast at time of visit.  LC reviewed with Mother proper positioning of baby, maternal hand placement, using breast feeding support pillows, and how to help baby achieve a deep latch with feedings.  Reviewed importance of getting a deep latch with feedings versus a shallow latch.   Baby girl tolerates feeding well.        Breast feeding general information reviewed.    Appreciative of visit.  No further questions at this time. Will follow as needed.   Maribel Rebolledo RN, IBCLC

## 2023-08-13 NOTE — PROGRESS NOTES
"[facility] Progress Note: Postpartum Day #1    2023  10:49 AM    SUBJECTIVE:  Mary is resting in bed following baby Renae's first bath with partner Juan supportive at bedside. She feels well about her birth experience, happy to have chosen the epidural when she did. Feels that pain is well managed on current regimen, notes soreness to bottom and cramping with breastfeeding. She has taken a shower, not tried a bath yet. She has not yet had a BM. Reports personal history of constipation and nausea with severe constipation. Requesting every day Miralax for management of constipation. She also quests a rx for Zofran on discharge due to history of nausea. Planning to discharge tomorrow morning if all is well.    Patient is stable and is tolerating acitivity well  Baby is rooming in  Complications since 2 hours post delivery: None  Pain is well controlled.  Patient is taking pain medications.  Breastfeeding status:initiated   Elimination:  She is voiding without difficulty.  She has not had a bowel movement  Desired contraception Nexplanon  Denies heavy bleeding and passing large clots.    OBJECTIVE:  /72   Pulse 79   Temp 98.3  F (36.8  C) (Oral)   Resp 16   Ht 1.626 m (5' 4\")   Wt 84.8 kg (187 lb)   LMP 2022   SpO2 99%   Breastfeeding Unknown   BMI 32.10 kg/m      Constitutional: healthy, alert, and no distress  Breasts: Currently breastfeeding  Fundus: Uterine fundus is firm, non-tender and at 1 cm below the level of the umbilicus per RN charting  Perineum: Perineum is well approximated, minimal swelling per RN charting  Lochia: Lochia is appropriate for the duration of time since delivery per RN charting and patient report.     ASSESSMENT:  PPD #1    Lactating Mother  History of constipation    Hemoglobin   Date Value Ref Range Status   2023 11.4 (L) 11.7 - 15.7 g/dL Final     PLAN:  Continue routine care  Miralax now and daily ordered for constipation " prevention per pt preference.  Reviewed breastfeeding  Reviewed postpartum warning signs  Reviewed postpartum care plan including MyChart check-in within one week, 2 week and 6 week visits.  Plans Nexplanon for contraception postpartum at 6 week PP visit. Reviewed recommendation for condoms prior to Nexplanon placement. Pt not completely decided on method of contraception, but prefers Nexplanon if hormonal option is utilized.    Anticipated discharge tomorrow morning. Plan fr zofran Rx on discharge per pt preference due to long history of severe constipation and nausea with constipation.     EMMANUEL BlakelyM

## 2023-08-14 VITALS
OXYGEN SATURATION: 99 % | TEMPERATURE: 97.9 F | SYSTOLIC BLOOD PRESSURE: 121 MMHG | RESPIRATION RATE: 16 BRPM | HEART RATE: 70 BPM | BODY MASS INDEX: 29.78 KG/M2 | HEIGHT: 64 IN | DIASTOLIC BLOOD PRESSURE: 73 MMHG | WEIGHT: 174.4 LBS

## 2023-08-14 PROCEDURE — 250N000011 HC RX IP 250 OP 636

## 2023-08-14 PROCEDURE — 250N000013 HC RX MED GY IP 250 OP 250 PS 637: Performed by: ADVANCED PRACTICE MIDWIFE

## 2023-08-14 PROCEDURE — 250N000013 HC RX MED GY IP 250 OP 250 PS 637

## 2023-08-14 RX ORDER — ONDANSETRON 4 MG/1
4 TABLET, FILM COATED ORAL EVERY 6 HOURS PRN
Qty: 30 TABLET | Refills: 0 | Status: SHIPPED | OUTPATIENT
Start: 2023-08-14 | End: 2023-09-18

## 2023-08-14 RX ORDER — ACETAMINOPHEN 325 MG/1
650 TABLET ORAL EVERY 4 HOURS PRN
Qty: 90 TABLET | Refills: 0 | Status: SHIPPED | OUTPATIENT
Start: 2023-08-14 | End: 2023-09-18

## 2023-08-14 RX ORDER — DOCUSATE SODIUM 100 MG/1
100 CAPSULE, LIQUID FILLED ORAL 2 TIMES DAILY PRN
Qty: 90 CAPSULE | Refills: 0 | Status: SHIPPED | OUTPATIENT
Start: 2023-08-14 | End: 2023-09-18

## 2023-08-14 RX ORDER — IBUPROFEN 800 MG/1
800 TABLET, FILM COATED ORAL EVERY 6 HOURS PRN
Qty: 90 TABLET | Refills: 0 | Status: SHIPPED | OUTPATIENT
Start: 2023-08-14 | End: 2023-09-18

## 2023-08-14 RX ORDER — POLYETHYLENE GLYCOL 3350 17 G/17G
17 POWDER, FOR SOLUTION ORAL DAILY
Qty: 510 G | Refills: 0 | Status: SHIPPED | OUTPATIENT
Start: 2023-08-14 | End: 2023-09-18

## 2023-08-14 RX ADMIN — FAMOTIDINE 20 MG: 20 TABLET ORAL at 03:10

## 2023-08-14 RX ADMIN — POLYETHYLENE GLYCOL 3350 17 G: 17 POWDER, FOR SOLUTION ORAL at 08:30

## 2023-08-14 RX ADMIN — ACETAMINOPHEN 650 MG: 325 TABLET, FILM COATED ORAL at 08:24

## 2023-08-14 RX ADMIN — ONDANSETRON HYDROCHLORIDE 4 MG: 4 TABLET, FILM COATED ORAL at 00:32

## 2023-08-14 ASSESSMENT — ACTIVITIES OF DAILY LIVING (ADL)
ADLS_ACUITY_SCORE: 18

## 2023-08-14 NOTE — DISCHARGE SUMMARY
[facility]    Discharge Summary  Obstetrics    Date of Admission:  2023  Date of Discharge:  2023  Discharging Provider: Elisa Cha CNM  Date of Service (when I saw the patient): 23    Discharge Diagnoses     Lactating mother   Constipation-  managed with prn Colace and daily Miralax   Nausea- managed with prn Zofran     History of Present Illness   Mary Callaway is a 27 year old female who presented with spontaneous onset of labor. Her labor progressed as expected to an  of a viable infant female on 23    Hospital Course   The patient's hospital course was unremarkable.  She recovered as anticipated and experienced no post-delivery complications.  On discharge, her pain was well controlled. Vaginal bleeding is stable.  Voiding without difficulty.  Ambulating well and tolerating a normal diet.  No fever.  Breastfeeding well.  Infant is stable.  She was discharged on post-partum day #2.    Post-partum hemoglobin:   Hemoglobin   Date Value Ref Range Status   2023 11.4 (L) 11.7 - 15.7 g/dL Final       EMMANUEL Jefferson CNM    Discharge Disposition   Discharged to home   Condition at discharge: Stable    Primary Care Physician   Physician No Ref-Primary    Consultations This Hospital Stay   ANESTHESIOLOGY IP CONSULT  LACTATION IP CONSULT    Discharge Orders      Postpartum Home Care Referral      Activity    Activity as tolerated     Reason for your hospital stay    Maternity care     Follow Up    Follow up with provider in 2 weeks and 6 weeks for post-delivery checks     Breast pump - Manual/Electric    Breast Pump Documentation:  Manual/Electric Pump: To support adequate breast milk production and nutrition for infant.     I, the undersigned, certify that the above prescribed supplies are medically necessary for this patient and is both reasonable and necessary in reference to accepted standards of medical and necessary in reference to  accepted standards of medical practice in the treatment of this patient's condition and is not prescribed as a convenience.     Diet    Resume previous diet     Discharge Medications   Current Discharge Medication List        START taking these medications    Details   acetaminophen (TYLENOL) 325 MG tablet Take 2 tablets (650 mg) by mouth every 4 hours as needed for mild pain or fever  Qty: 90 tablet, Refills: 0    Associated Diagnoses:  (spontaneous vaginal delivery)      docusate sodium (COLACE) 100 MG capsule Take 1 capsule (100 mg) by mouth 2 times daily as needed for constipation  Qty: 90 capsule, Refills: 0    Associated Diagnoses:  (spontaneous vaginal delivery); Constipation, unspecified constipation type      ibuprofen (ADVIL/MOTRIN) 800 MG tablet Take 1 tablet (800 mg) by mouth every 6 hours as needed for other (cramping)  Qty: 90 tablet, Refills: 0    Associated Diagnoses:  (spontaneous vaginal delivery)      ondansetron (ZOFRAN) 4 MG tablet Take 1 tablet (4 mg) by mouth every 6 hours as needed for nausea or vomiting  Qty: 30 tablet, Refills: 0    Associated Diagnoses: Nausea      polyethylene glycol (MIRALAX) 17 GM/Dose powder Take 17 g by mouth daily  Qty: 510 g, Refills: 0    Associated Diagnoses: Constipation, unspecified constipation type           CONTINUE these medications which have NOT CHANGED    Details   Prenatal Vit-Fe Fumarate-FA (PRENATAL MULTIVITAMIN W/IRON) 27-0.8 MG tablet Take 1 tablet by mouth daily           STOP taking these medications       doxylamine (UNISOM) 25 MG TABS tablet Comments:   Reason for Stopping:             Allergies   No Known Allergies

## 2023-08-14 NOTE — PLAN OF CARE
Goal Outcome Evaluation:      Plan of Care Reviewed With: patient, spouse    Overall Patient Progress: improvingOverall Patient Progress: improving    D: VSS, assessments WDL.   I: Pt. received complete discharge paperwork and home medications as filled by discharge pharmacy given.  Pt. was given times of last dose for all discharge medications in writing on discharge medication sheets.  Discharge teaching included home medication, pain management, activity restrictions, postpartum cares, and signs and symptoms of infection.    A: Discharge outcomes on care plan met.  Mother states understanding and comfort with self care and follow up care.   P: Pt. Discharged.  Pt. was accompanied by  and left with personal belongings.    Pt. to follow up with OB provider per discharge instructions.  Pt. had no further questions at the time of discharge and no unmet needs were identified.

## 2023-08-14 NOTE — PLAN OF CARE
Goal Outcome Evaluation:    Vital signs stable. Postpartum assessment WDL, fundus firm at 1 below U with scant flow. Pain controlled with tylenol and ibuprofen. Patient voiding and ambulating without difficulty. Breastfeeding on cue with no assistance from staff. Patient complained of nausea and had an emesis occurrence x1, zofran and pepcid were administered. Patient experienced relief. Patient and infant bonding well. Spouse at bedside, very attentive to patient. Will continue with current plan of care. Encouraged to call with any questions or needs.

## 2023-08-14 NOTE — LACTATION NOTE
"This note was copied from a baby's chart.  Lactation check-in prior to discharge. Baby Renae feeding at time of visit; she's finished feeding on the right side and is sleepy. Infant aroused and offered left breast. Mary able to latch infant well. Deep latch with nutritive suckling pattern and frequent audible swallows. She feeds for approximately 10 minutes on left side before detaching.    Reviewed/Highlighted  breastfeeding basics:   1) Watch for early feeding cues (licking lips, stirring or rooting, sucking movement with mouth, hands to mouth) and always breast feed on DEMAND.  2) Infant should breastfeed a minimum of 8 times in 24 hours. If it has been 3 hours since last breast feeding session, un-swaddle infant and begin skin to skin to entice infant to nurse.  3) Techniques to waking a sleepy baby to nurse: (undress infant, change diaper if necessary, gently stroking bottom of feet and back, snuggling infant skin to skin, expressing colostrum).      Discussed physiology of milk production from colostrum through milk coming in and how the breasts should begin to feel \"heavy or full\" between day 3-5. Answered questions regarding \"how to know when infant is done at the breast\". Educated to infant satiety signs; encouraged listening for audible swallows along with watching for changes in infant's stool color. Discussed normal infant weight loss and when infant should be back to birth weight. Stressed the importance of continuing to track infant's feeds and void/stools patterns, at least until infant has returned to her birth weight.     Discussed pumping (when it's helpful, when it's necessary, and when to begin pumping for milk storage), along with when to introduce a bottle. Suggested \"Guide to Postpartum and  Care\" handbook is a great resource going forward for topics that include engorgement, plugged milk ducts, mastitis, safe sleep, and safety of baby.     Fozia Del Toro, RN, IBCLC       "

## 2023-08-14 NOTE — PROGRESS NOTES
Received page from Deja OLSON that pt was nauseous and unable to sleep. Patient is requesting Zofran for management of symptoms. She believes nausea is related to anxiety.     Zofran 4mg PO q6hrs PRN ordered.   Plan to continue to monitor.  Notify provider if nausea worsens, if other symptoms develop, or if mood worsens.    EMMANUEL Blakely CNM

## 2023-08-14 NOTE — PROVIDER NOTIFICATION
MD Notification    Notified Person: MD    Notified Person Name: Julia Medrano    Notification Date/Time: 8/13 @5769    Notification Interaction: Phone    Purpose of Notification: Ordering zofran for nausea.    Orders Received: PRN order received.     Comments:

## 2023-08-14 NOTE — ANESTHESIA POSTPROCEDURE EVALUATION
Patient: Mary Callaway    Procedure: * No procedures listed *       Anesthesia Type:  Epidural    Note:  Disposition: Inpatient   Postop Pain Control: Uneventful            Sign Out: Well controlled pain   PONV: No   Neuro/Psych: Uneventful            Sign Out: Acceptable/Baseline neuro status   Airway/Respiratory: Uneventful            Sign Out: Acceptable/Baseline resp. status   CV/Hemodynamics: Uneventful            Sign Out: Acceptable CV status   Other NRE: NONE   DID A NON-ROUTINE EVENT OCCUR? No    Event details/Postop Comments:  No epidural related complaints            Last vitals:  Vitals:    08/13/23 0326 08/13/23 0743 08/13/23 1559   BP: 122/79 115/72 104/65   Pulse: 82 79 69   Resp: 16 16 16   Temp: 36.6  C (97.9  F) 36.8  C (98.3  F) 36.9  C (98.4  F)   SpO2:          Electronically Signed By: Allison Ortiz  August 13, 2023  7:26 PM

## 2023-08-31 ENCOUNTER — VIRTUAL VISIT (OUTPATIENT)
Dept: MIDWIFE SERVICES | Facility: CLINIC | Age: 28
End: 2023-08-31
Payer: COMMERCIAL

## 2023-08-31 PROCEDURE — 99024 POSTOP FOLLOW-UP VISIT: CPT | Performed by: ADVANCED PRACTICE MIDWIFE

## 2023-08-31 ASSESSMENT — PATIENT HEALTH QUESTIONNAIRE - PHQ9
5. POOR APPETITE OR OVEREATING: NOT AT ALL
SUM OF ALL RESPONSES TO PHQ QUESTIONS 1-9: 0

## 2023-08-31 ASSESSMENT — ANXIETY QUESTIONNAIRES
6. BECOMING EASILY ANNOYED OR IRRITABLE: NOT AT ALL
GAD7 TOTAL SCORE: 0
7. FEELING AFRAID AS IF SOMETHING AWFUL MIGHT HAPPEN: NOT AT ALL
1. FEELING NERVOUS, ANXIOUS, OR ON EDGE: NOT AT ALL
3. WORRYING TOO MUCH ABOUT DIFFERENT THINGS: NOT AT ALL
GAD7 TOTAL SCORE: 0
2. NOT BEING ABLE TO STOP OR CONTROL WORRYING: NOT AT ALL
IF YOU CHECKED OFF ANY PROBLEMS ON THIS QUESTIONNAIRE, HOW DIFFICULT HAVE THESE PROBLEMS MADE IT FOR YOU TO DO YOUR WORK, TAKE CARE OF THINGS AT HOME, OR GET ALONG WITH OTHER PEOPLE: NOT DIFFICULT AT ALL
5. BEING SO RESTLESS THAT IT IS HARD TO SIT STILL: NOT AT ALL

## 2023-08-31 NOTE — PROGRESS NOTES
Mary Callaway is a 27 year old female who is being evaluated via a billable telephone visit.      What phone number would you like to be contacted at? [phone]  How would you like to obtain your AVS? Temo      Originating Location (pt. Location): home       Distant Location (provider location):  On-site      Midwife Postpartum 2 Week Visit    Mary Callaway is a 27 year old      This visit took place over the phone. Start time 807, End time 812    Delivery date was 23. She had a Vaginal, Spontaneous  delivery of a viable girl, 39w6d named Renae, weight 7 pounds 5 oz., with complications of  None .    Information for the patient's :  Renae Callaway [[id number]]   7 lbs 5 oz    APGARs 8 , 9       Since delivery, she has been breastfeeding and pumping. Was initially just exclusively breastfeeding, but now is trying to switch more to bottles. Going well. Renae takes the bottle pretty well. Feels milk supply is good, no issues meeting Renae's needs.   She has not had any signs of infection, she describes her lochia after 2 weeks as none.  She has not had other complications.      She is voiding and having bowel movements without difficulty.       Contraception was discussed and patient desires Nexplanon implant at 6 weeks. Has had in past.   She has not had intercourse since delivery. She complains of no perineal discomfort.     Mood is Stable  Renae is sleeping as well as expected for a    is still home. 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       Current Outpatient Medications:     Prenatal Vit-Fe Fumarate-FA (PRENATAL MULTIVITAMIN W/IRON) 27-0.8 MG tablet, Take 1 tablet by mouth daily, Disp: , Rfl:     acetaminophen (TYLENOL) 325 MG tablet, Take 2 tablets (650 mg) by mouth every 4 hours as needed for mild pain or fever (Patient not  taking: Reported on 2023), Disp: 90 tablet, Rfl: 0    docusate sodium (COLACE) 100 MG capsule, Take 1 capsule (100 mg) by mouth 2 times daily as needed for constipation (Patient not taking: Reported on 2023), Disp: 90 capsule, Rfl: 0    ibuprofen (ADVIL/MOTRIN) 800 MG tablet, Take 1 tablet (800 mg) by mouth every 6 hours as needed for other (cramping) (Patient not taking: Reported on 2023), Disp: 90 tablet, Rfl: 0    ondansetron (ZOFRAN) 4 MG tablet, Take 1 tablet (4 mg) by mouth every 6 hours as needed for nausea or vomiting (Patient not taking: Reported on 2023), Disp: 30 tablet, Rfl: 0    polyethylene glycol (MIRALAX) 17 GM/Dose powder, Take 17 g by mouth daily (Patient not taking: Reported on 2023), Disp: 510 g, Rfl: 0.   OB History    Para Term  AB Living   1 1 1 0 0 1   SAB IAB Ectopic Multiple Live Births   0 0 0 0 1      # Outcome Date GA Lbr Ky/2nd Weight Sex Delivery Anes PTL Lv   1 Term 23 39w6d 03:10 / 00:33 3.317 kg (7 lb 5 oz) F Vag-Spont EPI N ZAY      Name: DWAYNE,FEMALE-CHRISTINA      Apgar1: 8  Apgar5: 9     Last pap:  No results found for: 2022 patient report (negative)  Hgb in hospital was 11.4    PHYSICAL/PELVIC EXAM:  Deferred, telephone visit      ASSESSMENT:   Normal postpartum exam after  Vaginal, Spontaneous .    ICD-10-CM    1. Routine postpartum follow-up  Z39.2       2. Lactating mother  Z39.1         PLAN:  No results found for any visits on 23.    Teaching: Birth control  Family Planning:Nexplanon  Encourage Kegels and abdominal exercise.  Continue a multivitamin/prenatal supplement, especially if breastfeeding.  Postpartum Hgb was not done today.  Nexplanon at 6 week PPV  Pap due 2025    Return at 6 weeks postpartum for physical and pelvic exam. Nexplanon insertion    Lisa BENITEZ CNM

## 2023-09-08 DIAGNOSIS — Z01.812 PRE-PROCEDURE LAB EXAM: Primary | ICD-10-CM

## 2023-09-15 NOTE — PROGRESS NOTES
"Midwife Postpartum 6 Week Visit    Mary Callaway is a 27 year old here for a postpartum checkup.       Delivery date was 23. She had a Vaginal, Spontaneous  delivery of a viable girl, 39w6d named Renae, weight 7 pounds 5 oz., with complications of  None .     Information for the patient's :  Renae Callaway [[id number]]   7 lbs 5 oz    APGARs 8 , 9     Since delivery, she has been breast feeding. Going \"good\". Pumping more than breastfeeding. She is gaining weight appropriately. She has not had any signs of infection, her lochia has stopped. She has not had other complications.      She is voiding and having bowel movements without difficulty.  Some constipation which is normal for her. Takes Miralax, but not on a consistent routine. Has a hemorrhoid that she noticed last night. Very painful     Contraception was discussed and patient desires Nexplanon.   She has not had intercourse since delivery.   She complains of no perineal discomfort.     Mood is Stable First couple week were up and down. Feels much better now.   Sleep: \"As much as you can with a .\" Renae sleeps about 4-5 hours at night.   Patient screened for postpartum depression.    was off for 1 month and then will get another 2 months off in the future when Mary goes back to work .   Depression Rating was:   Last PHQ-9 score on record =       2023    10:12 AM   PHQ-9 SCORE   PHQ-9 Total Score 0     Last GAD7 score on record =       2023    10:12 AM   OCTAVIA-7 SCORE   Total Score 0         ROS:  12 point review of systems negative other than symptoms noted below or in the HPI.       Current Outpatient Medications:     etonogestrel (NEXPLANON) 68 MG IMPL, 1 each (68 mg) by Subdermal route once, Disp: , Rfl:     Prenatal Vit-Fe Fumarate-FA (PRENATAL MULTIVITAMIN W/IRON) 27-0.8 MG tablet, Take 1 tablet by mouth daily, Disp: , Rfl:     Current Facility-Administered Medications:     etonogestrel (NEXPLANON) subdermal implant 68 " "mg, 1 each, Subdermal, Once, Lisa Gibson APRN CNM    lidocaine 1% with EPINEPHrine 1:100,000 injection 3 mL, 3 mL, Other, Once, Lisa Gibson APRN CNM.   OB History    Para Term  AB Living   1 1 1 0 0 1   SAB IAB Ectopic Multiple Live Births   0 0 0 0 1      # Outcome Date GA Lbr Ky/2nd Weight Sex Delivery Anes PTL Lv   1 Term 23 39w6d 03:10 / 00:33 3.317 kg (7 lb 5 oz) F Vag-Spont EPI N ZAY      Name: YOUNG,FEMALE-CHRISTINA      Apgar1: 8  Apgar5: 9     Last pap: 2022  Hgb in hospital was 11.4    EXAM:  /60   Ht 1.626 m (5' 4\")   Wt 72.1 kg (159 lb)   LMP 2022   Breastfeeding Yes   BMI 27.29 kg/m    BMI: Body mass index is 27.29 kg/m .  Constitutional: healthy, alert and no distress  Breast:Normal without masses, tenderness or nipple discharge and no palpable axillary masses or adenopathy, deferred, patient lactating.  Heart: RRR, no murmurs  Abdomen: Soft, non-tender    PELVIC EXAM:  Vulva: No lesions, well healed, no tenderness  Vagina: Moist, pink, discharge normal  well rugated, no lesions  Cervix:smooth, pink, no visible lesions  Uterus: Involuted to normal size, non-tender, no masses palpated  Ovaries: No masses palpated  Pelvic tone: Mild  Rectal exam: Hemorrhoid visible. Appears inflamed, approx quarter size      ASSESSMENT:   Normal postpartum exam after .    ICD-10-CM    1. Routine postpartum follow-up  Z39.2       2. Flu vaccine need  Z23 CANCELED: INFLUENZA VACCINE IM > 6 MONTHS VALENT IIV4 (AFLURIA/FLUZONE)      3. Insertion of implantable subdermal contraceptive  Z30.017 etonogestrel (NEXPLANON) 68 MG IMPL     etonogestrel (NEXPLANON) subdermal implant 68 mg     INSERTION NON-BIODEGRADABLE DRUG DELIVERY IMPLANT     lidocaine 1% with EPINEPHrine 1:100,000 injection 3 mL      4. Need for prophylactic vaccination and inoculation against influenza  Z23             PLAN:  Results for orders placed or performed in visit on 23   HCG Qual, Urine " ([id number])     Status: Normal   Result Value Ref Range    hCG Urine Qualitative Negative Negative       Return as needed or at time of next expected pap, pelvic, or breast exam.  Teaching: exercise and birth control  Family Planning: Nexplanon today  Encourage Jayleen  Discussed comfort measures for hemmorhoids  Continue a multivitamin/prenatal supplement, especially if breastfeeding.  Pap smear was not obtained today. Not due until 2025  Postpartum Hgb was not done today.    Return to clinic:  1 yr for annual exam     Lisa BENITEZ CNM          INDICATIONS:                                                      Patient presents for placement of Nexplanon for contraception.  She has had been counseled on side effects, risks, benefits and alternatives.  Patient desires to proceed.    Is a pregnancy test required: No.  Was a consent obtained?  Yes    Verification of Procedure:  Just before the procedure begans through verbal and active participation of team members, I verified:     Initials   Patient Name LS   Patient  LS   Procedure to be performed LS     Consent:  Risks, benefits of treatment, and alternative options for contraception were discussed.  Patient's questions were elicited and answered.  Written consent was obtained and scanned into medical record.     Today's PHQ-2 Score:       2023    10:12 AM   PHQ-2 (  [employer])   Q1: Little interest or pleasure in doing things 0   Q2: Feeling down, depressed or hopeless 0   PHQ-2 Score 0       PROCEDURE:                                                      Patient was resting comfortably on exam table, right arm placed at shoulder level in a 90 degree angle.  Skin was marked 8cm from epicondyl and cleansed with betadine solution.  Insertion site and track infiltrated with 3 cc 1% Lidocaine.      Nexplanon device visualized in applicator by patient and provider.  Skin punctured with applicator at insertion site and advanced with ease in the subdermal  space.  Applicator was removed. Nexplanon was palpated by provider and patient.    Small amount of bleeding noted at insertion site and no bruising noted along track of Nexplanon. Bandage and pressure dressing applied to insertion site.       R arm  NDC 61548-766-54  Exp 2025-03  LOT [id number]      POST PROCEDURE:                                                      To be removed/replaced within three years. Written and verbal instructions provided to patient.  She tolerated the procedure well. There were no complications. Patient was discharged in stable condition.    Keep dressing on and dry for 24 hours, then remove wrap.  Replace bandaid daily for 5 days. Keep your user card in a safe place where you'll remember it.  Make note of today's date for removal/replacement of your Nexplanon in 3 years. Call us if there is any redness, tenderness, warmth or drainage from the area of your Nexplanon insertion. Use a backup method of birth control for 7 days.      EMMANUEL WayneM    45 minutes total spent by me on the date of the encounter doing chart review, history and exam, documentation and further activities per the note. 15 of 45 minutes spend on Nexplanon procedure, 20 of 45 minutes spent on post-partum exam.

## 2023-09-18 ENCOUNTER — LAB (OUTPATIENT)
Dept: LAB | Facility: CLINIC | Age: 28
End: 2023-09-18
Payer: COMMERCIAL

## 2023-09-18 ENCOUNTER — PRENATAL OFFICE VISIT (OUTPATIENT)
Dept: MIDWIFE SERVICES | Facility: CLINIC | Age: 28
End: 2023-09-18
Payer: COMMERCIAL

## 2023-09-18 VITALS
SYSTOLIC BLOOD PRESSURE: 117 MMHG | HEIGHT: 64 IN | DIASTOLIC BLOOD PRESSURE: 60 MMHG | WEIGHT: 159 LBS | BODY MASS INDEX: 27.14 KG/M2

## 2023-09-18 DIAGNOSIS — Z97.5 NEXPLANON IN PLACE: ICD-10-CM

## 2023-09-18 DIAGNOSIS — Z30.017 INSERTION OF IMPLANTABLE SUBDERMAL CONTRACEPTIVE: ICD-10-CM

## 2023-09-18 DIAGNOSIS — Z01.812 PRE-PROCEDURE LAB EXAM: ICD-10-CM

## 2023-09-18 DIAGNOSIS — Z23 FLU VACCINE NEED: ICD-10-CM

## 2023-09-18 DIAGNOSIS — Z23 NEED FOR PROPHYLACTIC VACCINATION AND INOCULATION AGAINST INFLUENZA: ICD-10-CM

## 2023-09-18 PROBLEM — Z36.89 ENCOUNTER FOR TRIAGE IN PREGNANT PATIENT: Status: RESOLVED | Noted: 2023-08-12 | Resolved: 2023-09-18

## 2023-09-18 PROBLEM — Z34.03 ENCOUNTER FOR SUPERVISION OF NORMAL FIRST PREGNANCY IN THIRD TRIMESTER: Status: RESOLVED | Noted: 2022-12-26 | Resolved: 2023-09-18

## 2023-09-18 LAB — HCG UR QL: NEGATIVE

## 2023-09-18 PROCEDURE — 90471 IMMUNIZATION ADMIN: CPT | Performed by: ADVANCED PRACTICE MIDWIFE

## 2023-09-18 PROCEDURE — 99207 PR POST PARTUM EXAM: CPT | Performed by: ADVANCED PRACTICE MIDWIFE

## 2023-09-18 PROCEDURE — 11981 INSERTION DRUG DLVR IMPLANT: CPT | Performed by: ADVANCED PRACTICE MIDWIFE

## 2023-09-18 PROCEDURE — 81025 URINE PREGNANCY TEST: CPT

## 2023-09-18 PROCEDURE — 90686 IIV4 VACC NO PRSV 0.5 ML IM: CPT | Performed by: ADVANCED PRACTICE MIDWIFE

## 2023-09-18 RX ORDER — LIDOCAINE HYDROCHLORIDE AND EPINEPHRINE 10; 10 MG/ML; UG/ML
3 INJECTION, SOLUTION INFILTRATION; PERINEURAL ONCE
Status: COMPLETED | OUTPATIENT
Start: 2023-09-18 | End: 2023-09-18

## 2023-09-18 RX ADMIN — LIDOCAINE HYDROCHLORIDE AND EPINEPHRINE 3 ML: 10; 10 INJECTION, SOLUTION INFILTRATION; PERINEURAL at 12:04

## 2023-09-18 ASSESSMENT — ANXIETY QUESTIONNAIRES
3. WORRYING TOO MUCH ABOUT DIFFERENT THINGS: NOT AT ALL
6. BECOMING EASILY ANNOYED OR IRRITABLE: NOT AT ALL
2. NOT BEING ABLE TO STOP OR CONTROL WORRYING: NOT AT ALL
7. FEELING AFRAID AS IF SOMETHING AWFUL MIGHT HAPPEN: NOT AT ALL
IF YOU CHECKED OFF ANY PROBLEMS ON THIS QUESTIONNAIRE, HOW DIFFICULT HAVE THESE PROBLEMS MADE IT FOR YOU TO DO YOUR WORK, TAKE CARE OF THINGS AT HOME, OR GET ALONG WITH OTHER PEOPLE: NOT DIFFICULT AT ALL
GAD7 TOTAL SCORE: 0
1. FEELING NERVOUS, ANXIOUS, OR ON EDGE: NOT AT ALL
5. BEING SO RESTLESS THAT IT IS HARD TO SIT STILL: NOT AT ALL
GAD7 TOTAL SCORE: 0

## 2023-09-18 ASSESSMENT — PATIENT HEALTH QUESTIONNAIRE - PHQ9
5. POOR APPETITE OR OVEREATING: NOT AT ALL
SUM OF ALL RESPONSES TO PHQ QUESTIONS 1-9: 0

## 2023-09-18 NOTE — PATIENT INSTRUCTIONS
What Nexplanon Users May Expect    Nexplanon is well tolerated and has a low early-removal rate.    Insertion site complications, such as prolonged pain or infection, are rare. Removal is occasionally difficult, and rarely requires a surgical procedure in the operating room.    Menstrual changes are common with Nexplanon. Bleeding may become more or less frequent or heavy, or absent. The bleeding pattern after the first three months is predictive of future bleeding, but the pattern may change at any time. Average bleeding is 18 days over 3 months. Over 50% of women experience rare or absent bleeding over the three year period, while 25% experience frequent or prolonged bleeding.    In clinical studies, users gained 3.7 pounds over two years. It is unknown what portion of this weight gain is related to Nexplanon    Women with a history of depressed mood may have worsening on Nexplanon, and may need to have the device removed.    Ectopic pregnancy is rare while using Nexplanon, but if you develop severe lower abdominal pain, see your healthcare provider.    Return to baseline ovulation patterns is seen 7-14 days after removal of Nexplanon.    Rarely, headaches and acne have also led to device removal.    Nexplanon should be removed if jaundice occurs, see your healthcare provider.    Nexplanon may be less effective in women who weight more than 130% of their ideal body weight.    Nexplanon does not protect against HIV or STDs.    Use a back-up method of birth control for the first 7 days after insertion of Nexplanon.    Please call [email or web address] at [phone] if you have questions or concerns.    For more complete information:  [email or web address]

## 2024-07-14 ENCOUNTER — HEALTH MAINTENANCE LETTER (OUTPATIENT)
Age: 29
End: 2024-07-14

## 2024-10-08 ENCOUNTER — OFFICE VISIT (OUTPATIENT)
Dept: MIDWIFE SERVICES | Facility: CLINIC | Age: 29
End: 2024-10-08
Payer: COMMERCIAL

## 2024-10-08 VITALS
HEIGHT: 64 IN | DIASTOLIC BLOOD PRESSURE: 64 MMHG | SYSTOLIC BLOOD PRESSURE: 117 MMHG | BODY MASS INDEX: 26.63 KG/M2 | WEIGHT: 156 LBS

## 2024-10-08 DIAGNOSIS — Z97.5 NEXPLANON IN PLACE: ICD-10-CM

## 2024-10-08 DIAGNOSIS — Z01.419 ENCNTR FOR GYN EXAM (GENERAL) (ROUTINE) W/O ABN FINDINGS: Primary | ICD-10-CM

## 2024-10-08 PROCEDURE — 99459 PELVIC EXAMINATION: CPT | Performed by: ADVANCED PRACTICE MIDWIFE

## 2024-10-08 PROCEDURE — 99395 PREV VISIT EST AGE 18-39: CPT | Performed by: ADVANCED PRACTICE MIDWIFE

## 2024-10-08 ASSESSMENT — PATIENT HEALTH QUESTIONNAIRE - PHQ9
5. POOR APPETITE OR OVEREATING: NOT AT ALL
SUM OF ALL RESPONSES TO PHQ QUESTIONS 1-9: 0

## 2024-10-08 ASSESSMENT — ANXIETY QUESTIONNAIRES
GAD7 TOTAL SCORE: 0
GAD7 TOTAL SCORE: 0
5. BEING SO RESTLESS THAT IT IS HARD TO SIT STILL: NOT AT ALL
6. BECOMING EASILY ANNOYED OR IRRITABLE: NOT AT ALL
IF YOU CHECKED OFF ANY PROBLEMS ON THIS QUESTIONNAIRE, HOW DIFFICULT HAVE THESE PROBLEMS MADE IT FOR YOU TO DO YOUR WORK, TAKE CARE OF THINGS AT HOME, OR GET ALONG WITH OTHER PEOPLE: NOT DIFFICULT AT ALL
1. FEELING NERVOUS, ANXIOUS, OR ON EDGE: NOT AT ALL
2. NOT BEING ABLE TO STOP OR CONTROL WORRYING: NOT AT ALL
3. WORRYING TOO MUCH ABOUT DIFFERENT THINGS: NOT AT ALL
7. FEELING AFRAID AS IF SOMETHING AWFUL MIGHT HAPPEN: NOT AT ALL

## 2024-10-08 NOTE — PROGRESS NOTES
.Mary is a 28 year old  female who presents for annual exam.     Besides routine health maintenance, she has no other health concerns today .  HPI:    Menses are irregular and heavy and irregular lasting  10  days-3 weeks.   Menses flow: heavy and spotty.    Patient's last menstrual period was 2024..   Using Neplanon for contraception.    She is currently considering pregnancy this Winter. Has an appt scheduled later this month for Nexplanon removal. Hoping cycles return to normal when removed.     REPRODUCTIVE/GYNECOLOGIC HISTORY:  Mary is sexually active with male partner(s) and is currently in monogamous relationship.   STI testing offered?  Declined  History of abnormal Pap smear:  No  SOCIAL HISTORY  Abuse: Does not report having previously been physical or sexually abused.    Do you feel safe in your environment? YES       She  reports that she has never smoked. She has never been exposed to tobacco smoke. She has never used smokeless tobacco.    Last PHQ-9 score on record =       10/8/2024     8:25 AM   PHQ-9 SCORE   PHQ-9 Total Score 0     Last GAD7 score on record =       10/8/2024     8:25 AM   OCTAVIA-7 SCORE   Total Score 0     Alcohol Score = 2    HEALTH MAINTENANCE:     Overdue          MAR 30  2023 YEARLY PREVENTIVE VISIT (Yearly)   Last completed: Mar 30, 2022     MARIANN 1  2024 PHQ-2 (once per calendar year) (Yearly, January to December)  Last completed: Sep 18, 2023     SEP 1  2024 INFLUENZA VACCINE (1)  Last completed: Sep 18, 2023     Never done COVID-19 Vaccine ( season)               HEALTHY HABITS  Eating habits: Not good, after breastfeeding kept eating the same. Noticed weight after breastfeeding increased. Gained 10 lbs since stopping. Done breastfeeding officially at 1 yr, supply decreased at 10 months.  Calcium/Vitamin D intake: source:  Dietary supplement(s)Takes vitamin D supplement  Exercise: How often do you exercise? 1-2 mile walk daily and work is physical.    Have you had an eye exam in the last two years? NO  Do you routinely see the dentist once or twice yearly? YES        HISTORY:  OB History    Para Term  AB Living   1 1 1 0 0 1   SAB IAB Ectopic Multiple Live Births   0 0 0 0 1      # Outcome Date GA Lbr Ky/2nd Weight Sex Type Anes PTL Lv   1 Term 23 39w6d 03:10 / 00:33 3.317 kg (7 lb 5 oz) F Vag-Spont EPI N ZAY      Name: CHRIS RICE      Apgar1: 8  Apgar5: 9     Past Medical History:   Diagnosis Date    Breast disorder 2019    Breast lump removal in (left breast)    PSVT (paroxysmal supraventricular tachycardia) (H) 2019    Varicella 1995    As an infant/child     Past Surgical History:   Procedure Laterality Date    BREAST SURGERY      Lump removal    HERNIA REPAIR      infant    REMOVE HARDWARE UPPER EXTREMITY Left 10/11/2022    Procedure: left removal of superficial implant (Nexplanon) left upper arm;  Surgeon: Paz Holloway MD;  Location: [facility] OR     Family History   Problem Relation Age of Onset    Anesthesia Reaction Mother     Pulmonary Embolism Mother         multiple blood clots in lungs     Colon Cancer Father     Asthma Father     Breast Cancer Maternal Grandmother     Hypertension Paternal Grandmother     Hyperlipidemia Paternal Grandmother     Cerebrovascular Disease Paternal Grandmother     Breast Cancer Paternal Grandmother     Osteoporosis Paternal Grandmother     Depression Sister     Anxiety Disorder Sister     Mental Illness Sister      Social History     Socioeconomic History    Marital status:    Tobacco Use    Smoking status: Never     Passive exposure: Never    Smokeless tobacco: Never   Vaping Use    Vaping status: Never Used   Substance and Sexual Activity    Alcohol use: Not Currently     Comment: Socially    Drug use: Never    Sexual activity: Yes     Partners: Male     Birth control/protection: None   Other Topics Concern    Parent/sibling w/ CABG, MI or angioplasty  "before 65F 55M? No   Social History Narrative    ** Merged History Encounter **            Current Outpatient Medications:     etonogestrel (NEXPLANON) 68 MG IMPL, 1 each (68 mg) by Subdermal route once, Disp: , Rfl:     Pediatric Multivitamins-Fl (MULTIVITAMIN WITH 1 MG FLUORIDE) 1 MG CHEW, , Disp: , Rfl:    No Known Allergies    Past medical, surgical, social and family history were reviewed and updated in EPIC.    ROS:   12 point review of systems negative other than symptoms noted below or in the HPI.    PHYSICAL EXAM:  /64   Ht 1.626 m (5' 4\")   Wt 70.8 kg (156 lb)   LMP 08/31/2024   Breastfeeding No   BMI 26.78 kg/m     BMI: Body mass index is 26.78 kg/m .  Constitutional: healthy, alert and no distress  Neck: Symmetrical, thyroid normal size, no masses present, no lymphadenopathy present.   Cardiovascular: RRR, no murmurs present  Respiratory: Breathing unlabored, lungs CTA bilaterally  Breast: Normal without masses, tenderness or nipple discharge and no palpable axillary masses or adenopathy  Gastrointestinal: Abdomen soft, non-tender, bowel sounds present  PELVIC EXAM:  Vulva: No lesions, no adenopathy, BUS WNL  Cervix: Neg CMT  Uterus: Normal size, non-tender, mobile  Ovaries: No masses palpated  Rectal exam: Deferred    ASSESSMENT/PLAN:    ICD-10-CM    1. Encntr for gyn exam (general) (routine) w/o abn findings  Z01.419 NC PELVIC EXAMINATION      2. Nexplanon in place  Z97.5         No results found for any visits on 10/08/24.      COUNSELING:   Reviewed preventive health counseling, as reflected in patient instructions       Regular exercise       Contraception       Family planning       Folic Acid  Encouraged to switch over to taking a prenatal vitamin when bottle of regular vitamins are done.   Declines needing any yearly lab work done today  Pap due 4/2025  Declines Covid vaccine. Reports flu vaccine done elsewhere      Lisa BENITEZ CNM      "

## 2024-10-08 NOTE — PATIENT INSTRUCTIONS
Preventive Health Recommendations  Female Ages 21 - 39  Yearly exam:   See your health care provider every year in order to  1. Review health changes.  2. Discuss preventive care.    3. Review your medicines if you your provider has prescribed any.    You do not need a Pap test if your uterus was removed (hysterectomy) and you have not had cancer.  You should be tested each year for STIs (sexually transmitted diseases) if you're at risk.   Talk to your provider about how often to have your cholesterol checked, about every 5 years if normal.  If you are at risk for diabetes, you should have a diabetes test (fasting glucose).  Vitamin D deficiency screening and thyroid disease screening is also recommended every 3-5 years depending on risk factors or more often if symptomatic  PAP Smear:   Until age 30: Get a Pap test every three years (more often if you have had an abnormal result)    After age 30: Talk to your provider about whether you should have a Pap test every 3 years or have a Pap test with HPV screening every 5 years.   Shots: Get a flu shot each year. Get a tetanus shot every 10 years.   Nutrition:   Eat at least 5 servings of fruits and vegetables each day  Eat REAL food, stay away from processed foods.  Eat whole-grain bread, whole-wheat pasta and brown rice instead of white grains and rice.  For bone health:  Eat calcium-rich foods or take calcium pills (500 to 600 mg) twice a day with food (1200 mg per day). Also take vitamin D (2000 IUs) each day.     Lifestyle  Exercise regularly (30 minutes a day, 5 days of the week). This will help you control your weight and prevent disease.  Weight bearing exercise such as weight lifting, walking, running and yoga will be beneficial later in life preventing osteoporosis   Limit alcohol to one drink per day.  No smoking.   Wear sunscreen to prevent skin cancer.  See your dentist every six months to one year for an exam and cleaning depending on their  "recommendation  Get an eye exam every two years or more often if you wear glasses or contacts.   \"I hope you had a positive experience and that you can definitely recommend [facility] to your family and friends. You ll be receiving a survey soon and I would look forward to hearing your feedback\".  "

## 2024-10-28 ENCOUNTER — OFFICE VISIT (OUTPATIENT)
Dept: MIDWIFE SERVICES | Facility: CLINIC | Age: 29
End: 2024-10-28
Payer: COMMERCIAL

## 2024-10-28 VITALS — BODY MASS INDEX: 27.19 KG/M2 | DIASTOLIC BLOOD PRESSURE: 64 MMHG | WEIGHT: 158.4 LBS | SYSTOLIC BLOOD PRESSURE: 110 MMHG

## 2024-10-28 DIAGNOSIS — Z30.46 ENCOUNTER FOR NEXPLANON REMOVAL: Primary | ICD-10-CM

## 2024-10-28 PROBLEM — Z97.5 NEXPLANON IN PLACE: Status: RESOLVED | Noted: 2023-09-18 | Resolved: 2024-10-28

## 2024-10-28 NOTE — PROGRESS NOTES
Nexplanon Removal:     Is a pregnancy test required: No.  Was a consent obtained?  Yes    Mary Callaway is here for removal of etonogestrel implant Nexplanon/Implanon    Indication: Desires pregnancy       Preoperative Diagnosis: etonogestrel implant  Postoperative Diagnosis: etonogestrel implant removed    Technique: On the right arm  Skin prep Betadine  Anesthesia 1% lidocaine, with epi  Procedure: Small incision (<5mm) was made at distal end of palpable implant, curved hemostat or mosquito forceps was used to isolate the implant and bring it to the incision, the fibrous capsule containing the implant  was incised and the Implant was removed intact.    EBL: minimal  Complications:  No  Tolerance:  Pt tolerated procedure well and was in stable condition.   Dressing:    A pressure bandage was placed for the next 12-24 hours.    Contraception was discussed and patient chose the following method none    Follow up: Pt was instructed to call if bleeding, severe pain or foul smell.     Waldo Swan, Student Nurse Midwife     I was present with the student who participated in the service and in the documentation of the note. I have verified the history and personally performed the physical exam and medical decision-making. I agree with the assessment and plan of care as documented in the note.       EMMANUEL Mccann CNM

## 2024-11-08 ENCOUNTER — VIRTUAL VISIT (OUTPATIENT)
Dept: FAMILY MEDICINE | Facility: OTHER | Age: 29
End: 2024-11-08
Payer: COMMERCIAL

## 2024-11-08 DIAGNOSIS — J01.90 ACUTE SINUSITIS WITH SYMPTOMS > 10 DAYS: Primary | ICD-10-CM

## 2024-11-08 PROCEDURE — 99213 OFFICE O/P EST LOW 20 MIN: CPT | Mod: 95 | Performed by: PHYSICIAN ASSISTANT

## 2024-11-08 RX ORDER — BENZONATATE 100 MG/1
100 CAPSULE ORAL 3 TIMES DAILY PRN
Qty: 30 CAPSULE | Refills: 0 | Status: SHIPPED | OUTPATIENT
Start: 2024-11-08

## 2024-11-08 ASSESSMENT — ENCOUNTER SYMPTOMS: COUGH: 1

## 2024-11-08 NOTE — PATIENT INSTRUCTIONS
Symptoms most consistent with sinusitis. Will prescribe Augmentin to take twice daily for 10 days.   Drink plenty of fluids.  Can use an over the counter Nettipot or sinus rinse to help with nasal congestion. Mucinex can also be helpful.   I also recommend Flonase to help with nasal swelling.  Tessalon pearls for the cough.  Follow up if symptoms are not improving.

## 2024-11-08 NOTE — PROGRESS NOTES
Mary is a 28 year old who is being evaluated via a billable video visit.    How would you like to obtain your AVS? MyChart  If the video visit is dropped, the invitation should be resent by: Text to cell phone: [phone]  Will anyone else be joining your video visit? No    Assessment & Plan       ICD-10-CM    1. Acute sinusitis with symptoms > 10 days  J01.90 benzonatate (TESSALON) 100 MG capsule     amoxicillin-clavulanate (AUGMENTIN) 875-125 MG tablet          Symptoms most consistent with sinusitis. Will prescribe Augmentin to take twice daily for 10 days.   Drink plenty of fluids.  Can use an over the counter Nettipot or sinus rinse to help with nasal congestion. Mucinex can also be helpful.   I also recommend Flonase to help with nasal swelling.  Tessalon pearls for the cough.  Follow up if symptoms are not improving.            Subjective   Mary is a 28 year old, presenting for the following health issues:  Cough and URI        11/8/2024     8:34 AM   Additional Questions   Roomed by Salud TORRES     History of Present Illness       Reason for visit:  URI Sx        Acute Illness  Acute illness concerns: cough, congestion, fatigue  Onset/Duration: 3 weeks  Symptoms:  Fever: No  Chills/Sweats: No  Headache (location?): YES- global  Sinus Pressure: YES  Conjunctivitis:  No  Ear Pain: no  Rhinorrhea: No  Congestion: YES  Sore Throat: not really  Cough: YES-productive of clear sputum  Wheeze: No  Decreased Appetite: YES  Nausea: YES  Vomiting: No  Diarrhea: No  Dysuria/Freq.: No  Dysuria or Hematuria: No  Fatigue/Achiness: YES  Sick/Strep Exposure: YES  Therapies tried and outcome: OTC decongestant without much benefit      Symptoms not improving for 3 weeks. She works as a nurse at the [employer]. A lot of postnasal drainage and coughing is worse in the mornings and evenings.    Review of Systems  Constitutional, HEENT, cardiovascular, pulmonary, gi and gu systems are negative, except as otherwise noted.       Objective         Vitals:  No vitals were obtained today due to virtual visit.    Physical Exam   GENERAL: alert and no distress  EYES: Eyes grossly normal to inspection.  No discharge or erythema, or obvious scleral/conjunctival abnormalities.  RESP: No audible wheeze, cough, or visible cyanosis.    SKIN: Visible skin clear. No significant rash, abnormal pigmentation or lesions.  NEURO: Cranial nerves grossly intact.  Mentation and speech appropriate for age.  PSYCH: Appropriate affect, tone, and pace of words        Video-Visit Details    Type of service:  Video Visit   Originating Location (pt. Location): Home  Distant Location (provider location):  Off-site  Platform used for Video Visit: Mil  Signed Electronically by: Chas Christianson PA-C

## 2024-11-20 ENCOUNTER — OFFICE VISIT (OUTPATIENT)
Dept: FAMILY MEDICINE | Facility: CLINIC | Age: 29
End: 2024-11-20
Payer: COMMERCIAL

## 2024-11-20 ENCOUNTER — ANCILLARY PROCEDURE (OUTPATIENT)
Dept: GENERAL RADIOLOGY | Facility: CLINIC | Age: 29
End: 2024-11-20
Attending: PHYSICIAN ASSISTANT
Payer: COMMERCIAL

## 2024-11-20 VITALS
DIASTOLIC BLOOD PRESSURE: 79 MMHG | TEMPERATURE: 98.2 F | BODY MASS INDEX: 28.1 KG/M2 | HEIGHT: 63 IN | SYSTOLIC BLOOD PRESSURE: 121 MMHG | HEART RATE: 71 BPM | RESPIRATION RATE: 16 BRPM | WEIGHT: 158.6 LBS | OXYGEN SATURATION: 100 %

## 2024-11-20 DIAGNOSIS — M54.2 NECK PAIN: ICD-10-CM

## 2024-11-20 DIAGNOSIS — M54.9 UPPER BACK PAIN: ICD-10-CM

## 2024-11-20 DIAGNOSIS — N92.6 IRREGULAR PERIODS: ICD-10-CM

## 2024-11-20 DIAGNOSIS — Z76.89 ENCOUNTER TO ESTABLISH CARE: Primary | ICD-10-CM

## 2024-11-20 LAB — HCG UR QL: NEGATIVE

## 2024-11-20 PROCEDURE — 81025 URINE PREGNANCY TEST: CPT | Performed by: PHYSICIAN ASSISTANT

## 2024-11-20 ASSESSMENT — PAIN SCALES - GENERAL: PAINLEVEL_OUTOF10: NO PAIN (0)
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
## 2024-11-21 NOTE — RESULT ENCOUNTER NOTE
Mary,    Good news - xray looks normal. No change in the plan we discussed at your visit. Reach out with any questions!    Take care,  June Su PA-C on 11/21/2024 at 10:05 AM

## 2025-01-23 ENCOUNTER — VIRTUAL VISIT (OUTPATIENT)
Dept: OBGYN | Facility: CLINIC | Age: 30
End: 2025-01-23
Payer: COMMERCIAL

## 2025-01-23 VITALS — HEIGHT: 63 IN | BODY MASS INDEX: 28 KG/M2 | WEIGHT: 158 LBS

## 2025-01-23 DIAGNOSIS — Z34.81 SUPERVISION OF NORMAL INTRAUTERINE PREGNANCY IN MULTIGRAVIDA IN FIRST TRIMESTER: Primary | ICD-10-CM

## 2025-01-23 DIAGNOSIS — Z13.79 GENETIC SCREENING: ICD-10-CM

## 2025-01-23 DIAGNOSIS — O36.80X0 PREGNANCY WITH INCONCLUSIVE FETAL VIABILITY: ICD-10-CM

## 2025-01-23 PROBLEM — Z34.80 SUPERVISION OF NORMAL INTRAUTERINE PREGNANCY IN MULTIGRAVIDA: Status: ACTIVE | Noted: 2025-01-23

## 2025-01-23 NOTE — PROGRESS NOTES
SUBJECTIVE:     HPI:    This is a 29 year old female patient,  who presents for her first obstetrical visit.    CHRIS: 2025, by Last Menstrual Period.  She is 9w0d weeks.  Her cycles are regular.  Her last menstrual period was normal.   Since her LMP, she has experienced  nausea and fatigue.    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      She has a history of   Full term uncomplicated pregnancy with NVSD    Since her last LMP she denies use of alcohol, tobacco and street drugs.    Past medical, surgical, social and family history were reviewed and updated in [facility].      Current Outpatient Medications   Medication Sig Dispense Refill    Prenat w/o A-FeCbGl-DSS-FA-DHA (PNV OB+DHA PO) Take by mouth.       No current facility-administered medications for this visit.       ROS:   12 point review of systems negative other than symptoms noted below or in the HPI.  Constitutional: Fatigue  Gastrointestinal: Nausea    Confirmed patient desires delivery at [facility] with the [facility] provider.    Nurse phone visit completed. Prenatal book and folder (containing standard educational hand-outs and brochures)  will be given at the next visit to patient. Information in folder reviewed over the phone. Questions answered. Information given on optional screening available to assess chromosomal anomalies. Pt desires NIPS. Pt advised to call the clinic if she has any questions or concerns related to her  "pregnancy. Prenatal labs future ordered.  Irene Rees RN on 1/23/2025 at 8:28 AM      No results found for: \"PAP\"    PHQ-9 score:        1/21/2025     3:48 PM   PHQ   PHQ-9 Total Score 0    Q9: Thoughts of better off dead/self-harm past 2 weeks Not at all       Patient-reported                   9/18/2023    10:12 AM 10/8/2024     8:25 AM 1/21/2025     3:48 PM   OCTAVIA-7 SCORE   Total Score   0 (minimal anxiety)   Total Score 0 0 0        Patient-reported           Patient supplied answers from flow sheet for:  Prenatal OB Questionnaire.  Past Medical History  Have you ever recieved care for your mental health? : (Patient-Rptd) No  Have you ever been in a major accident or suffered serious trauma?: (Patient-Rptd) No  Within the last year, has anyone hit, slapped, kicked or otherwise hurt you?: (Patient-Rptd) No  In the last year, has anyone forced you to have sex when you didn't want to?: (Patient-Rptd) No    Past Medical History 2   Have you ever received a blood transfusion?: (Patient-Rptd) No  Would you accept a blood transfusion if was medically recommended?: (Patient-Rptd) Yes  Does anyone in your home smoke?: (Patient-Rptd) No   Is your blood type Rh negative?: (Patient-Rptd) Unknown  Have you ever ?: (!) (Patient-Rptd) Yes  Have you been hospitalized for a nonsurgical reason excluding normal delivery?: (Patient-Rptd) No  Have you ever had an abnormal pap smear?: (Patient-Rptd) No    Past Medical History (Continued)  Do you have a history of abnormalities of the uterus?: (Patient-Rptd) No  Did your mother take ZENAIDA or any other hormones when she was pregnant with you?: (Patient-Rptd) No  Do you have any other problems we have not asked about which you feel may be important to this pregnancy?: (Patient-Rptd) No                   OBJECTIVE:     EXAM:  Ht 1.612 m (5' 3.47\")   Wt 71.7 kg (158 lb)   LMP 11/21/2024   BMI 27.58 kg/m   Body mass index is 27.58 kg/m .  "

## 2025-01-23 NOTE — ASSESSMENT & PLAN NOTE
**HR  FOB: Juan    CHRIS: Aug 28, 2025  BT Placenta:  Sex:   Genetic: yes      Tdap:          Flu:           GBS:     Problems  F/U next visit

## 2025-02-03 LAB
ABO + RH BLD: NORMAL
BLD GP AB SCN SERPL QL: NEGATIVE
SPECIMEN EXP DATE BLD: NORMAL

## 2025-02-04 ENCOUNTER — PRENATAL OFFICE VISIT (OUTPATIENT)
Dept: MIDWIFE SERVICES | Facility: CLINIC | Age: 30
End: 2025-02-04
Payer: COMMERCIAL

## 2025-02-04 ENCOUNTER — ANCILLARY PROCEDURE (OUTPATIENT)
Dept: ULTRASOUND IMAGING | Facility: CLINIC | Age: 30
End: 2025-02-04
Payer: COMMERCIAL

## 2025-02-04 VITALS
BODY MASS INDEX: 26.53 KG/M2 | SYSTOLIC BLOOD PRESSURE: 118 MMHG | HEIGHT: 64 IN | WEIGHT: 155.4 LBS | DIASTOLIC BLOOD PRESSURE: 76 MMHG

## 2025-02-04 DIAGNOSIS — I47.10 PSVT (PAROXYSMAL SUPRAVENTRICULAR TACHYCARDIA): ICD-10-CM

## 2025-02-04 DIAGNOSIS — Z3A.10 10 WEEKS GESTATION OF PREGNANCY: ICD-10-CM

## 2025-02-04 DIAGNOSIS — Z34.81 SUPERVISION OF NORMAL INTRAUTERINE PREGNANCY IN MULTIGRAVIDA IN FIRST TRIMESTER: Primary | ICD-10-CM

## 2025-02-04 DIAGNOSIS — O36.80X0 PREGNANCY WITH INCONCLUSIVE FETAL VIABILITY: ICD-10-CM

## 2025-02-04 DIAGNOSIS — Z13.79 GENETIC SCREENING: ICD-10-CM

## 2025-02-04 LAB
ALBUMIN UR-MCNC: NEGATIVE MG/DL
APPEARANCE UR: ABNORMAL
BACTERIA #/AREA URNS HPF: ABNORMAL /HPF
BILIRUB UR QL STRIP: NEGATIVE
COLOR UR AUTO: YELLOW
ERYTHROCYTE [DISTWIDTH] IN BLOOD BY AUTOMATED COUNT: 12.1 % (ref 10–15)
EST. AVERAGE GLUCOSE BLD GHB EST-MCNC: 100 MG/DL
GLUCOSE UR STRIP-MCNC: NEGATIVE MG/DL
HBA1C MFR BLD: 5.1 % (ref 0–5.6)
HBV SURFACE AG SERPL QL IA: NONREACTIVE
HCT VFR BLD AUTO: 41.7 % (ref 35–47)
HCV AB SERPL QL IA: NONREACTIVE
HGB BLD-MCNC: 14 G/DL (ref 11.7–15.7)
HGB UR QL STRIP: NEGATIVE
HIV 1+2 AB+HIV1 P24 AG SERPL QL IA: NONREACTIVE
KETONES UR STRIP-MCNC: NEGATIVE MG/DL
LEUKOCYTE ESTERASE UR QL STRIP: ABNORMAL
MCH RBC QN AUTO: 29.4 PG (ref 26.5–33)
MCHC RBC AUTO-ENTMCNC: 33.6 G/DL (ref 31.5–36.5)
MCV RBC AUTO: 87 FL (ref 78–100)
NITRATE UR QL: NEGATIVE
PH UR STRIP: 7 [PH] (ref 5–7)
PLATELET # BLD AUTO: 392 10E3/UL (ref 150–450)
RBC # BLD AUTO: 4.77 10E6/UL (ref 3.8–5.2)
RBC #/AREA URNS AUTO: ABNORMAL /HPF
SP GR UR STRIP: 1.02 (ref 1–1.03)
SQUAMOUS #/AREA URNS AUTO: ABNORMAL /LPF
UROBILINOGEN UR STRIP-ACNC: 1 E.U./DL
WBC # BLD AUTO: 13.2 10E3/UL (ref 4–11)
WBC #/AREA URNS AUTO: ABNORMAL /HPF

## 2025-02-04 PROCEDURE — 87340 HEPATITIS B SURFACE AG IA: CPT

## 2025-02-04 PROCEDURE — 86900 BLOOD TYPING SEROLOGIC ABO: CPT

## 2025-02-04 PROCEDURE — G2211 COMPLEX E/M VISIT ADD ON: HCPCS

## 2025-02-04 PROCEDURE — 86803 HEPATITIS C AB TEST: CPT

## 2025-02-04 PROCEDURE — 86780 TREPONEMA PALLIDUM: CPT

## 2025-02-04 PROCEDURE — 87086 URINE CULTURE/COLONY COUNT: CPT

## 2025-02-04 PROCEDURE — 87389 HIV-1 AG W/HIV-1&-2 AB AG IA: CPT

## 2025-02-04 PROCEDURE — 81001 URINALYSIS AUTO W/SCOPE: CPT

## 2025-02-04 PROCEDURE — 36415 COLL VENOUS BLD VENIPUNCTURE: CPT

## 2025-02-04 PROCEDURE — 86787 VARICELLA-ZOSTER ANTIBODY: CPT

## 2025-02-04 PROCEDURE — 76801 OB US < 14 WKS SINGLE FETUS: CPT | Performed by: OBSTETRICS & GYNECOLOGY

## 2025-02-04 PROCEDURE — 83036 HEMOGLOBIN GLYCOSYLATED A1C: CPT

## 2025-02-04 PROCEDURE — 86901 BLOOD TYPING SEROLOGIC RH(D): CPT

## 2025-02-04 PROCEDURE — 99214 OFFICE O/P EST MOD 30 MIN: CPT

## 2025-02-04 PROCEDURE — 85027 COMPLETE CBC AUTOMATED: CPT

## 2025-02-04 PROCEDURE — 86762 RUBELLA ANTIBODY: CPT

## 2025-02-04 PROCEDURE — 86850 RBC ANTIBODY SCREEN: CPT

## 2025-02-04 NOTE — PROGRESS NOTES
SUBJECTIVE:      HPI:     This is a 29 year old female patient,  who presents for her first obstetrical visit.     CHRIS: 2025, by Last Menstrual Period.  She is 9w0d weeks.  Her cycles are regular.  Her last menstrual period was normal.   Since her LMP, she has experienced  nausea and fatigue.     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     Past medical, surgical, social and family history were reviewed and updated in EPIC.        Current Facility-Administered Medications          Current Outpatient Medications   Medication Sig Dispense Refill    Prenat w/o A-FeCbGl-DSS-FA-DHA (PNV OB+DHA PO) Take by mouth.          No current facility-administered medications for this visit.            ROS:   12 point review of systems negative other than symptoms noted below or in the HPI.  Constitutional: Fatigue  Gastrointestinal: Nausea     PHQ-9 score:         2025     3:48 PM   PHQ   PHQ-9 Total Score 0    Q9: Thoughts of better off dead/self-harm past 2 weeks Not at all       Patient-reported              2023    10:12 AM 10/8/2024     8:25 AM 2025     3:48 PM   OCTAVIA-7 SCORE   Total Score     0 (minimal anxiety)   Total Score 0 0 0        Patient-reported               Patient supplied answers from flow sheet for:  Prenatal OB  Questionnaire.  Past Medical History  Have you ever recieved care for your mental health? : (Patient-Rptd) No  Have you ever been in a major accident or suffered serious trauma?: (Patient-Rptd) No  Within the last year, has anyone hit, slapped, kicked or otherwise hurt you?: (Patient-Rptd) No  In the last year, has anyone forced you to have sex when you didn't want to?: (Patient-Rptd) No     Past Medical History 2   Have you ever received a blood transfusion?: (Patient-Rptd) No  Would you accept a blood transfusion if was medically recommended?: (Patient-Rptd) Yes  Does anyone in your home smoke?: (Patient-Rptd) No   Is your blood type Rh negative?: (Patient-Rptd) Unknown  Have you ever ?: (!) (Patient-Rptd) Yes  Have you been hospitalized for a nonsurgical reason excluding normal delivery?: (Patient-Rptd) No  Have you ever had an abnormal pap smear?: (Patient-Rptd) No     Past Medical History (Continued)  Do you have a history of abnormalities of the uterus?: (Patient-Rptd) No  Did your mother take ZENAIDA or any other hormones when she was pregnant with you?: (Patient-Rptd) No  Do you have any other problems we have not asked about which you feel may be important to this pregnancy?: (Patient-Rptd) No                OB HISTORY  OB History    Para Term  AB Living   2 1 1 0 0 1   SAB IAB Ectopic Multiple Live Births   0 0 0 0 1      # Outcome Date GA Lbr Ky/2nd Weight Sex Type Anes PTL Lv   2 Current            1 Term 23 39w6d 03:10 / 00:33 3.317 kg (7 lb 5 oz) F Vag-Spont EPI N ZAY      Name: Renae      Apgar1: 8  Apgar5: 9       History of GDM: No,  PTL : No,  History of HTN in pregnancy: No,  Thrombocytopenia: No,  History of Sickle Cell, thalassemia or other hereditary blood disorder: No,  Shoulder dystocia: No,  Vacuum Extraction: No  PPH: No   3rd of 4th degree laceration: No.   Other complications: No    PERSONAL HISTORY  Exercise Habits:  Active chasing a toddler and working  "12s  Employment: Full time. Her job involves moderate activity with no potential for toxic exposure.    Travel plans:  are international travel.   Diet: Pescatarian  Abuse concerns? No     OBJECTIVE:     EXAM:  /76   Ht 1.613 m (5' 3.5\")   Wt 70.5 kg (155 lb 6.4 oz)   LMP 2024   BMI 27.10 kg/m   Body mass index is 27.1 kg/m .    GENERAL: alert and no distress  RESP: unlabored breathing  CV: well-perfused  MS: no gross musculoskeletal defects noted, no edema  SKIN: no suspicious lesions or rashes to visualized skin  NEURO: alert and oriented x4  PSYCH: mentation appears normal, affect normal/bright    ASSESSMENT/PLAN:       ICD-10-CM    1. Supervision of normal intrauterine pregnancy in multigravida in first trimester  Z34.81 Urine Culture Aerobic Bacterial     *UA reflex to Microscopic     Urine Microscopic Exam     Varicella Zoster Virus Antibody IgG     ABO/Rh type and screen     Hepatitis B surface antigen     CBC with platelets     HIV Antigen Antibody Combo     Rubella Antibody IgG     Treponema Abs w Reflex to RPR and Titer     Hemoglobin A1c     Hepatitis C antibody     Varicella Zoster Virus Antibody IgG      2. PSVT (paroxysmal supraventricular tachycardia)  I47.10       3. Genetic screening  Z13.79 Myriad Non-Invasive Prenatal Screening-Prequel      4. 10 weeks gestation of pregnancy  Z3A.10           29 year old , On 2025 patient is 10w5d weeks of pregnancy with CHRIS of 2025, by Last Menstrual Period    Discussed as follows:  - Dating: confirmed by early ultrasound. EDC is 25. Final ultrasound results reviewed in clinic today.  - Genetic screening: NIPS desired by pt, consent and requisition forms signed; ordered.  - Mood is stable.  - ASA: not indicated this pregnancy.  - MFM  referral is not indicated.  - Pap due for screening in April. Will complete postpartum.  - Family history of cholestasis; sister had it with 2 of her babies. Reviewed warning signs. Will " monitor later in pregnancy. Baseline labwork not indicated due to no personal history of HCP.     COUNSELING  Instructed on use of triage nurse line and contacting the on call CNM after hours in an emergency.   Symptoms of N&V and fatigue usually start to resolve around 12-16 weeks   Reviewed CNM philosophy, call schedule for labor and delivery, and FSH for delivery  1st OB handout given outlining appointment spacing and CNM information  Reviewed exercise and nutrition  Recommend to gain 15-25 pounds with her pregnancy.  Discussed OTC medications. OB med list given  Encouraged patient to take PNV's/DHA  Travel precautions discussed, no air travel after 36 weeks and COVID recommendations discussed  Will notify patient with lab results when available    F/U to be addressed next visit:  lab results, offer GC/CT    Will return to the clinic in 4-6 weeks for her next routine prenatal check.  Will call to be seen sooner if problems arise.    30 minutes spent by me on the date of the encounter doing chart review, history and exam, documentation and further activities per the note    EMMANUEL BlakelyM

## 2025-02-04 NOTE — PATIENT INSTRUCTIONS
Thank you for coming to see the Midwives at the   [facility]!    Please take prenatal vitamin with DHA once daily during the entire pregnancy.    We will notify you about your labs that were drawn when we get the results back.  If you have MyChart your lab results will be posted there. Someone from the clinic will send you a [employer] message or call you personally with your results.    If you need any refills of medications please call your pharmacy, and they will contact us.    If you have a medical emergency please call 911.    If you have any concerns about today's visit, wish to schedule another appointment, or have an urgent medical concern please call our office at [phone]. You can also make appointments through [employer].    After hours you may also call the clinic number above to be connected with [place]'s after hours triage nurse. The nurse can page the midwife on call, if needed. There is always a midwife on call 24 hours a day.    Prenatal Care Recommendations:    Before 14 weeks: Dating ultrasound, genetic testing       This ultrasound helps us determine your dates accurately. Noninvasive prenatal testing (genetic screening test) can be drawn anytime after 10 weeks of gestation; this test can also predict the baby's sex.    16 weeks: Optional genetic testing AFP       This testing helps understand your baby's risk for some genetic abnormalities.    19-21 weeks:  Screening anatomy ultrasound       This testing will look for early growth abnormalities, placenta location, and may tell the baby's sex if you wish to find out.    24-27 weeks: One hour diabetes test (GCT) and complete blood count       This test helps identify diabetes of pregnancy or gestational diabetes.  We also look at the iron in your blood and how well your blood clots.    28 weeks: Tetanus shot (Tdap)       This shot helps protect you and your baby from whooping cough.    32 weeks: Respiratory syncytial virus  shot (RSV) (seasonal)       This shot helps protect you and your baby from RSV.    36 weeks and later: Group B Strep test (GBS)       This test helps predict if you need antibiotics in labor to prevent your baby from getting an infection.    Anytime September to April:  Flu shot       This shot helps protect you and your family from the flu.  This is especially important during pregnancy.        The typical schedule after your first visit today you can expect:     Visit 2 - 12-16 weeks  Visit 3 - 20 weeks  Visit 4 - 24 weeks  Visit 5 - 28 weeks  Visit 6 - 30 weeks  Visit 7 - 32 weeks  Visit 8 - 34 weeks  Visit 9 - 36 weeks  Weekly after 36 weeks until delivery.        Any time during or after your pregnancy you may experience increased depression and/or mood changes.    We are here to support you. Please contact us if you are:  Feeling anxious  Overwhelmed or sad   Trouble sleeping  Crying uncontrollably  Trouble caring for yourself or baby.  Any thoughts of hurting yourself, your baby, or anyone else    If anything comes up between your visits or you have concerns please don't hesitate to contact us.    Secure access to your medical record:  Use [employer] (secure email communication and access to your chart) to send your primary care provider a message or make an appointment. Ask someone in our office to sign up for [employer]. To log on to [employer] or for more information in [employer] please visit the website at [email or web address].       Certified Nurse Midwife (CNM) Team    Stacey BENITEZ, COLLEEN BENITEZ, COLLEEN Che APRJOHN, COLLEEN, [facility]-BC  Maryam Hernandez DNP, APRN, COLLEEN Cha DNP, APRN, COLLEEN Medrano DNP, APRN, TONIAM    Link to Midwifery Care website: [email or web address]      Again, thank you for choosing the midwives at [facility].  We are excited to be a part of your pregnancy! Please let us know how we can best  partner with you to improve your and your family's health.

## 2025-02-05 LAB
BACTERIA UR CULT: NO GROWTH
RUBV IGG SERPL QL IA: 1.02 INDEX
RUBV IGG SERPL QL IA: POSITIVE
T PALLIDUM AB SER QL: NONREACTIVE
VZV IGG SER QL IA: 4.19 S/CO
VZV IGG SER QL IA: POSITIVE

## 2025-02-12 LAB — SCANNED LAB RESULT: NORMAL

## 2025-03-11 ENCOUNTER — PRENATAL OFFICE VISIT (OUTPATIENT)
Dept: MIDWIFE SERVICES | Facility: CLINIC | Age: 30
End: 2025-03-11
Payer: COMMERCIAL

## 2025-03-11 VITALS — DIASTOLIC BLOOD PRESSURE: 60 MMHG | WEIGHT: 160 LBS | SYSTOLIC BLOOD PRESSURE: 102 MMHG | BODY MASS INDEX: 27.9 KG/M2

## 2025-03-11 DIAGNOSIS — Z3A.15 15 WEEKS GESTATION OF PREGNANCY: ICD-10-CM

## 2025-03-11 DIAGNOSIS — Z34.81 SUPERVISION OF NORMAL INTRAUTERINE PREGNANCY IN MULTIGRAVIDA IN FIRST TRIMESTER: Primary | ICD-10-CM

## 2025-03-11 PROCEDURE — 36415 COLL VENOUS BLD VENIPUNCTURE: CPT

## 2025-03-11 PROCEDURE — 99207 PR PRENATAL VISIT: CPT

## 2025-03-11 PROCEDURE — 3078F DIAST BP <80 MM HG: CPT

## 2025-03-11 PROCEDURE — 0502F SUBSEQUENT PRENATAL CARE: CPT

## 2025-03-11 PROCEDURE — 3074F SYST BP LT 130 MM HG: CPT

## 2025-03-11 PROCEDURE — 99000 SPECIMEN HANDLING OFFICE-LAB: CPT

## 2025-03-11 PROCEDURE — 82105 ALPHA-FETOPROTEIN SERUM: CPT | Mod: 90

## 2025-03-11 NOTE — PATIENT INSTRUCTIONS
"Weeks 14 to 18 of Your Pregnancy: Care Instructions  Around this time, you may start to look pregnant. Your baby is now able to pass urine. And the first stool (meconium) is starting to collect in your baby's intestines. Hair is starting to grow on your baby's head.    You may notice some skin changes, such as itchy spots on your palms or acne on your face.   At your next doctor visit, you may have an ultrasound. So you might think about whether you want to know the sex of your baby. Also ask your doctor about flu and COVID-19 shots.      How to reduce stress   Ask for help when you need it.  Try to avoid things that cause you stress.  Seek out things that relieve stress, such as breathing exercises or yoga.     How to get exercise   If you don't usually exercise, start slowly. Short walks may be a good choice.  Try to be active 30 minutes a day, at least 5 days a week.  Avoid activities where you're more likely to fall.  Use light weights to reduce stress on your joints.     How to stay at a healthy weight for you   Talk to your doctor or midwife about how much weight you should gain.  It's generally best to gain:  About 28 to 40 pounds if you're underweight.  About 25 to 35 pounds if you're at a healthy weight.  About 15 to 25 pounds if you're overweight.  About 11 to 20 pounds if you're very overweight (obese).  Follow-up care is a key part of your treatment and safety. Be sure to make and go to all appointments, and call your doctor if you are having problems. It's also a good idea to know your test results and keep a list of the medicines you take.  Where can you learn more?  Go to https://www.[employer].net/patiented  Enter I453 in the search box to learn more about \"Weeks 14 to 18 of Your Pregnancy: Care Instructions.\"  Current as of: April 30, 2024  Content Version: 14.3    2024 [employer].   Care instructions adapted under license by your healthcare professional. If you have questions about a " medical condition or this instruction, always ask your healthcare professional. [employer] disclaims any warranty or liability for your use of this information.    Second-Trimester Fetal Ultrasound: About This Test  What is it?     Fetal ultrasound is a test that uses sound waves to make pictures of your baby (fetus) and placenta inside the uterus. The test is the safest way to find out the age, size, and position of your baby. You also may be able to find out the sex of your baby. (But the test isn't done just to find out a baby's sex.)  No known risks to the mother or the baby are linked to fetal ultrasound. But you may feel anxious if the test reveals a problem with your pregnancy or baby.  Why is this test done?  In the second trimester, a fetal ultrasound is done to:  Estimate the number of weeks and days a fetus has developed since the beginning of the pregnancy. This is called the gestational age.  Look at the size and position of the fetus, the placenta, and the fluid that surrounds the fetus.  Find major birth defects, such as heart problems or problems with the brain and spinal cord (neural tube defects). But the test may not be able to find many minor defects and some major birth defects.  How do you prepare for the test?  In general, there's nothing you have to do before this test, unless your doctor tells you to.  How is the test done?  You may be able to leave your clothes on, or you will be given a gown to wear.  You will lie on your back on a padded examination table.  A gel will be spread on your belly. It will be removed after the test.  A small, handheld device called a transducer will be pressed against the gel on your skin and moved across your belly several times.  You may watch the monitor to see the picture of your baby during the test.  What happens after the test?  You will probably be able to go home right away.  You most likely will be able to go back to your usual activities  "right away.  Follow-up care is a key part of your treatment and safety. Be sure to make and go to all appointments, and call your doctor if you are having problems. It's also a good idea to keep a list of the medicines you take. Ask your doctor when you can expect to have your test results.  Where can you learn more?  Go to [email or web address]/patiented  Enter Y671 in the search box to learn more about \"Second-Trimester Fetal Ultrasound: About This Test.\"  Current as of: April 30, 2024  Content Version: 14.3    2024 [employer].   Care instructions adapted under license by your healthcare professional. If you have questions about a medical condition or this instruction, always ask your healthcare professional. [employer] disclaims any warranty or liability for your use of this information.    During Pregnancy: Exercises  Introduction  Here are some examples of exercises to do during your pregnancy. Start each exercise slowly. Ease off the exercise if you start to have pain.  Talk to your doctor about when you can start these exercises and which ones will work best for you.  How to do the exercises  Neck rotation    Sit up straight in a firm chair, or stand up straight. If you're standing, keep your feet about hip-width apart.  Keeping your chin level, turn your head to the right and hold for 15 to 30 seconds.  Turn your head to the left and hold for 15 to 30 seconds.  Repeat 2 to 4 times.  Neck stretch to the front    Sit up straight in a firm chair, or stand up straight. Look straight ahead. If you're standing, keep your feet about hip-width apart.  Slowly bend your head forward without moving your shoulders.  Hold for 15 to 30 seconds, then return to your starting position.  Repeat 2 to 4 times.  Back press    Stand with your back 10 to 12 inches away from a wall.  Lean into the wall until your back is against it. Press your lower back against the wall by pulling in your stomach " muscles.  Slowly slide down until your knees are slightly bent, pressing your lower back against the wall.  Hold for at least 6 seconds, then slide back up the wall.  Repeat 8 to 12 times.  Over time, work up to holding this position for as much as 1 minute.  Trunk twist    Sit on the floor with your legs crossed. If that's not comfortable, you can sit on a folded blanket so your bottom is a few inches off the floor. Or you can sit on a chair with your knees hip-width apart and your feet flat on the floor.  Reach your left hand toward your right knee. You can place your right hand at your side for support.  Slowly twist your body (trunk) to your right.  Relax and return to your starting position.  Repeat 2 to 4 times.  Switch your hands and twist to your left.  Repeat 2 to 4 times.  Pelvic rocking on hands and knees    Start on your hands and knees. Place your wrists directly below your shoulders and your knees below your hips.  Breathe in slowly. Tuck your head downward and round your back up, making a curve with your back in the shape of the letter C. Hold this position for about 6 seconds.  Breathe out slowly and bring your head back up. Relax, keeping your back straight. (Don't allow it to curve toward the floor.) Hold for about 6 seconds.  Repeat 8 to 12 times, gently rocking your pelvis.  Pelvic tilt    Lie on your back with your knees bent and your feet flat on the floor.  Tighten your belly muscles by pulling your belly button in toward your spine. Press your lower back to the floor. You should feel your hips and pelvis rock back.  Hold for 6 seconds while breathing smoothly, and then relax.  Repeat 8 to 12 times.  Do this exercise only during the first 4 months of pregnancy. After this point, lying on your back is not recommended, because it can cause blood flow problems for you and your baby.  Backward stretch    Start on your hands and knees with your knees 8 to 10 inches apart, hands directly below your  shoulders, and arms and back straight.  Keeping your arms straight, slowly lower your buttocks toward your heels and tuck your head toward your knees. Hold for 15 to 30 seconds.  Slowly return to the starting position.  Repeat 2 to 4 times.  Forward bend    Sit comfortably in a chair, with your arms relaxed.  Slowly bend forward, allowing your arms to hang down. Lean only as far as you can without feeling discomfort or pressure on your belly.  Hold for 15 to 30 seconds and then slowly sit up straight.  Repeat 2 to 4 times or to your comfort level.  Donkey kick    Start on your hands and knees. Place your hands directly below your shoulders, and keep your arms straight.  Tighten your belly muscles by pulling your belly button in toward your spine. Keep breathing normally, and don't hold your breath.  Lift one knee and bring it toward your elbow.  Slowly extend that leg behind you without completely straightening it. Be careful not to let your hip drop down. Avoid arching your back.  Hold your leg behind you for about 6 seconds.  Return to your starting position.  Repeat 8 to 12 times for each leg.  Tailor sitting    Sit on the floor.  Bring your feet close to your body while crossing your ankles.  Keep your back straight. Relax your legs and let your knees drop toward the floor.  Hold this position for as long as you are comfortable.  Toe reach    Sit on the floor with your back straight, legs about 12 inches apart, and feet relaxed outward.  Stretch your hands forward toward your right foot, then sit up.  Stretch your hands straight forward, then sit up.  Stretch your hands forward toward your left foot, then sit up.  Hold each stretch for 15 to 30 seconds.  Repeat 2 to 4 times.  Follow-up care is a key part of your treatment and safety. Be sure to make and go to all appointments, and call your doctor if you are having problems. It's also a good idea to know your test results and keep a list of the medicines you  take.  Current as of: April 30, 2024  Content Version: 14.3    2024 [employer].   Care instructions adapted under license by your healthcare professional. If you have questions about a medical condition or this instruction, always ask your healthcare professional. [employer] disclaims any warranty or liability for your use of this information.

## 2025-03-11 NOTE — PROGRESS NOTES
15w5d    Feels well overall. Here with Jaquelin today  Fetal movement: not present  Denies loss of fluid/vb/contractions/pelvic pain  Mood: good  AFP: requested  Declines GC/Chlamydia urine collection today  Anatomy ultrasound next visit between 19-21 weeks, order placed.    Return to clinic 4 weeks    EMMANUEL Blakely, TONIAM

## 2025-03-13 LAB
# FETUSES US: NORMAL
AFP MOM SERPL: 0.92
AFP SERPL-MCNC: 29 NG/ML
AGE - REPORTED: 29.7 YR
CURRENT SMOKER: NO
FAMILY MEMBER DISEASES HX: NO
GA METHOD: NORMAL
GA: NORMAL WK
IDDM PATIENT QL: NO
INTEGRATED SCN PATIENT-IMP: NORMAL
SPECIMEN DRAWN SERPL: NORMAL

## 2025-04-15 NOTE — PROGRESS NOTES
20w5d  Feels well overall. Here with Juan and daughter today. Noticed pelvic floor weakness which is making constipation worse, using miralax, this is her norm  Fetal movement: present  Denies loss of fluid/vb/contractions  Anatomy ultrasound results discussed:  EFW 15 oz 415g/ 69%ile, Placenta:  anterior  Growth appropriate for gestation.  If recommendations are made that have not been discussed, we will contact her. Final report sent via [employer]    Patient Active Problem List   Diagnosis    PSVT (paroxysmal supraventricular tachycardia)    Supervision of normal intrauterine pregnancy in multigravida     GCT visit between 24-28 weeks  Round ligament pain and comfort measures reviewed  Discussed using squatty potty  Will place Pelvic floor physical therapy referral PP  Return to clinic 4 weeks    EMMANUEL Levin, COLLEEN

## 2025-04-16 ENCOUNTER — PRENATAL OFFICE VISIT (OUTPATIENT)
Dept: MIDWIFE SERVICES | Facility: CLINIC | Age: 30
End: 2025-04-16
Payer: COMMERCIAL

## 2025-04-16 ENCOUNTER — ANCILLARY PROCEDURE (OUTPATIENT)
Dept: ULTRASOUND IMAGING | Facility: CLINIC | Age: 30
End: 2025-04-16
Payer: COMMERCIAL

## 2025-04-16 VITALS — BODY MASS INDEX: 28.77 KG/M2 | DIASTOLIC BLOOD PRESSURE: 64 MMHG | SYSTOLIC BLOOD PRESSURE: 108 MMHG | WEIGHT: 165 LBS

## 2025-04-16 DIAGNOSIS — Z34.82 SUPERVISION OF NORMAL INTRAUTERINE PREGNANCY IN MULTIGRAVIDA IN SECOND TRIMESTER: Primary | ICD-10-CM

## 2025-04-16 DIAGNOSIS — Z34.81 SUPERVISION OF NORMAL INTRAUTERINE PREGNANCY IN MULTIGRAVIDA IN FIRST TRIMESTER: ICD-10-CM

## 2025-04-16 PROCEDURE — 76805 OB US >/= 14 WKS SNGL FETUS: CPT | Performed by: OBSTETRICS & GYNECOLOGY

## 2025-04-16 PROCEDURE — 99207 PR PRENATAL VISIT: CPT | Performed by: ADVANCED PRACTICE MIDWIFE

## 2025-04-16 PROCEDURE — 3078F DIAST BP <80 MM HG: CPT | Performed by: ADVANCED PRACTICE MIDWIFE

## 2025-04-16 PROCEDURE — 0502F SUBSEQUENT PRENATAL CARE: CPT | Performed by: ADVANCED PRACTICE MIDWIFE

## 2025-04-16 PROCEDURE — 3074F SYST BP LT 130 MM HG: CPT | Performed by: ADVANCED PRACTICE MIDWIFE

## 2025-04-16 NOTE — PATIENT INSTRUCTIONS
"Weeks 18 to 22 of Your Pregnancy: Care Instructions  At this stage you may find that your nausea and fatigue are gone. You may feel better overall and have more energy. But you might now also have some new discomforts, like sleep problems or leg cramps.    You may start to feel your baby move. These movements can feel like butterflies or bubbles.   Babies at this stage can now suck their thumbs.     Get some exercise every day.  And avoid caffeine late in the day.     Take a warm shower or bath before bed.  Try relaxation exercises to calm your mind and body.     Use extra pillows.  They can help you get comfortable.     Don't use sleeping pills or alcohol.  They could harm your baby.     For leg cramps, stretch and apply heat.  A warm bath, leg warmers, a heating pad, or a hot water bottle can help with muscle aches.   Stretches for leg cramps    Straighten your leg and bend your foot (flex your ankle) slowly upward, toward your knee. Bend your toes up and down.   Stand on a flat surface. Stretch your toes upward. For balance, hold on to the wall or something stable. If it feels okay, take small steps walking on your heels.   Follow-up care is a key part of your treatment and safety. Be sure to make and go to all appointments, and call your doctor if you are having problems. It's also a good idea to know your test results and keep a list of the medicines you take.  Where can you learn more?  Go to https://www.[employer].net/patiented  Enter W603 in the search box to learn more about \"Weeks 18 to 22 of Your Pregnancy: Care Instructions.\"  Current as of: April 30, 2024  Content Version: 14.4    5643-3696 [employer].   Care instructions adapted under license by your healthcare professional. If you have questions about a medical condition or this instruction, always ask your healthcare professional. [employer] disclaims any warranty or liability for your use of this information.    Round " Ligament Pain: Care Instructions  Overview     Round ligament pain is a common pain during pregnancy. You may feel a sharp brief pain on one or both sides of your belly. It may go down into your groin. It's usually felt for the first time during the second trimester. This pain is a normal part of pregnancy.  Your uterus is supported by two ligaments that go from the top and sides of the uterus to the bones of the pelvis. These are the round ligaments. As your uterus grows, these ligaments stretch and tighten with your movements. This may be the cause of the pain. You may find that certain activities seem to cause pain. If you can, avoid those activities.  Your doctor can usually diagnose round ligament pain from your symptoms and an exam. If you have bleeding or other symptoms, your doctor may also do an imaging test, such as an ultrasound. Your doctor may suggest some things that can help the pain, such as rest and strengthening exercises.  Follow-up care is a key part of your treatment and safety. Be sure to make and go to all appointments, and call your doctor if you are having problems. It's also a good idea to know your test results and keep a list of the medicines you take.  How can you care for yourself at home?  If certain movements seem to trigger belly pain, see if you can avoid them or try moving more slowly so the ligaments don't stretch quickly.  Stay active. If your doctor says it's okay, try moderate exercise. You might try things like swimming, walking, or stretching. Ask your doctor about strengthening and stretching exercises that may help.  Try a heating pad or cold pack on the area. A warm bath or shower may also help.  Rest when you can.  Ask your doctor about taking acetaminophen for pain. Be safe with medicines. Read and follow all instructions on the label.  Try a belly support band. Some people find that these can help.  When should you call for help?   Call your doctor now or seek immediate  "medical care if:    You think you might be in labor.     You have new or worse pain.   Watch closely for changes in your health, and be sure to contact your doctor if you have any problems.  Where can you learn more?  Go to https://www.[employer].net/patiented  Enter R110 in the search box to learn more about \"Round Ligament Pain: Care Instructions.\"  Current as of: April 30, 2024  Content Version: 14.4    7194-2409 [employer].   Care instructions adapted under license by your healthcare professional. If you have questions about a medical condition or this instruction, always ask your healthcare professional. [employer] disclaims any warranty or liability for your use of this information.    Learning About Screening for Gestational Diabetes  What is gestational diabetes screening?     Screening for gestational diabetes is a way to look for high blood sugar during pregnancy. You drink some very sweet liquid. Then you have a blood test to see how your body uses sugar (glucose).  How is gestational diabetes screening done?  Screening for gestational diabetes may be done in a couple of ways.  Two-part screening.  Part one (glucose challenge test): A blood sample is taken after you drink a liquid that contains sugar (glucose). You don't need to stop eating or drinking before this test. If the test shows that you don't have a lot of sugar in your blood, you don't have gestational diabetes.  Part two (oral glucose tolerance test, or OGTT): If the first test shows a lot of sugar in your blood, then you may have an OGTT. You can't eat or drink for at least 8 hours before this test. A blood sample is taken, then you drink a sweet liquid. You have more blood tests after 1 to 3 hours. If the OGTT shows that you have a lot of sugar in your blood, you may have gestational diabetes.  One-part screening.  Sometimes doctors use the OGTT on its own. If the test shows that you don't have a lot of sugar in your " "blood, you don't have gestational diabetes. If you do have a lot of sugar in your blood, you may have the condition.  What are the risks of screening?  Your blood glucose level may drop very low toward the end of the test. If this happens, you may feel weak, hungry, and restless. Tell your doctor if you have these symptoms. The test usually will be stopped.  You may vomit after drinking the sweet liquid. If this happens, you may need to take the test at a later time.  Your doctor may do more glucose tests at other times during your pregnancy.  Follow-up care is a key part of your treatment and safety. Be sure to make and go to all appointments, and call your doctor if you are having problems. It's also a good idea to know your test results and keep a list of the medicines you take.  Where can you learn more?  Go to https://www.[employer].net/patiented  Enter A472 in the search box to learn more about \"Learning About Screening for Gestational Diabetes.\"  Current as of: April 30, 2024  Content Version: 14.4    1733-6261 [employer].   Care instructions adapted under license by your healthcare professional. If you have questions about a medical condition or this instruction, always ask your healthcare professional. [employer] disclaims any warranty or liability for your use of this information.    "

## 2025-05-08 ENCOUNTER — LAB (OUTPATIENT)
Dept: LAB | Facility: CLINIC | Age: 30
End: 2025-05-08
Payer: COMMERCIAL

## 2025-05-08 ENCOUNTER — RESULTS FOLLOW-UP (OUTPATIENT)
Dept: OBGYN | Facility: CLINIC | Age: 30
End: 2025-05-08

## 2025-05-08 ENCOUNTER — PRENATAL OFFICE VISIT (OUTPATIENT)
Dept: MIDWIFE SERVICES | Facility: CLINIC | Age: 30
End: 2025-05-08
Attending: ADVANCED PRACTICE MIDWIFE
Payer: COMMERCIAL

## 2025-05-08 VITALS — DIASTOLIC BLOOD PRESSURE: 64 MMHG | WEIGHT: 169 LBS | SYSTOLIC BLOOD PRESSURE: 118 MMHG | BODY MASS INDEX: 29.47 KG/M2

## 2025-05-08 DIAGNOSIS — Z34.81 SUPERVISION OF NORMAL INTRAUTERINE PREGNANCY IN MULTIGRAVIDA IN FIRST TRIMESTER: ICD-10-CM

## 2025-05-08 DIAGNOSIS — Z34.81 SUPERVISION OF NORMAL INTRAUTERINE PREGNANCY IN MULTIGRAVIDA IN FIRST TRIMESTER: Primary | ICD-10-CM

## 2025-05-08 LAB
ERYTHROCYTE [DISTWIDTH] IN BLOOD BY AUTOMATED COUNT: 13.2 % (ref 10–15)
GLUCOSE 1H P 50 G GLC PO SERPL-MCNC: 100 MG/DL (ref 70–129)
HCT VFR BLD AUTO: 38.5 % (ref 35–47)
HGB BLD-MCNC: 12.7 G/DL (ref 11.7–15.7)
MCH RBC QN AUTO: 30.5 PG (ref 26.5–33)
MCHC RBC AUTO-ENTMCNC: 33 G/DL (ref 31.5–36.5)
MCV RBC AUTO: 92 FL (ref 78–100)
PLATELET # BLD AUTO: 302 10E3/UL (ref 150–450)
RBC # BLD AUTO: 4.17 10E6/UL (ref 3.8–5.2)
WBC # BLD AUTO: 13.5 10E3/UL (ref 4–11)

## 2025-05-08 NOTE — PROGRESS NOTES
24w0d   Feels well overall. Here alone today.   Fetal movement: present   Denies loss of fluid/vb/contractions, signs and symptoms preeclampsia    GCT, CBC drawn today    Patient Active Problem List   Diagnosis    PSVT (paroxysmal supraventricular tachycardia)    Supervision of normal intrauterine pregnancy in multigravida       Tdap next visit; reviewed CDC recommendations and partner/family vaccination recommended as well  Need for Rhogam: no  Counseled on signs and symptoms of PTL, preeclampsia, discussed fetal movement awareness    Return to clinic 4 weeks    Maryam Hernandez, ONEYDA, APRN, CNM

## 2025-06-04 NOTE — PROGRESS NOTES
28w0d  Feels good overall. Having some reflux but managing well with Pepcid and Tums.  Here with her daughter Renae today.   Fetal movement: present  Denies loss of fluid/vb/contractions, signs and symptoms preeclampsia    Plans for childbirth education: No    Tdap: Given  Water birth information reviewed: Yes, open to a water birth but not set on it.     Reviewed PTL precautions, S&S of preeclampsia and fetal movement awareness, patient verbalizes understanding and what to report  Return to clinic 2 weeks    Opal [employer] student    I was present with the student who participated in the service and in the documentation of the note. I have verified the history and personally performed the physical exam and medical decision-making. I agree with the assessment and plan of care as documented in the note.      Maryam Hernandez, ONEYDA, APRN, CNM

## 2025-06-04 NOTE — PATIENT INSTRUCTIONS
Childbirth Education    There are many options for childbirth education in the [place] area. Additionally, there are many online options in other areas in the [place], so please if you find great classes, please let us know!    Nancy: [facility] is a boutique type center that is know for its prenatal/  yoga and community like setting. They offer great childbirth education. They are expensive, but people who go there love it. www.[employer]    Mari: Mari offers well-rounded education including preparing for childbirth, breastfeeding, and even baby CPR. www.[employer].Knotice    [facility]: Birth offers similar classes as [place] at similar prices. They focus more on Lamaze style childbirth techniques. www.birthedmn.com    [facility]: I cannot recommend this place enough!!! [employer] mission is to provide high quality prenatal/  education to all [facility] residents. They provide  support, prenatal yoga, childbirth and breastfeeding education. They are a nonprofit organization. For people with Medicaid health care plans, most services are free. But even if you have private insurance, the costs are very reasonable. Plus, the money is funneled back into amazing childbirth education for all! www.[facility]-mir[facility].org    [facility]: [facility] offers free parent topic sessions. These sessions are not comprehensive but can give small doses of education. www.[facility]collect[facility].org      Weeks 26 to 30 of Your Pregnancy: Care Instructions  You're starting your last trimester. You'll probably feel your baby moving around more. Your back may ache as your body gets used to your baby's size and length. Take care of yourself, and pay attention to what your body needs.    Talk to your doctor about getting the Tdap shot. It will help protect your  against whooping cough (pertussis). Also ask your doctor about flu and COVID-19 shots if you haven't had them yet. If your  blood type is Rh negative, you may be given a shot of Rh immune globulin (such as RhoGAM). It can help prevent problems for your baby.   You may have Serg-Cardona contractions. They are single or several strong contractions without a pattern. These are practice contractions but not the start of labor.   Be kind to yourself.       Take breaks when you're tired.  Change positions often. Don't sit for too long or stand for too long.  At work, rest during breaks if you can. If you don't get breaks, talk to your doctor about writing a letter to your employer to request them.  Avoid fumes, chemicals, and tobacco smoke.  Be sexual if you want to.       You may be interested in sex, or you may not. Everyone is different.  Sex is okay unless your doctor tells you not to.  Your belly can make it hard to find good positions for sex. [place] and explore.  Watch for signs of  labor.        These signs include:  Menstrual-like cramps. Or you may have pain or pressure in your pelvis that happens in a pattern.  About 6 or more contractions in an hour (even after rest and a glass of water).  A low, dull backache that doesn't go away when you change positions.  An increase or change in vaginal discharge.  Light vaginal bleeding or spotting.  Your water breaking.  Know what to do if you think you are having contractions.       Drink 1 or 2 glasses of water.  Lie down on your left side for at least an hour.  While on your side, feel the top of your belly to see if it's tight.  Write down your contractions for an hour. Time how long it is from the start of one contraction to the start of the next.  Call your doctor if you have regular contractions.  Follow-up care is a key part of your treatment and safety. Be sure to make and go to all appointments, and call your doctor if you are having problems. It's also a good idea to know your test results and keep a list of the medicines you take.  Where can you learn more?  Go to  "https://www.[employer].net/patiented  Enter S999 in the search box to learn more about \"Weeks 26 to 30 of Your Pregnancy: Care Instructions.\"  Current as of: April 30, 2024  Content Version: 14.4    0689-2586 [employer].   Care instructions adapted under license by your healthcare professional. If you have questions about a medical condition or this instruction, always ask your healthcare professional. [employer] disclaims any warranty or liability for your use of this information.    Counting Your Baby's Kicks: Care Instructions  Overview     Counting your baby's kicks is one way your doctor can tell that your baby is healthy. You will probably feel your baby move for the first time between 16 and 22 weeks. The movement may feel like flutters rather than kicks. Your baby may move more at certain times of the day. When you are active, you may notice less kicking than when you are resting. At your prenatal visits, your doctor will ask whether the baby is active.  In your last trimester, your doctor may ask you to count the number of times you feel your baby move.  Follow-up care is a key part of your treatment and safety. Be sure to make and go to all appointments, and call your doctor if you are having problems. It's also a good idea to know your test results and keep a list of the medicines you take.  How do you count fetal kicks?  A common method of checking your baby's movement is to note the length of time it takes to count 10 movements (such as kicks, flutters, or rolls).  Pick your baby's most active time of day to count. This may be any time from morning to evening.  If you don't feel 10 movements in an hour, have something to eat or drink and count for another hour. If you don't feel at least 10 movements in the 2-hour period, call your doctor.  Do not use an at-home Doppler heart monitor in place of counting fetal movements.  When should you call for help?   Call your doctor now or " "seek immediate medical care if:    You feel fewer than 10 movements in a 2-hour period.     You noticed that your baby has stopped moving or is moving less than normal.   Watch closely for changes in your health, and be sure to contact your doctor if you have any problems.  Where can you learn more?  Go to https://www.[employer].net/patiented  Enter U048 in the search box to learn more about \"Counting Your Baby's Kicks: Care Instructions.\"  Current as of: April 30, 2024  Content Version: 14.4    7043-0224 [employer].   Care instructions adapted under license by your healthcare professional. If you have questions about a medical condition or this instruction, always ask your healthcare professional. [employer] disclaims any warranty or liability for your use of this information.      "

## 2025-06-05 ENCOUNTER — PRENATAL OFFICE VISIT (OUTPATIENT)
Dept: MIDWIFE SERVICES | Facility: CLINIC | Age: 30
End: 2025-06-05
Payer: COMMERCIAL

## 2025-06-05 VITALS
DIASTOLIC BLOOD PRESSURE: 58 MMHG | HEIGHT: 64 IN | BODY MASS INDEX: 29.4 KG/M2 | SYSTOLIC BLOOD PRESSURE: 110 MMHG | WEIGHT: 172.2 LBS

## 2025-06-05 DIAGNOSIS — Z23 NEED FOR DIPHTHERIA-TETANUS-PERTUSSIS (TDAP) VACCINE: ICD-10-CM

## 2025-06-05 DIAGNOSIS — Z34.83 SUPERVISION OF NORMAL INTRAUTERINE PREGNANCY IN MULTIGRAVIDA IN THIRD TRIMESTER: Primary | ICD-10-CM

## 2025-06-23 ENCOUNTER — PRENATAL OFFICE VISIT (OUTPATIENT)
Dept: MIDWIFE SERVICES | Facility: CLINIC | Age: 30
End: 2025-06-23
Payer: COMMERCIAL

## 2025-06-23 VITALS
BODY MASS INDEX: 30.87 KG/M2 | WEIGHT: 174.2 LBS | SYSTOLIC BLOOD PRESSURE: 112 MMHG | DIASTOLIC BLOOD PRESSURE: 54 MMHG | HEIGHT: 63 IN

## 2025-06-23 DIAGNOSIS — R12 HEARTBURN: ICD-10-CM

## 2025-06-23 DIAGNOSIS — Z34.83 SUPERVISION OF NORMAL INTRAUTERINE PREGNANCY IN MULTIGRAVIDA IN THIRD TRIMESTER: Primary | ICD-10-CM

## 2025-06-23 PROCEDURE — 0502F SUBSEQUENT PRENATAL CARE: CPT | Performed by: NURSE PRACTITIONER

## 2025-06-23 PROCEDURE — 99207 PR PRENATAL VISIT: CPT | Performed by: NURSE PRACTITIONER

## 2025-06-23 PROCEDURE — 3078F DIAST BP <80 MM HG: CPT | Performed by: NURSE PRACTITIONER

## 2025-06-23 PROCEDURE — 3074F SYST BP LT 130 MM HG: CPT | Performed by: NURSE PRACTITIONER

## 2025-06-23 NOTE — PROGRESS NOTES
30w4d  Feels good overall.  Here with Renae today.   Having a lot of heart burn, taking Pepcid in the morning and eating small meals and not right before bed.   Encouraged papaya enzymes and raw almonds.   Declined the need for omeprazole at this time.     Fetal Movement: present  Denies loss of fluid/vb and s/sx of preeclampsia  Contractions: none, some BH.    Fetal kick counts reviewed and handout given  Reviewed PTL precautions, s/sx of preeclampsia, fetal movement counts    Need for growth U/S at next visit: No    Problems: heartburn, managing with Pepcid.  TWlbs    Return to clinic in 2 weeks    Opal [employer] student    I was present with the student who participated in the service and in the documentation of the note. I have verified the history and personally performed the physical exam and medical decision-making. I agree with the assessment and plan of care as documented in the note.     Laurie BENITEZ, COLLEEN, SILVA-BC  [phone]

## 2025-06-23 NOTE — PATIENT INSTRUCTIONS
HEARTBURN PREVENTION    Heartburn can be incredibly uncomfortable and bothersome. It can worsen with pregnancy, certain medications, and other illnesses. Try the lifestyle tips and remedies to combat your symptoms. We encourage you to try these first before using medications.    Small frequent meals    Eat slowly     Separate eating solid foods from liquids by 30-60 minutes    Do not eat within 2 hour of going to sleep    Avoid going to sleep right after eating or prop yourself up on pillows to avoid being completely flat    Avoid acidic foods like tomatoes, spicy foods, caffeine, chocolate, peppermint, alcohol, sugar, white bread rice, and pasta (whole grains are easier to digest)    Chew gum or have a throat lozenge before eating to encourage salivation, saliva neutralizes stomach acid     Avoid tobacco and alcohol    Weight loss may also be helpful if you are overweight      NATURAL REMEDIES FOR HEARTBURN  You should avoid overuse of standard over the counter (OTC) antacids since they neutralize hydrochloric acid and arrest digestion.  Neutralized stomach acids later rebound with an increased acid secretion after the antacid wears off, which in turn produces even more stomach acid.  Prolonged use of antacids can lead to diarrhea and constipation with nausea.  Additionally, brucellae, a beneficial bacteria found in some dairy products, is killed by antacids.  It is better to try these natural remedies first.     Papaya tablets, papaya or papaya leaf tea    Fennel seeds, cumin seeds, anise seeds, and dill seed, can be eaten alone or made into tea    Essential oils to massage into the upper abdomen chamomile, fennel, lemon balm, puja sandalwood                                                    Sip a small amount of apple cider vinegar with warm water    Raw almonds    Drink a glass of milk before bed    Supplement with digestive enzymes (can be found in most drug/health food) stores  Plain baked  potatoes  Comfrey-pepsin tablets (digestive aid)  Eat raw apple peels  Medications    If natural remedies and lifestyle changes fail consider using OTC medications. Many OTC medications are available for heartburn and are often very effective.    Antacids like Tums, Mylanta, Maalox   Zantac/Pepcid  Prilosec     Most OTC medications are recommended for short term use only, if you continue to have issues despite all of these helpful methods we recommend you be seen by a primary care provider for evaluation.    For questions or concerns please call:    [facility]   [phone]    Weeks 30 to 32 of Your Pregnancy: Care Instructions  Your baby is growing more every day. Its eyes can open and close, and it may have hair on its head. Your baby may sleep 20 to 45 minutes at a time and is more active at certain times.    You should feel your baby move several times every day. Your baby now turns less and kicks more.   This is a good time to tour your hospital or birthing center. You may also want to find childcare if needed.         To ease heartburn   Avoid foods that make your symptoms worse, such as chocolate, spicy foods, and caffeine.  Avoid bending over or lying down after meals.  Do not eat for 2 hours before bedtime.  Take antacids like Tums, but don't take ones that have sodium bicarbonate, magnesium trisilicate, or aspirin.        To care for large, swollen veins (varicose veins)   Try to avoid standing for long periods of time.  Sit with your feet propped up.  Wear support hose.  Get some exercise every day, like walking or swimming.  Counting your baby's kicks  Your doctor may ask you to count your baby's movements, such as kicks, flutters, or rolls.    Find a quiet place, and get comfortable. Write down your start time. Count your baby's movements (except hiccups). When your baby has moved 10 times, you can stop counting. Write down how many minutes it took.   If an hour goes by and you  "don't feel 10 movements, have something to eat or drink. Count for another hour. If you don't feel at least 10 movements in the 2-hour period, call your doctor.   Follow-up care is a key part of your treatment and safety. Be sure to make and go to all appointments, and call your doctor if you are having problems. It's also a good idea to know your test results and keep a list of the medicines you take.  Where can you learn more?  Go to https://www.[employer].net/patiented  Enter X471 in the search box to learn more about \"Weeks 30 to 32 of Your Pregnancy: Care Instructions.\"  Current as of: April 30, 2024  Content Version: 14.5 2024-2025 [employer].   Care instructions adapted under license by your healthcare professional. If you have questions about a medical condition or this instruction, always ask your healthcare professional. [employer] disclaims any warranty or liability for your use of this information.    "

## 2025-07-09 NOTE — PROGRESS NOTES
33w0d  Feels tired overall. Here with Juan today.  Fetal Movement: present   Denies loss of fluid/vb & s/sx of preeclampsia.   Contractions: resolves with rest and hydration  Have car seat: Yes  Discussed GBS screen at next visit    Problems: heartburn, managing with Pepcid.  TWlbs    Fetal kick counts/movements reviewed  Reviewed signs and symptoms PTL and s/sx of preeclampsia; denies any S&S and aware of what to report  Birth preferences: desires option for WB  Return to clinic 2 weeks    Laurie BENITEZ, COLLEEN, [facility]-BC  [phone]

## 2025-07-10 ENCOUNTER — PRENATAL OFFICE VISIT (OUTPATIENT)
Dept: MIDWIFE SERVICES | Facility: CLINIC | Age: 30
End: 2025-07-10
Payer: COMMERCIAL

## 2025-07-10 VITALS
WEIGHT: 175.2 LBS | DIASTOLIC BLOOD PRESSURE: 54 MMHG | BODY MASS INDEX: 31.04 KG/M2 | SYSTOLIC BLOOD PRESSURE: 108 MMHG | HEIGHT: 63 IN

## 2025-07-10 DIAGNOSIS — Z34.83 SUPERVISION OF NORMAL INTRAUTERINE PREGNANCY IN MULTIGRAVIDA IN THIRD TRIMESTER: Primary | ICD-10-CM

## 2025-07-10 NOTE — PATIENT INSTRUCTIONS
Weeks 32 to 34 of Your Pregnancy: Care Instructions    Decide whether you want to bank or donate your baby's umbilical cord blood. If you want to save this blood, you have to arrange for it ahead of time.   Decide about circumcision. Personal, Druze, or cultural beliefs may play a role in your decision. You get to decide what you want for your baby.   Tips for weeks 32 to 34 of pregnancy  Learn how to ease hemorrhoids.       Get more liquids, fruits, vegetables, and fiber in your diet.  Avoid sitting for too long.  Clean yourself with moist toilet paper. Or try witch hazel pads.  Try ice packs or warm sitz baths for discomfort.  Use hydrocortisone cream for pain or itching.  Ask your doctor about stool softeners.  Consider the benefits of breastfeeding.       It reduces your baby's risk of sudden infant death syndrome (SIDS).   babies are less likely to get certain infections. And they're less likely to be obese or get diabetes later in life.  It can lower your risk of breast and ovarian cancers and osteoporosis.  It saves you money.  Follow-up care is a key part of your treatment and safety. Be sure to make and go to all appointments, and call your doctor if you are having problems. It's also a good idea to know your test results and keep a list of the medicines you take.  When should you call for help?  Call 911 anytime you think you may need emergency care. For example, call if:  You have severe vaginal bleeding. You have soaked through one or more pads in an hour, and the bleeding is not slowing down.  You have sudden, severe pain in your belly that does not go away.  You have chest pain, are short of breath, or cough up blood.  You passed out (lost consciousness).  You have a seizure.  You see or feel the umbilical cord.  You think you are about to deliver your baby and can't make it safely to the hospital or birthing center.  Call your doctor now or seek immediate medical care if:  You have  "vaginal bleeding.  You have belly pain.  You have a fever.  You are dizzy or lightheaded, or you feel like you may faint.  You have signs of a blood clot in your leg (called a deep vein thrombosis), such as:  Pain in the calf, back of the knee, thigh, or groin.  Swelling in your leg or groin.  A color change on the leg or groin. The skin may be reddish or purplish.  You have symptoms of preeclampsia, such as:  Sudden swelling of your face, hands, or feet.  New vision problems (such as dimness, blurring, or seeing spots).  A severe headache that will not go away.  You have a sudden release or slow trickle of fluid from your vagina. This may mean your water has broken.  You've been having regular contractions for an hour. This means that you've had at least 6 contractions within 1 hour, even after you change your position and drink fluids.  You notice that your baby has stopped moving or is moving less than normal.  You have symptoms of a urinary tract infection. These may include:  Pain or burning when you urinate.  A frequent need to urinate without being able to pass much urine.  Pain in your low back (below the rib cage and above the waist).  Blood in your urine.  Watch closely for changes in your health, and be sure to contact your doctor if:  You have vaginal discharge that smells bad.  You feel sad, anxious, or hopeless for more than a few days.  You have skin changes, such as a rash, itching, or a yellow color to your skin.  You have other concerns about your pregnancy.  Where can you learn more?  Go to https://www.[employer].net/patiented  Enter X711 in the search box to learn more about \"Weeks 32 to 34 of Your Pregnancy: Care Instructions.\"  Current as of: April 30, 2024  Content Version: 14.5    2126-5435 [employer].   Care instructions adapted under license by your healthcare professional. If you have questions about a medical condition or this instruction, always ask your healthcare " professional. [facility] [employer], [facility] disclaims any warranty or liability for your use of this information.

## 2025-07-17 ENCOUNTER — PRENATAL OFFICE VISIT (OUTPATIENT)
Dept: MIDWIFE SERVICES | Facility: CLINIC | Age: 30
End: 2025-07-17
Payer: COMMERCIAL

## 2025-07-17 ENCOUNTER — MEDICAL CORRESPONDENCE (OUTPATIENT)
Dept: HEALTH INFORMATION MANAGEMENT | Facility: CLINIC | Age: 30
End: 2025-07-17

## 2025-07-17 VITALS — WEIGHT: 175 LBS | DIASTOLIC BLOOD PRESSURE: 60 MMHG | BODY MASS INDEX: 31 KG/M2 | SYSTOLIC BLOOD PRESSURE: 122 MMHG

## 2025-07-17 DIAGNOSIS — Z34.83 SUPERVISION OF NORMAL INTRAUTERINE PREGNANCY IN MULTIGRAVIDA IN THIRD TRIMESTER: Primary | ICD-10-CM

## 2025-07-17 NOTE — PATIENT INSTRUCTIONS
Weeks 34 to 36 of Your Pregnancy: Care Instructions  Your belly has grown quite large. It's almost time to give birth! Your baby's lungs are almost ready to breathe air. The skull bones are firm enough to protect your baby's head. But they're soft enough to move down through the birth canal.    You might be wondering what to expect during labor. Because each birth is different, there's no way to know exactly what childbirth will be like for you. Talk to your doctor or midwife about any concerns you have.   You'll probably have a test for group B streptococcus (GBS). GBS is bacteria that can live in the vagina and rectum. GBS can make your baby sick after birth. If you test positive, you'll get antibiotics during labor.     Choose what type of pain relief you would like during labor.  You can choose from a few types, including medicine and non-medicine options. You may want to use several types of pain relief.     Know how medicines can help with pain during labor.  Some medicines lower anxiety and help with some of the pain. Others make your lower body numb so that you will feel less pain.     Tell your doctor about your pain medicine choice.  Do this before you start labor or very early in your labor. You may be able to change your mind during labor.     Learn about the stages of labor.    The first stage includes the early (latent) and active phases of labor. Contractions start in early labor. During active labor, contractions get stronger, last longer, and happen more often. And the cervix opens more rapidly.  The second stage starts when you're ready to push. During this stage, your baby is born.  During the third stage, you'll usually have a few more contractions to push out the placenta.   Follow-up care is a key part of your treatment and safety. Be sure to make and go to all appointments, and call your doctor if you are having problems. It's also a good idea to know your test results and keep a list of the  "medicines you take.  Where can you learn more?  Go to https://www.UmbaBox.net/patiented  Enter B912 in the search box to learn more about \"Weeks 34 to 36 of Your Pregnancy: Care Instructions.\"  Current as of: 2024  Content Version: . [employer].   Care instructions adapted under license by your healthcare professional. If you have questions about a medical condition or this instruction, always ask your healthcare professional. [employer] disclaims any warranty or liability for your use of this information.    Group B Strep During Pregnancy: Care Instructions  Overview     Group B strep infection is caused by a type of bacteria. It's a different kind of bacteria than the kind that causes strep throat.  You may have this kind of bacteria in your body. Sometimes it may cause an infection, but most of the time it doesn't make you sick or cause symptoms. But if you pass the bacteria to your baby during the birth, it can cause serious health problems for your baby.  If you have this bacteria in your body, you will get antibiotics when you are in labor. Antibiotics help prevent problems for a  baby.  After birth, doctors will watch and may test your baby. If your baby tests positive for Group B strep, your baby will get antibiotics.  If you plan to breastfeed your baby, don't worry. It will be safe to breastfeed.  Follow-up care is a key part of your treatment and safety. Be sure to make and go to all appointments, and call your doctor if you are having problems. It's also a good idea to know your test results and keep a list of the medicines you take.  How can you care for yourself at home?  If your doctor has prescribed antibiotics, take them as directed. Do not stop taking them just because you feel better. You need to take the full course of antibiotics.  Tell your doctor if you are allergic to any antibiotic.  If you go into labor, or your water breaks, go to " "the hospital. Your doctor will give you antibiotics to help protect your baby from infection.  Tell the doctors and nurses if you have an allergy to penicillin.  Tell the doctors and nurses at the hospital that you tested positive for group B strep.  When should you call for help?   Call your doctor now or seek immediate medical care if:    You have symptoms of a urinary tract infection. These may include:  Pain or burning when you urinate.  A frequent need to urinate without being able to pass much urine.  Pain in the flank, which is just below the rib cage and above the waist on either side of the back.  Blood in your urine.  A fever.     You think you are in labor or your water has broken.     You have pain in your belly or pelvis.   Watch closely for changes in your health, and be sure to contact your doctor if you have any problems.  Where can you learn more?  Go to [email or web address]/patiented  Enter M001 in the search box to learn more about \"Group B Strep During Pregnancy: Care Instructions.\"  Current as of: April 30, 2024  Content Version: 14.5    8247-0034 [employer].   Care instructions adapted under license by your healthcare professional. If you have questions about a medical condition or this instruction, always ask your healthcare professional. [employer] disclaims any warranty or liability for your use of this information.    Circumcision in Infants: What to Expect at Home  Your Child's Recovery  After circumcision, your baby's penis may look red and swollen. It may have petroleum jelly and gauze on it. The gauze will likely come off when your baby urinates. Follow your doctor's directions about whether to put clean gauze back on your baby's penis or to leave the gauze off. If you need to remove gauze from the penis, use warm water to soak the gauze and gently loosen it.  The doctor may have used a Plastibell device to do the circumcision. If so, your baby will have a " plastic ring around the head of the penis. The ring should fall off by itself in 10 to 12 days.  A thin, yellow film may form over the area the day after the procedure. This is part of the normal healing process. It should go away in a few days.  Your baby may seem fussy while the area heals. It may hurt for your baby to urinate. This pain often gets better in 3 or 4 days. But it may last for up to 2 weeks.  Even though your baby's penis will likely start to feel better after 3 or 4 days, it may look worse. The penis often starts to look like it's getting better after about 7 to 10 days.  This care sheet gives you a general idea about how long it will take for your child to recover. But each child recovers at a different pace. Follow the steps below to help your child get better as quickly as possible.  How can you care for your child at home?  Activity    Let your baby rest as much as possible. Sleeping will help with recovery.     You can give your baby a sponge bath the day after surgery. Ask your doctor when it is okay to give your baby a bath.   Medicines    Your doctor will tell you if and when your child can restart any medicines. The doctor will also give you instructions about your child taking any new medicines.     Your doctor may recommend giving your baby acetaminophen (Tylenol) to help with pain after the procedure. Be safe with medicines. Give your child medicines exactly as prescribed. Call your doctor if you think your child is having a problem with a medicine.     Do not give your child two or more pain medicines at the same time unless the doctor told you to. Many pain medicines have acetaminophen, which is Tylenol. Too much acetaminophen (Tylenol) can be harmful.   Circumcision care    Always wash your hands before and after touching the circumcision area.     Gently wash your baby's penis with plain, warm water after each diaper change, and pat it dry. Do not use soap. Don't use hydrogen  "peroxide or alcohol. They can slow healing.     Do not try to remove the film that forms on the penis. The film will go away on its own.     Put plenty of petroleum jelly (such as Vaseline) on the circumcision area during each diaper change. This will prevent your baby's penis from sticking to the diaper while it heals.     Fasten your baby's diapers loosely so that there is less pressure on the penis while it heals.   Follow-up care is a key part of your child's treatment and safety. Be sure to make and go to all appointments, and call your doctor if your child is having problems. It's also a good idea to know your child's test results and keep a list of the medicines your child takes.  When should you call for help?   Call your doctor now or seek immediate medical care if:    Your baby has a fever over 100.4 F.     Your baby is extremely fussy or irritable, has a high-pitched cry, or refuses to eat.     Your baby does not have a wet diaper within 12 hours after the circumcision.     You find a spot of bleeding larger than a 2-inch [place] from the incision.     Your baby has signs of infection. Signs may include severe swelling; redness; a red streak on the shaft of the penis; or a thick, yellow discharge.   Watch closely for changes in your child's health, and be sure to contact your doctor if:    A Plastibell device was used for the circumcision and the ring has not fallen off after 10 to 12 days.   Where can you learn more?  Go to https://www.SmartVault.net/patiented  Enter S255 in the search box to learn more about \"Circumcision in Infants: What to Expect at Home.\"  Current as of: October 24, 2024  Content Version: 14.5    1634-8725 [employer].   Care instructions adapted under license by your healthcare professional. If you have questions about a medical condition or this instruction, always ask your healthcare professional. [employer] disclaims any warranty or liability for your use of " this information.

## 2025-07-17 NOTE — PROGRESS NOTES
34w0d  Feels well overall. Here with her daughter today.  Fetal Movement: present   Denies loss of fluid/vb & s/sx of preeclampsia.     Heartburn: Managing Pepcid BID  Pap due: Offer pp.   TW lbs    Contractions: none  Have car seat: Yes  Discussed GBS screen at next visit  Fetal kick counts/movements reviewed  Reviewed signs and symptoms PTL and s/sx of preeclampsia; denies any S&S and aware of what to report  Completed CBE? No  Birth preferences: Water birth  Return to clinic 2 weeks    Maryam Hernandez, ONEYDA, APRN, CNM

## 2025-07-31 ENCOUNTER — PRENATAL OFFICE VISIT (OUTPATIENT)
Dept: MIDWIFE SERVICES | Facility: CLINIC | Age: 30
End: 2025-07-31
Payer: COMMERCIAL

## 2025-07-31 VITALS
BODY MASS INDEX: 31.43 KG/M2 | HEIGHT: 63 IN | DIASTOLIC BLOOD PRESSURE: 52 MMHG | SYSTOLIC BLOOD PRESSURE: 100 MMHG | WEIGHT: 177.4 LBS

## 2025-07-31 DIAGNOSIS — Z34.83 SUPERVISION OF NORMAL INTRAUTERINE PREGNANCY IN MULTIGRAVIDA IN THIRD TRIMESTER: Primary | ICD-10-CM

## 2025-07-31 DIAGNOSIS — Z36.85 ANTENATAL SCREENING FOR STREPTOCOCCUS B: ICD-10-CM

## 2025-07-31 DIAGNOSIS — Z13.0 SCREENING, ANEMIA, DEFICIENCY, IRON: ICD-10-CM

## 2025-07-31 LAB
ERYTHROCYTE [DISTWIDTH] IN BLOOD BY AUTOMATED COUNT: 13.1 % (ref 10–15)
HCT VFR BLD AUTO: 37.9 % (ref 35–47)
HGB BLD-MCNC: 12.4 G/DL (ref 11.7–15.7)
MCH RBC QN AUTO: 29.6 PG (ref 26.5–33)
MCHC RBC AUTO-ENTMCNC: 32.7 G/DL (ref 31.5–36.5)
MCV RBC AUTO: 91 FL (ref 78–100)
PLATELET # BLD AUTO: 323 10E3/UL (ref 150–450)
RBC # BLD AUTO: 4.19 10E6/UL (ref 3.8–5.2)
WBC # BLD AUTO: 14.9 10E3/UL (ref 4–11)

## 2025-07-31 NOTE — PATIENT INSTRUCTIONS
"Labor Instructions for Midwife Patients    When to call:  Both during and after office hours call  [phone]. There is a triage RN to take your calls and answer your questions 24 hours a day.  If they cannot answer your question they will page the midwife on call for you.    When to call:  Call anytime you have important concerns about you or your baby.     Call if:  You are having contractions at regular intervals about 5-6 minutes apart lasting 60 seconds and becoming increasingly more intense   You have an uncontrollable gush of fluid from your vagina or feel a pop and gush like your water has broken  You have HEAVY bleeding, like heavy period, blood running down your legs, or  soaking a pad.   Some bleeding after a pelvic exam, after intercourse, or in labor when your cervix is dilating is normal and is referred to as \"bloody show\"  You have severe, continuous back or abdominal pain  You feel it is time to go to the hospital  If this is your first labor, call when contractions are very intense and have been about every 3-4 minutes for about an hour  If it is your second labor or more, call when contractions are strong and about every 3-5 minutes or sooner depending on your level of discomfort.     Keep in mind we are always here for you! If you have questions, concerns please don't hesitate to call us.     What to eat/drink in labor: Drink plenty of fluid (water most importantly, juice, soda or tea without caffeine). Eat rice, pasta, soup, cereal, bread/toast, and fruit. Avoid dairy and greasy food as they are difficult to digest and you may experience some nausea during labor.    Comfort measures:  Baths and showers (ok even with ruptured membranes, it may temporarily slow contractions if you are still in the early stage of labor)  Warm/hot packs for back pain or discomfort  Back, belly, or thigh massages  Standing, rocking, walking, leaning over bed or tables, side-lying and sleeping    Miscellaneous: "   Contractions are timed from the beginning of one to the beginning of the next  Try hard to sleep during the early stage of labor when you are not that uncomfortable. Timing of contractions at this point is not important  Even if you cannot sleep, resting in bed or on the couch can help you maintain your energy for labor  When you arrive at the hospital the nurse will check your baby's heartbeat, check your cervix, and will call us. The midwife on call will come in and be with you when you are in active labor  From 7am to 11pm, you can park in the East ramp and enter the hospital through Door [address]  After hours you need to enter the hospital through the emergency room    Weeks 34 to 36 of Your Pregnancy: Care Instructions  Your belly has grown quite large. It's almost time to give birth! Your baby's lungs are almost ready to breathe air. The skull bones are firm enough to protect your baby's head. But they're soft enough to move down through the birth canal.    You might be wondering what to expect during labor. Because each birth is different, there's no way to know exactly what childbirth will be like for you. Talk to your doctor or midwife about any concerns you have.   You'll probably have a test for group B streptococcus (GBS). GBS is bacteria that can live in the vagina and rectum. GBS can make your baby sick after birth. If you test positive, you'll get antibiotics during labor.     Choose what type of pain relief you would like during labor.  You can choose from a few types, including medicine and non-medicine options. You may want to use several types of pain relief.     Know how medicines can help with pain during labor.  Some medicines lower anxiety and help with some of the pain. Others make your lower body numb so that you will feel less pain.     Tell your doctor about your pain medicine choice.  Do this before you start labor or very early in your labor. You may be able to change your mind during labor.  "    Learn about the stages of labor.    The first stage includes the early (latent) and active phases of labor. Contractions start in early labor. During active labor, contractions get stronger, last longer, and happen more often. And the cervix opens more rapidly.  The second stage starts when you're ready to push. During this stage, your baby is born.  During the third stage, you'll usually have a few more contractions to push out the placenta.   Follow-up care is a key part of your treatment and safety. Be sure to make and go to all appointments, and call your doctor if you are having problems. It's also a good idea to know your test results and keep a list of the medicines you take.  Where can you learn more?  Go to https://www.Drill [email or web address].CiraNova/patiented  Enter B912 in the search box to learn more about \"Weeks 34 to 36 of Your Pregnancy: Care Instructions.\"  Current as of: 2024  Content Version: 14.5    [id number] [employer].   Care instructions adapted under license by your healthcare professional. If you have questions about a medical condition or this instruction, always ask your healthcare professional. [employer] disclaims any warranty or liability for your use of this information.    Group B Strep During Pregnancy: Care Instructions  Overview     Group B strep infection is caused by a type of bacteria. It's a different kind of bacteria than the kind that causes strep throat.  You may have this kind of bacteria in your body. Sometimes it may cause an infection, but most of the time it doesn't make you sick or cause symptoms. But if you pass the bacteria to your baby during the birth, it can cause serious health problems for your baby.  If you have this bacteria in your body, you will get antibiotics when you are in labor. Antibiotics help prevent problems for a  baby.  After birth, doctors will watch and may test your baby. If your baby tests positive for Group B strep, " "your baby will get antibiotics.  If you plan to breastfeed your baby, don't worry. It will be safe to breastfeed.  Follow-up care is a key part of your treatment and safety. Be sure to make and go to all appointments, and call your doctor if you are having problems. It's also a good idea to know your test results and keep a list of the medicines you take.  How can you care for yourself at home?  If your doctor has prescribed antibiotics, take them as directed. Do not stop taking them just because you feel better. You need to take the full course of antibiotics.  Tell your doctor if you are allergic to any antibiotic.  If you go into labor, or your water breaks, go to the hospital. Your doctor will give you antibiotics to help protect your baby from infection.  Tell the doctors and nurses if you have an allergy to penicillin.  Tell the doctors and nurses at the hospital that you tested positive for group B strep.  When should you call for help?   Call your doctor now or seek immediate medical care if:    You have symptoms of a urinary tract infection. These may include:  Pain or burning when you urinate.  A frequent need to urinate without being able to pass much urine.  Pain in the flank, which is just below the rib cage and above the waist on either side of the back.  Blood in your urine.  A fever.     You think you are in labor or your water has broken.     You have pain in your belly or pelvis.   Watch closely for changes in your health, and be sure to contact your doctor if you have any problems.  Where can you learn more?  Go to https://www.[employer].net/patiented  Enter M001 in the search box to learn more about \"Group B Strep During Pregnancy: Care Instructions.\"  Current as of: April 30, 2024  Content Version: 14.5    6414-8895 [employer].   Care instructions adapted under license by your healthcare professional. If you have questions about a medical condition or this instruction, always ask " your healthcare professional. [employer] disclaims any warranty or liability for your use of this information.    Week 37 of Your Pregnancy: Care Instructions    Most babies are born between 37 and 40 weeks.   This is a good time to pack a bag to take with you to the birth. Then it will be ready to go when you are.   7 tips for week 37 of pregnancy   Learn about breastfeeding. For example, find out about ways to hold your baby to make breastfeeding easier. And think about learning how to pump and store milk.    Know that crying is normal. It's common for babies to cry 1 to 3 hours a day. Some cry more, and some cry less.    Learn why babies cry. They may be hungry; have gas; need a diaper change; or feel cold, warm, tired, lonely, or tense. Sometimes they cry for unknown reasons.    Think about what will help you stay calm when your baby cries. Taking slow, deep breaths can help. So can taking a break. It's okay to put your baby somewhere safe (like their crib) and walk away for a few minutes.    Learn about safe sleep for your baby. Always put your baby to sleep on their back. Place them alone in a crib or bassinet with a firm, flat surface. Keep soft items like stuffed animals out of the crib.    Learn what to expect with  poop. Your baby will have their own bowel patterns. Some babies have several bowel movements a day. Some have fewer.    Know that  babies will often have loose, yellow bowel movements. Formula-fed babies have more formed stools. If your baby's poop looks like pellets, your baby is constipated.   Follow-up care is a key part of your treatment and safety. Be sure to make and go to all appointments, and call your doctor if you are having problems. It's also a good idea to know your test results and keep a list of the medicines you take.  When should you call for help?  Call 911 anytime you think you may need emergency care. For example, call if:  You have severe vaginal  bleeding. You have soaked through one or more pads in an hour, and the bleeding is not slowing down.  You have sudden, severe pain in your belly that does not go away.  You have chest pain, are short of breath, or cough up blood.  You passed out (lost consciousness).  You have a seizure.  You see or feel the umbilical cord.  You think you are about to deliver your baby and can't make it safely to the hospital or birthing center.  Call your doctor now or seek immediate medical care if:  You have vaginal bleeding.  You have belly pain.  You have a fever.  You are dizzy or lightheaded, or you feel like you may faint.  You have signs of a blood clot in your leg (called a deep vein thrombosis), such as:  Pain in the calf, back of the knee, thigh, or groin.  Swelling in your leg or groin.  A color change on the leg or groin. The skin may be reddish or purplish.  You have symptoms of preeclampsia, such as:  Sudden swelling of your face, hands, or feet.  New vision problems (such as dimness, blurring, or seeing spots).  A severe headache that will not go away.  You have a sudden release or slow trickle of fluid from your vagina. This may mean your water has broken.  You notice that your baby has stopped moving or is moving less than normal.  You have signs of heart failure, such as:  New or increased shortness of breath.  New or worse swelling in your legs, ankles, or feet.  Sudden weight gain, such as more than 2 to 3 pounds in a day or 5 pounds in a week.  Feeling so tired or weak that you cannot do your usual activities.  You have symptoms of a urinary tract infection. These may include:  Pain or burning when you urinate.  A frequent need to urinate without being able to pass much urine.  Pain in your low back (below the rib cage and above the waist).  Blood in your urine.  You have signs of active labor. During active labor, contractions:  Happen more often and at regular intervals (about every 3 to 5 minutes).  Last  "longer (about 60 seconds or more).  Get stronger and are hard to talk through.  Watch closely for changes in your health, and be sure to contact your doctor if:  You have vaginal discharge that smells bad.  You feel sad, anxious, or hopeless for more than a few days.  You have skin changes, such as a rash, itching, or a yellow color to your skin.  You have other concerns about your pregnancy.  Where can you learn more?  Go to https://www.[email or web address].net/patiented  Enter N257 in the search box to learn more about \"Week 37 of Your Pregnancy: Care Instructions.\"  Current as of: April 30, 2024  Content Version: 14.5    [id number] [employer].   Care instructions adapted under license by your healthcare professional. If you have questions about a medical condition or this instruction, always ask your healthcare professional. [employer] disclaims any warranty or liability for your use of this information.    "

## 2025-07-31 NOTE — PROGRESS NOTES
36w0d  Feels well overall. Here with Juan & Renae today.  Fetal Movement: present   Denies loss of fluid/vb & s/sx of preeclampsia.   Contractions: infrequent mild    She had a lot of soreness in her last shift in the ICU. Her next scheduled shift is on 25. She will get restriction paperwork before then and have us fill it out at her next appointment.     Heartburn: Managing with Pepcid.  TW lbs    GBS swab done today  Confirmed vertex via bedside US    Labor preferences/birth plan reviewed: Waterbirth   CBC drawn today.     Labor instructions given  Reviewed fetal kick/movement counts  Warning signs and signs and symptoms preeclampsia reviewed    Return to clinic 1 week    Maryam Hernandez, DNP, APRN, CNM

## 2025-08-05 ENCOUNTER — PRENATAL OFFICE VISIT (OUTPATIENT)
Dept: MIDWIFE SERVICES | Facility: CLINIC | Age: 30
End: 2025-08-05
Payer: COMMERCIAL

## 2025-08-05 VITALS — WEIGHT: 179 LBS | BODY MASS INDEX: 31.71 KG/M2

## 2025-08-05 DIAGNOSIS — Z34.83 SUPERVISION OF NORMAL INTRAUTERINE PREGNANCY IN MULTIGRAVIDA IN THIRD TRIMESTER: Primary | ICD-10-CM

## 2025-08-05 PROCEDURE — 0502F SUBSEQUENT PRENATAL CARE: CPT | Performed by: NURSE PRACTITIONER

## 2025-08-05 PROCEDURE — 99207 PR PRENATAL VISIT: CPT | Performed by: NURSE PRACTITIONER

## 2025-08-14 ENCOUNTER — TELEPHONE (OUTPATIENT)
Dept: OBGYN | Facility: CLINIC | Age: 30
End: 2025-08-14
Payer: COMMERCIAL

## 2025-08-21 ENCOUNTER — PRENATAL OFFICE VISIT (OUTPATIENT)
Dept: MIDWIFE SERVICES | Facility: CLINIC | Age: 30
End: 2025-08-21
Payer: COMMERCIAL

## 2025-08-21 VITALS
SYSTOLIC BLOOD PRESSURE: 110 MMHG | BODY MASS INDEX: 31.36 KG/M2 | HEIGHT: 63 IN | DIASTOLIC BLOOD PRESSURE: 56 MMHG | WEIGHT: 177 LBS

## 2025-08-21 DIAGNOSIS — Z34.81 SUPERVISION OF NORMAL INTRAUTERINE PREGNANCY IN MULTIGRAVIDA IN FIRST TRIMESTER: Primary | ICD-10-CM

## 2025-08-26 ENCOUNTER — PRENATAL OFFICE VISIT (OUTPATIENT)
Dept: MIDWIFE SERVICES | Facility: CLINIC | Age: 30
End: 2025-08-26
Payer: COMMERCIAL

## 2025-08-26 VITALS
SYSTOLIC BLOOD PRESSURE: 106 MMHG | WEIGHT: 178 LBS | OXYGEN SATURATION: 99 % | DIASTOLIC BLOOD PRESSURE: 60 MMHG | HEART RATE: 86 BPM | BODY MASS INDEX: 31.53 KG/M2

## 2025-08-26 DIAGNOSIS — Z34.81 SUPERVISION OF NORMAL INTRAUTERINE PREGNANCY IN MULTIGRAVIDA IN FIRST TRIMESTER: Primary | ICD-10-CM

## 2025-08-26 DIAGNOSIS — R12 HEARTBURN: ICD-10-CM

## 2025-08-26 DIAGNOSIS — I47.10 PSVT (PAROXYSMAL SUPRAVENTRICULAR TACHYCARDIA): ICD-10-CM

## 2025-08-26 DIAGNOSIS — Z3A.39 39 WEEKS GESTATION OF PREGNANCY: ICD-10-CM

## 2025-08-26 PROCEDURE — 3078F DIAST BP <80 MM HG: CPT

## 2025-08-26 PROCEDURE — 0502F SUBSEQUENT PRENATAL CARE: CPT

## 2025-08-26 PROCEDURE — 3074F SYST BP LT 130 MM HG: CPT

## 2025-08-26 PROCEDURE — 99207 PR PRENATAL VISIT: CPT

## 2025-08-29 ENCOUNTER — ANESTHESIA (OUTPATIENT)
Dept: OBGYN | Facility: CLINIC | Age: 30
End: 2025-08-29
Payer: COMMERCIAL

## 2025-08-29 ENCOUNTER — HOSPITAL ENCOUNTER (INPATIENT)
Facility: CLINIC | Age: 30
LOS: 1 days | Discharge: HOME HEALTH | End: 2025-08-30
Attending: ADVANCED PRACTICE MIDWIFE | Admitting: ADVANCED PRACTICE MIDWIFE
Payer: COMMERCIAL

## 2025-08-29 ENCOUNTER — ANESTHESIA EVENT (OUTPATIENT)
Dept: OBGYN | Facility: CLINIC | Age: 30
End: 2025-08-29
Payer: COMMERCIAL

## 2025-08-29 DIAGNOSIS — Z34.81 SUPERVISION OF NORMAL INTRAUTERINE PREGNANCY IN MULTIGRAVIDA IN FIRST TRIMESTER: Primary | ICD-10-CM

## 2025-08-29 LAB
ABO + RH BLD: NORMAL
BLD GP AB SCN SERPL QL: NEGATIVE
ERYTHROCYTE [DISTWIDTH] IN BLOOD BY AUTOMATED COUNT: 13.2 % (ref 10–15)
HCT VFR BLD AUTO: 38 % (ref 35–47)
HGB BLD-MCNC: 12.7 G/DL (ref 11.7–15.7)
MCH RBC QN AUTO: 29.1 PG (ref 26.5–33)
MCHC RBC AUTO-ENTMCNC: 33.4 G/DL (ref 31.5–36.5)
MCV RBC AUTO: 87 FL (ref 78–100)
PLATELET # BLD AUTO: 323 10E3/UL (ref 150–450)
RBC # BLD AUTO: 4.37 10E6/UL (ref 3.8–5.2)
SPECIMEN EXP DATE BLD: NORMAL
T PALLIDUM AB SER QL: NONREACTIVE
WBC # BLD AUTO: 18.82 10E3/UL (ref 4–11)

## 2025-08-29 PROCEDURE — 120N000013 HC R&B IMCU

## 2025-08-29 PROCEDURE — 86900 BLOOD TYPING SEROLOGIC ABO: CPT | Performed by: ADVANCED PRACTICE MIDWIFE

## 2025-08-29 PROCEDURE — G0463 HOSPITAL OUTPT CLINIC VISIT: HCPCS

## 2025-08-29 PROCEDURE — 86780 TREPONEMA PALLIDUM: CPT | Performed by: ADVANCED PRACTICE MIDWIFE

## 2025-08-29 PROCEDURE — 250N000011 HC RX IP 250 OP 636: Performed by: ANESTHESIOLOGY

## 2025-08-29 PROCEDURE — 36415 COLL VENOUS BLD VENIPUNCTURE: CPT | Performed by: ADVANCED PRACTICE MIDWIFE

## 2025-08-29 PROCEDURE — 59400 OBSTETRICAL CARE: CPT | Performed by: ADVANCED PRACTICE MIDWIFE

## 2025-08-29 PROCEDURE — 10907ZC DRAINAGE OF AMNIOTIC FLUID, THERAPEUTIC FROM PRODUCTS OF CONCEPTION, VIA NATURAL OR ARTIFICIAL OPENING: ICD-10-PCS | Performed by: ADVANCED PRACTICE MIDWIFE

## 2025-08-29 PROCEDURE — 999N000016 HC STATISTIC ATTENDANCE AT DELIVERY

## 2025-08-29 PROCEDURE — 722N000001 HC LABOR CARE VAGINAL DELIVERY SINGLE

## 2025-08-29 PROCEDURE — 370N000003 HC ANESTHESIA WARD SERVICE: Performed by: ANESTHESIOLOGY

## 2025-08-29 PROCEDURE — 250N000013 HC RX MED GY IP 250 OP 250 PS 637: Performed by: ADVANCED PRACTICE MIDWIFE

## 2025-08-29 PROCEDURE — 250N000009 HC RX 250: Performed by: ADVANCED PRACTICE MIDWIFE

## 2025-08-29 PROCEDURE — 85027 COMPLETE CBC AUTOMATED: CPT | Performed by: ADVANCED PRACTICE MIDWIFE

## 2025-08-29 PROCEDURE — 00HU33Z INSERTION OF INFUSION DEVICE INTO SPINAL CANAL, PERCUTANEOUS APPROACH: ICD-10-PCS | Performed by: ANESTHESIOLOGY

## 2025-08-29 PROCEDURE — 3E0R3BZ INTRODUCTION OF ANESTHETIC AGENT INTO SPINAL CANAL, PERCUTANEOUS APPROACH: ICD-10-PCS | Performed by: ANESTHESIOLOGY

## 2025-08-29 RX ORDER — ROPIVACAINE HYDROCHLORIDE 2 MG/ML
INJECTION, SOLUTION EPIDURAL; INFILTRATION; PERINEURAL
Status: COMPLETED | OUTPATIENT
Start: 2025-08-29 | End: 2025-08-29

## 2025-08-29 RX ORDER — OXYTOCIN 10 [USP'U]/ML
10 INJECTION, SOLUTION INTRAMUSCULAR; INTRAVENOUS
Status: DISCONTINUED | OUTPATIENT
Start: 2025-08-29 | End: 2025-08-30 | Stop reason: HOSPADM

## 2025-08-29 RX ORDER — FENTANYL CITRATE 50 UG/ML
100 INJECTION, SOLUTION INTRAMUSCULAR; INTRAVENOUS
Refills: 0 | Status: DISCONTINUED | OUTPATIENT
Start: 2025-08-29 | End: 2025-08-29 | Stop reason: HOSPADM

## 2025-08-29 RX ORDER — LOPERAMIDE HYDROCHLORIDE 2 MG/1
4 CAPSULE ORAL
Status: DISCONTINUED | OUTPATIENT
Start: 2025-08-29 | End: 2025-08-30 | Stop reason: HOSPADM

## 2025-08-29 RX ORDER — METOCLOPRAMIDE HYDROCHLORIDE 5 MG/ML
10 INJECTION INTRAMUSCULAR; INTRAVENOUS EVERY 6 HOURS PRN
Status: DISCONTINUED | OUTPATIENT
Start: 2025-08-29 | End: 2025-08-29 | Stop reason: HOSPADM

## 2025-08-29 RX ORDER — OXYTOCIN 10 [USP'U]/ML
10 INJECTION, SOLUTION INTRAMUSCULAR; INTRAVENOUS
Status: DISCONTINUED | OUTPATIENT
Start: 2025-08-29 | End: 2025-08-29 | Stop reason: HOSPADM

## 2025-08-29 RX ORDER — OXYTOCIN/0.9 % SODIUM CHLORIDE 30/500 ML
340 PLASTIC BAG, INJECTION (ML) INTRAVENOUS CONTINUOUS PRN
Status: DISCONTINUED | OUTPATIENT
Start: 2025-08-29 | End: 2025-08-30 | Stop reason: HOSPADM

## 2025-08-29 RX ORDER — TERBUTALINE SULFATE 1 MG/ML
0.25 INJECTION SUBCUTANEOUS
Status: DISCONTINUED | OUTPATIENT
Start: 2025-08-29 | End: 2025-08-29 | Stop reason: HOSPADM

## 2025-08-29 RX ORDER — EPHEDRINE SULFATE 50 MG/ML
5 INJECTION, SOLUTION INTRAMUSCULAR; INTRAVENOUS; SUBCUTANEOUS
Status: DISCONTINUED | OUTPATIENT
Start: 2025-08-29 | End: 2025-08-29 | Stop reason: HOSPADM

## 2025-08-29 RX ORDER — METHYLERGONOVINE MALEATE 0.2 MG/ML
200 INJECTION INTRAVENOUS
Status: DISCONTINUED | OUTPATIENT
Start: 2025-08-29 | End: 2025-08-29 | Stop reason: HOSPADM

## 2025-08-29 RX ORDER — KETOROLAC TROMETHAMINE 15 MG/ML
15 INJECTION, SOLUTION INTRAMUSCULAR; INTRAVENOUS
Status: DISCONTINUED | OUTPATIENT
Start: 2025-08-29 | End: 2025-08-29 | Stop reason: ALTCHOICE

## 2025-08-29 RX ORDER — OXYTOCIN/0.9 % SODIUM CHLORIDE 30/500 ML
340 PLASTIC BAG, INJECTION (ML) INTRAVENOUS CONTINUOUS PRN
Status: DISCONTINUED | OUTPATIENT
Start: 2025-08-29 | End: 2025-08-29 | Stop reason: HOSPADM

## 2025-08-29 RX ORDER — CARBOPROST TROMETHAMINE 250 UG/ML
250 INJECTION, SOLUTION INTRAMUSCULAR
Status: DISCONTINUED | OUTPATIENT
Start: 2025-08-29 | End: 2025-08-30 | Stop reason: HOSPADM

## 2025-08-29 RX ORDER — PROCHLORPERAZINE MALEATE 5 MG/1
10 TABLET ORAL EVERY 6 HOURS PRN
Status: DISCONTINUED | OUTPATIENT
Start: 2025-08-29 | End: 2025-08-29 | Stop reason: HOSPADM

## 2025-08-29 RX ORDER — METOCLOPRAMIDE 10 MG/1
10 TABLET ORAL EVERY 6 HOURS PRN
Status: DISCONTINUED | OUTPATIENT
Start: 2025-08-29 | End: 2025-08-29 | Stop reason: HOSPADM

## 2025-08-29 RX ORDER — CARBOPROST TROMETHAMINE 250 UG/ML
250 INJECTION, SOLUTION INTRAMUSCULAR
Status: DISCONTINUED | OUTPATIENT
Start: 2025-08-29 | End: 2025-08-29 | Stop reason: HOSPADM

## 2025-08-29 RX ORDER — TRANEXAMIC ACID 10 MG/ML
1 INJECTION, SOLUTION INTRAVENOUS EVERY 30 MIN PRN
Status: DISCONTINUED | OUTPATIENT
Start: 2025-08-29 | End: 2025-08-30 | Stop reason: HOSPADM

## 2025-08-29 RX ORDER — POLYETHYLENE GLYCOL 3350 17 G/17G
17 POWDER, FOR SOLUTION ORAL DAILY PRN
Status: DISCONTINUED | OUTPATIENT
Start: 2025-08-29 | End: 2025-08-30 | Stop reason: HOSPADM

## 2025-08-29 RX ORDER — IBUPROFEN 400 MG/1
800 TABLET, FILM COATED ORAL
Status: DISCONTINUED | OUTPATIENT
Start: 2025-08-29 | End: 2025-08-29 | Stop reason: ALTCHOICE

## 2025-08-29 RX ORDER — AMOXICILLIN 250 MG
2 CAPSULE ORAL
Status: DISCONTINUED | OUTPATIENT
Start: 2025-08-29 | End: 2025-08-30 | Stop reason: HOSPADM

## 2025-08-29 RX ORDER — MISOPROSTOL 200 UG/1
800 TABLET ORAL
Status: DISCONTINUED | OUTPATIENT
Start: 2025-08-29 | End: 2025-08-30 | Stop reason: HOSPADM

## 2025-08-29 RX ORDER — MISOPROSTOL 200 UG/1
400 TABLET ORAL
Status: DISCONTINUED | OUTPATIENT
Start: 2025-08-29 | End: 2025-08-29 | Stop reason: HOSPADM

## 2025-08-29 RX ORDER — METHYLERGONOVINE MALEATE 0.2 MG/ML
200 INJECTION INTRAVENOUS
Status: DISCONTINUED | OUTPATIENT
Start: 2025-08-29 | End: 2025-08-30 | Stop reason: HOSPADM

## 2025-08-29 RX ORDER — TRANEXAMIC ACID 10 MG/ML
1 INJECTION, SOLUTION INTRAVENOUS EVERY 30 MIN PRN
Status: DISCONTINUED | OUTPATIENT
Start: 2025-08-29 | End: 2025-08-29 | Stop reason: HOSPADM

## 2025-08-29 RX ORDER — BISACODYL 10 MG
10 SUPPOSITORY, RECTAL RECTAL DAILY PRN
Status: DISCONTINUED | OUTPATIENT
Start: 2025-08-29 | End: 2025-08-30 | Stop reason: HOSPADM

## 2025-08-29 RX ORDER — CITRIC ACID/SODIUM CITRATE 334-500MG
30 SOLUTION, ORAL ORAL
Status: DISCONTINUED | OUTPATIENT
Start: 2025-08-29 | End: 2025-08-29 | Stop reason: HOSPADM

## 2025-08-29 RX ORDER — ONDANSETRON 4 MG/1
4 TABLET, ORALLY DISINTEGRATING ORAL EVERY 6 HOURS PRN
Status: DISCONTINUED | OUTPATIENT
Start: 2025-08-29 | End: 2025-08-29 | Stop reason: HOSPADM

## 2025-08-29 RX ORDER — MISOPROSTOL 200 UG/1
400 TABLET ORAL
Status: DISCONTINUED | OUTPATIENT
Start: 2025-08-29 | End: 2025-08-30 | Stop reason: HOSPADM

## 2025-08-29 RX ORDER — OXYTOCIN/0.9 % SODIUM CHLORIDE 30/500 ML
100-340 PLASTIC BAG, INJECTION (ML) INTRAVENOUS CONTINUOUS PRN
Status: DISCONTINUED | OUTPATIENT
Start: 2025-08-29 | End: 2025-08-30 | Stop reason: HOSPADM

## 2025-08-29 RX ORDER — ACETAMINOPHEN 325 MG/1
650 TABLET ORAL EVERY 4 HOURS PRN
Status: DISCONTINUED | OUTPATIENT
Start: 2025-08-29 | End: 2025-08-30 | Stop reason: HOSPADM

## 2025-08-29 RX ORDER — NALBUPHINE HYDROCHLORIDE 10 MG/ML
2.5-5 INJECTION INTRAMUSCULAR; INTRAVENOUS; SUBCUTANEOUS EVERY 6 HOURS PRN
Status: DISCONTINUED | OUTPATIENT
Start: 2025-08-29 | End: 2025-08-30 | Stop reason: HOSPADM

## 2025-08-29 RX ORDER — LOPERAMIDE HYDROCHLORIDE 2 MG/1
2 CAPSULE ORAL
Status: DISCONTINUED | OUTPATIENT
Start: 2025-08-29 | End: 2025-08-29 | Stop reason: HOSPADM

## 2025-08-29 RX ORDER — ROPIVACAINE HYDROCHLORIDE 2 MG/ML
10 INJECTION, SOLUTION EPIDURAL; INFILTRATION; PERINEURAL ONCE
Status: DISCONTINUED | OUTPATIENT
Start: 2025-08-29 | End: 2025-08-29 | Stop reason: HOSPADM

## 2025-08-29 RX ORDER — ACETAMINOPHEN 325 MG/1
650 TABLET ORAL EVERY 4 HOURS PRN
Status: DISCONTINUED | OUTPATIENT
Start: 2025-08-29 | End: 2025-08-29 | Stop reason: HOSPADM

## 2025-08-29 RX ORDER — LOPERAMIDE HYDROCHLORIDE 2 MG/1
4 CAPSULE ORAL
Status: DISCONTINUED | OUTPATIENT
Start: 2025-08-29 | End: 2025-08-29 | Stop reason: HOSPADM

## 2025-08-29 RX ORDER — LOPERAMIDE HYDROCHLORIDE 2 MG/1
2 CAPSULE ORAL
Status: DISCONTINUED | OUTPATIENT
Start: 2025-08-29 | End: 2025-08-30 | Stop reason: HOSPADM

## 2025-08-29 RX ORDER — IBUPROFEN 400 MG/1
800 TABLET, FILM COATED ORAL EVERY 6 HOURS PRN
Status: DISCONTINUED | OUTPATIENT
Start: 2025-08-29 | End: 2025-08-30 | Stop reason: HOSPADM

## 2025-08-29 RX ORDER — MISOPROSTOL 200 UG/1
800 TABLET ORAL
Status: DISCONTINUED | OUTPATIENT
Start: 2025-08-29 | End: 2025-08-29 | Stop reason: HOSPADM

## 2025-08-29 RX ORDER — HYDROCORTISONE 25 MG/G
CREAM TOPICAL 3 TIMES DAILY PRN
Status: DISCONTINUED | OUTPATIENT
Start: 2025-08-29 | End: 2025-08-30 | Stop reason: HOSPADM

## 2025-08-29 RX ORDER — ONDANSETRON 2 MG/ML
4 INJECTION INTRAMUSCULAR; INTRAVENOUS EVERY 6 HOURS PRN
Status: DISCONTINUED | OUTPATIENT
Start: 2025-08-29 | End: 2025-08-29 | Stop reason: HOSPADM

## 2025-08-29 RX ORDER — SODIUM CHLORIDE, SODIUM LACTATE, POTASSIUM CHLORIDE, CALCIUM CHLORIDE 600; 310; 30; 20 MG/100ML; MG/100ML; MG/100ML; MG/100ML
INJECTION, SOLUTION INTRAVENOUS CONTINUOUS
Status: DISCONTINUED | OUTPATIENT
Start: 2025-08-29 | End: 2025-08-29 | Stop reason: HOSPADM

## 2025-08-29 RX ADMIN — IBUPROFEN 800 MG: 400 TABLET ORAL at 19:30

## 2025-08-29 RX ADMIN — ROPIVACAINE HYDROCHLORIDE 10 ML: 2 INJECTION, SOLUTION EPIDURAL; INFILTRATION at 09:30

## 2025-08-29 RX ADMIN — ACETAMINOPHEN 650 MG: 325 TABLET ORAL at 19:30

## 2025-08-29 RX ADMIN — Medication: at 09:27

## 2025-08-29 RX ADMIN — ACETAMINOPHEN 650 MG: 325 TABLET ORAL at 12:57

## 2025-08-29 RX ADMIN — IBUPROFEN 800 MG: 400 TABLET ORAL at 12:58

## 2025-08-29 RX ADMIN — Medication 340 ML/HR: at 11:03

## 2025-08-29 ASSESSMENT — ACTIVITIES OF DAILY LIVING (ADL)
ADLS_ACUITY_SCORE: 24
ADLS_ACUITY_SCORE: 23
ADLS_ACUITY_SCORE: 23
ADLS_ACUITY_SCORE: 24
ADLS_ACUITY_SCORE: 23
ADLS_ACUITY_SCORE: 24
ADLS_ACUITY_SCORE: 23
ADLS_ACUITY_SCORE: 23
ADLS_ACUITY_SCORE: 24
ADLS_ACUITY_SCORE: 23
ADLS_ACUITY_SCORE: 24
ADLS_ACUITY_SCORE: 23
ADLS_ACUITY_SCORE: 24

## 2025-08-30 VITALS
DIASTOLIC BLOOD PRESSURE: 70 MMHG | RESPIRATION RATE: 16 BRPM | WEIGHT: 171.7 LBS | HEART RATE: 79 BPM | SYSTOLIC BLOOD PRESSURE: 114 MMHG | OXYGEN SATURATION: 100 % | BODY MASS INDEX: 30.42 KG/M2 | TEMPERATURE: 98 F | HEIGHT: 63 IN

## 2025-08-30 LAB
HGB BLD-MCNC: 11.8 G/DL (ref 11.7–15.7)
MCV RBC AUTO: 88.9 FL (ref 78–100)

## 2025-08-30 PROCEDURE — 250N000013 HC RX MED GY IP 250 OP 250 PS 637: Performed by: ADVANCED PRACTICE MIDWIFE

## 2025-08-30 PROCEDURE — 85018 HEMOGLOBIN: CPT | Performed by: ADVANCED PRACTICE MIDWIFE

## 2025-08-30 PROCEDURE — 36415 COLL VENOUS BLD VENIPUNCTURE: CPT | Performed by: ADVANCED PRACTICE MIDWIFE

## 2025-08-30 RX ORDER — AMOXICILLIN 250 MG
2 CAPSULE ORAL
Qty: 60 TABLET | Refills: 0 | Status: SHIPPED | OUTPATIENT
Start: 2025-08-30

## 2025-08-30 RX ORDER — IBUPROFEN 800 MG/1
800 TABLET, FILM COATED ORAL EVERY 6 HOURS PRN
Qty: 60 TABLET | Refills: 0 | Status: SHIPPED | OUTPATIENT
Start: 2025-08-30

## 2025-08-30 RX ORDER — ACETAMINOPHEN 325 MG/1
325-650 TABLET ORAL EVERY 4 HOURS PRN
Qty: 60 TABLET | Refills: 0 | Status: SHIPPED | OUTPATIENT
Start: 2025-08-30

## 2025-08-30 RX ADMIN — ACETAMINOPHEN 650 MG: 325 TABLET ORAL at 13:38

## 2025-08-30 RX ADMIN — IBUPROFEN 800 MG: 400 TABLET ORAL at 13:38

## 2025-08-30 RX ADMIN — IBUPROFEN 800 MG: 400 TABLET ORAL at 01:26

## 2025-08-30 RX ADMIN — IBUPROFEN 800 MG: 400 TABLET ORAL at 07:55

## 2025-08-30 RX ADMIN — ACETAMINOPHEN 650 MG: 325 TABLET ORAL at 07:54

## 2025-08-30 RX ADMIN — METHYLCELLULOSE 1000 MG: 500 TABLET ORAL at 07:54

## 2025-08-30 RX ADMIN — ACETAMINOPHEN 650 MG: 325 TABLET ORAL at 01:26

## 2025-08-30 ASSESSMENT — ACTIVITIES OF DAILY LIVING (ADL)
ADLS_ACUITY_SCORE: 24

## (undated) DEVICE — PREP DURAPREP 26ML APL 8630

## (undated) DEVICE — LINEN ORTHO PACK 5446

## (undated) DEVICE — DRSG STERI STRIP 1/2X4" R1547

## (undated) DEVICE — GLOVE BIOGEL PI SZ 7.5 40875

## (undated) DEVICE — SOL WATER IRRIG 500ML BOTTLE 2F7113

## (undated) DEVICE — GLOVE PROTEXIS POWDER FREE SMT 7.0  2D72PT70X

## (undated) DEVICE — Device

## (undated) DEVICE — PACK HAND CUSTOM ASC

## (undated) DEVICE — ESU PENCIL SMOKE EVAC W/ROCKER SWITCH 0703-047-000

## (undated) DEVICE — CAST PADDING 4" STERILE 9044S

## (undated) DEVICE — DRAPE STERI U 1015

## (undated) DEVICE — STRAP STIRRUP W/SLIP 30187-030

## (undated) DEVICE — ESU GROUND PAD ADULT W/CORD E7507

## (undated) DEVICE — SUCTION MANIFOLD NEPTUNE 2 SYS 1 PORT 702-025-000

## (undated) DEVICE — SOL NACL 0.9% IRRIG 500ML BOTTLE 2F7123

## (undated) RX ORDER — EPINEPHRINE 1 MG/ML
INJECTION, SOLUTION INTRAMUSCULAR; SUBCUTANEOUS
Status: DISPENSED
Start: 2022-10-11

## (undated) RX ORDER — ONDANSETRON 4 MG/1
TABLET, ORALLY DISINTEGRATING ORAL
Status: DISPENSED
Start: 2022-10-11

## (undated) RX ORDER — GABAPENTIN 300 MG/1
CAPSULE ORAL
Status: DISPENSED
Start: 2022-10-11

## (undated) RX ORDER — BUPIVACAINE HYDROCHLORIDE 5 MG/ML
INJECTION, SOLUTION EPIDURAL; INTRACAUDAL
Status: DISPENSED
Start: 2022-10-11

## (undated) RX ORDER — ACETAMINOPHEN 325 MG/1
TABLET ORAL
Status: DISPENSED
Start: 2022-10-11

## (undated) RX ORDER — FENTANYL CITRATE 50 UG/ML
INJECTION, SOLUTION INTRAMUSCULAR; INTRAVENOUS
Status: DISPENSED
Start: 2022-10-11

## (undated) RX ORDER — LIDOCAINE HYDROCHLORIDE 20 MG/ML
INJECTION, SOLUTION EPIDURAL; INFILTRATION; INTRACAUDAL; PERINEURAL
Status: DISPENSED
Start: 2022-10-11

## (undated) RX ORDER — KETOROLAC TROMETHAMINE 30 MG/ML
INJECTION, SOLUTION INTRAMUSCULAR; INTRAVENOUS
Status: DISPENSED
Start: 2022-10-11

## (undated) RX ORDER — CEFAZOLIN SODIUM 2 G/50ML
SOLUTION INTRAVENOUS
Status: DISPENSED
Start: 2022-10-11

## (undated) RX ORDER — ONDANSETRON 2 MG/ML
INJECTION INTRAMUSCULAR; INTRAVENOUS
Status: DISPENSED
Start: 2022-10-11

## (undated) RX ORDER — BUPIVACAINE HYDROCHLORIDE 2.5 MG/ML
INJECTION, SOLUTION EPIDURAL; INFILTRATION; INTRACAUDAL
Status: DISPENSED
Start: 2022-10-11

## (undated) RX ORDER — PROPOFOL 10 MG/ML
INJECTION, EMULSION INTRAVENOUS
Status: DISPENSED
Start: 2022-10-11

## (undated) RX ORDER — LIDOCAINE HYDROCHLORIDE 10 MG/ML
INJECTION, SOLUTION EPIDURAL; INFILTRATION; INTRACAUDAL; PERINEURAL
Status: DISPENSED
Start: 2022-10-11

## (undated) RX ORDER — EPINEPHRINE NASAL SOLUTION 1 MG/ML
SOLUTION NASAL
Status: DISPENSED
Start: 2022-10-11

## (undated) RX ORDER — GLYCOPYRROLATE 0.2 MG/ML
INJECTION INTRAMUSCULAR; INTRAVENOUS
Status: DISPENSED
Start: 2022-10-11

## (undated) RX ORDER — DEXAMETHASONE SODIUM PHOSPHATE 4 MG/ML
INJECTION, SOLUTION INTRA-ARTICULAR; INTRALESIONAL; INTRAMUSCULAR; INTRAVENOUS; SOFT TISSUE
Status: DISPENSED
Start: 2022-10-11